# Patient Record
Sex: FEMALE | Race: WHITE | NOT HISPANIC OR LATINO | Employment: FULL TIME
[De-identification: names, ages, dates, MRNs, and addresses within clinical notes are randomized per-mention and may not be internally consistent; named-entity substitution may affect disease eponyms.]

---

## 2017-08-05 ENCOUNTER — HEALTH MAINTENANCE LETTER (OUTPATIENT)
Age: 47
End: 2017-08-05

## 2018-07-01 ENCOUNTER — HEALTH MAINTENANCE LETTER (OUTPATIENT)
Age: 48
End: 2018-07-01

## 2018-08-12 ENCOUNTER — HEALTH MAINTENANCE LETTER (OUTPATIENT)
Age: 48
End: 2018-08-12

## 2018-08-16 ENCOUNTER — HOSPITAL ENCOUNTER (OUTPATIENT)
Dept: MAMMOGRAPHY | Facility: CLINIC | Age: 48
Discharge: HOME | End: 2018-08-16
Attending: FAMILY MEDICINE | Admitting: FAMILY MEDICINE
Payer: COMMERCIAL

## 2018-08-16 DIAGNOSIS — Z12.31 VISIT FOR SCREENING MAMMOGRAM: ICD-10-CM

## 2018-08-16 PROCEDURE — 77063 BREAST TOMOSYNTHESIS BI: CPT

## 2018-10-04 ENCOUNTER — MYC MEDICAL ADVICE (OUTPATIENT)
Dept: FAMILY MEDICINE | Facility: CLINIC | Age: 48
End: 2018-10-04

## 2018-10-04 DIAGNOSIS — Z13.6 CARDIOVASCULAR SCREENING; LDL GOAL LESS THAN 160: Primary | ICD-10-CM

## 2018-10-08 ENCOUNTER — OFFICE VISIT (OUTPATIENT)
Dept: FAMILY MEDICINE | Facility: CLINIC | Age: 48
End: 2018-10-08
Payer: COMMERCIAL

## 2018-10-08 VITALS
BODY MASS INDEX: 32.5 KG/M2 | OXYGEN SATURATION: 99 % | DIASTOLIC BLOOD PRESSURE: 68 MMHG | HEIGHT: 70 IN | HEART RATE: 64 BPM | TEMPERATURE: 98 F | SYSTOLIC BLOOD PRESSURE: 118 MMHG | WEIGHT: 227 LBS

## 2018-10-08 DIAGNOSIS — F32.0 MAJOR DEPRESSIVE DISORDER, SINGLE EPISODE, MILD (H): ICD-10-CM

## 2018-10-08 DIAGNOSIS — N63.13 MASS OF LOWER OUTER QUADRANT OF RIGHT BREAST: ICD-10-CM

## 2018-10-08 DIAGNOSIS — F43.21 ADJUSTMENT DISORDER WITH DEPRESSED MOOD: ICD-10-CM

## 2018-10-08 DIAGNOSIS — Z12.11 SCREEN FOR COLON CANCER: ICD-10-CM

## 2018-10-08 DIAGNOSIS — F32.81 PREMENSTRUAL DYSPHORIA: ICD-10-CM

## 2018-10-08 DIAGNOSIS — D25.9 UTERINE LEIOMYOMA, UNSPECIFIED LOCATION: ICD-10-CM

## 2018-10-08 DIAGNOSIS — Z12.4 SCREENING FOR MALIGNANT NEOPLASM OF CERVIX: ICD-10-CM

## 2018-10-08 DIAGNOSIS — E66.01 MORBID OBESITY DUE TO EXCESS CALORIES (H): ICD-10-CM

## 2018-10-08 DIAGNOSIS — Z83.3 FAMILY HISTORY OF DIABETES MELLITUS: ICD-10-CM

## 2018-10-08 DIAGNOSIS — F41.1 GAD (GENERALIZED ANXIETY DISORDER): ICD-10-CM

## 2018-10-08 DIAGNOSIS — Z00.01 ENCOUNTER FOR ROUTINE ADULT MEDICAL EXAM WITH ABNORMAL FINDINGS: Primary | ICD-10-CM

## 2018-10-08 DIAGNOSIS — Z23 NEED FOR PROPHYLACTIC VACCINATION AND INOCULATION AGAINST INFLUENZA: ICD-10-CM

## 2018-10-08 DIAGNOSIS — Z13.6 CARDIOVASCULAR SCREENING; LDL GOAL LESS THAN 160: ICD-10-CM

## 2018-10-08 DIAGNOSIS — Z13.1 SCREENING FOR DIABETES MELLITUS: ICD-10-CM

## 2018-10-08 DIAGNOSIS — R92.30 DENSE BREAST TISSUE: ICD-10-CM

## 2018-10-08 DIAGNOSIS — Z11.4 SCREENING FOR HIV (HUMAN IMMUNODEFICIENCY VIRUS): ICD-10-CM

## 2018-10-08 LAB
BASOPHILS # BLD AUTO: 0 10E9/L (ref 0–0.2)
BASOPHILS NFR BLD AUTO: 0.3 %
DIFFERENTIAL METHOD BLD: NORMAL
EOSINOPHIL # BLD AUTO: 0.2 10E9/L (ref 0–0.7)
EOSINOPHIL NFR BLD AUTO: 3.5 %
ERYTHROCYTE [DISTWIDTH] IN BLOOD BY AUTOMATED COUNT: 13.3 % (ref 10–15)
HBA1C MFR BLD: 5.5 % (ref 0–5.6)
HCT VFR BLD AUTO: 42.1 % (ref 35–47)
HGB BLD-MCNC: 14 G/DL (ref 11.7–15.7)
LYMPHOCYTES # BLD AUTO: 2 10E9/L (ref 0.8–5.3)
LYMPHOCYTES NFR BLD AUTO: 32.3 %
MCH RBC QN AUTO: 31.5 PG (ref 26.5–33)
MCHC RBC AUTO-ENTMCNC: 33.3 G/DL (ref 31.5–36.5)
MCV RBC AUTO: 95 FL (ref 78–100)
MONOCYTES # BLD AUTO: 0.5 10E9/L (ref 0–1.3)
MONOCYTES NFR BLD AUTO: 8.8 %
NEUTROPHILS # BLD AUTO: 3.3 10E9/L (ref 1.6–8.3)
NEUTROPHILS NFR BLD AUTO: 55.1 %
PLATELET # BLD AUTO: 211 10E9/L (ref 150–450)
RBC # BLD AUTO: 4.44 10E12/L (ref 3.8–5.2)
WBC # BLD AUTO: 6 10E9/L (ref 4–11)

## 2018-10-08 PROCEDURE — 87624 HPV HI-RISK TYP POOLED RSLT: CPT | Performed by: FAMILY MEDICINE

## 2018-10-08 PROCEDURE — 83036 HEMOGLOBIN GLYCOSYLATED A1C: CPT | Performed by: FAMILY MEDICINE

## 2018-10-08 PROCEDURE — G0145 SCR C/V CYTO,THINLAYER,RESCR: HCPCS | Performed by: FAMILY MEDICINE

## 2018-10-08 PROCEDURE — 80061 LIPID PANEL: CPT | Performed by: FAMILY MEDICINE

## 2018-10-08 PROCEDURE — 84443 ASSAY THYROID STIM HORMONE: CPT | Performed by: FAMILY MEDICINE

## 2018-10-08 PROCEDURE — 87389 HIV-1 AG W/HIV-1&-2 AB AG IA: CPT | Performed by: FAMILY MEDICINE

## 2018-10-08 PROCEDURE — 36415 COLL VENOUS BLD VENIPUNCTURE: CPT | Performed by: FAMILY MEDICINE

## 2018-10-08 PROCEDURE — 80053 COMPREHEN METABOLIC PANEL: CPT | Performed by: FAMILY MEDICINE

## 2018-10-08 PROCEDURE — 99396 PREV VISIT EST AGE 40-64: CPT | Performed by: FAMILY MEDICINE

## 2018-10-08 PROCEDURE — 85025 COMPLETE CBC W/AUTO DIFF WBC: CPT | Performed by: FAMILY MEDICINE

## 2018-10-08 ASSESSMENT — ANXIETY QUESTIONNAIRES
7. FEELING AFRAID AS IF SOMETHING AWFUL MIGHT HAPPEN: NOT AT ALL
2. NOT BEING ABLE TO STOP OR CONTROL WORRYING: SEVERAL DAYS
IF YOU CHECKED OFF ANY PROBLEMS ON THIS QUESTIONNAIRE, HOW DIFFICULT HAVE THESE PROBLEMS MADE IT FOR YOU TO DO YOUR WORK, TAKE CARE OF THINGS AT HOME, OR GET ALONG WITH OTHER PEOPLE: NOT DIFFICULT AT ALL
1. FEELING NERVOUS, ANXIOUS, OR ON EDGE: SEVERAL DAYS
GAD7 TOTAL SCORE: 3
5. BEING SO RESTLESS THAT IT IS HARD TO SIT STILL: NOT AT ALL
6. BECOMING EASILY ANNOYED OR IRRITABLE: NOT AT ALL
3. WORRYING TOO MUCH ABOUT DIFFERENT THINGS: SEVERAL DAYS

## 2018-10-08 ASSESSMENT — PATIENT HEALTH QUESTIONNAIRE - PHQ9: 5. POOR APPETITE OR OVEREATING: NOT AT ALL

## 2018-10-08 NOTE — TELEPHONE ENCOUNTER
STEPHANI Shepard scheduled appt  Closing encounter    Aliza Pelletier, RN  [facility]

## 2018-10-08 NOTE — MR AVS SNAPSHOT
"              After Visit Summary   10/8/2018    Lucinda B Goltz    MRN: [id number]           Patient Information     Date Of Birth          1970        Visit Information        Provider Department      10/8/2018 2:00 PM Roxann Valdovinos MD [facility] [place]        Today's Diagnoses     Encounter for routine adult medical exam with abnormal findings    -  1    CARDIOVASCULAR SCREENING; LDL GOAL LESS THAN 160        Adjustment disorder with depressed mood- mild        Screen for colon cancer        Screening for malignant neoplasm of cervix        Screening for HIV (human immunodeficiency virus)        Need for prophylactic vaccination and inoculation against influenza        Premenstrual dysphoria        Major depressive disorder, single episode, mild (H)        DONALD (generalized anxiety disorder)        Dense breast tissue        Morbid obesity due to excess calories (H)        Family history of diabetes mellitus- father, and multiple family members on paternal side- PGM & PGF         Screening for diabetes mellitus        Uterine leiomyoma, unspecified location        Mass of lower outer quadrant of right breast - right breast mass - 2cm ovoid - mobile at 8:00 approx. 3cm from areola           Care Instructions    Establish an exercise regimen. Activity goal: 45 minutes 5 days a week. New exercise routine: cardiovascular workout on exercise equipment, walking and weightlifting. Diet regimen was discussed and plan is self-directed dieting: reduce calories, reduce portions, reduce processed  carbs, increase fruits/vegetables and avoid sweets and supervised diet program. Avoid artificial sweeteners and \"diet\" drinks and sodas.       Dr. Valdovinos's recommended diet to rev-up metabolism for weight loss:   eat every 2-3 hours.   Lean protein = 2 egg whites, or turkey, chicken, fish, low fat, plain greek yogurt (try Fage 0% brand).. Low-glycemic fruits = strawberries, blueberries, raspberries, " blackberries, nectarines, grapefruit, oranges,  apples.     2 oz. Lean protein + 1/2 cup  low-glycemic fruit --- breakfast and midmorning snack     2 oz. Lean protein + 1/4 cup whole grain rice or potato + 1 cup green veggie - lunch, dinner , and afternoon snack.     Evening snack : 1/2 cup of low glycemic fruit or an apple.      If you can't kill it and grill it, or pick it off a plant and eat it - DON'T EAT IT!     For occasional pasta - try Barilla Plus - has protein in it. - keep to 1/2 cup instead of your 1/4 cup of rice or potato.     If 5-6 small meals/day - too labor intensive, then keep to 3 meals plus 1 snack with 3 oz lean protein per meal with 2- 3 oz protein in your snack.     Preventive Health Recommendations  Female Ages 50 - 64    Yearly exam: See your health care provider every year in order to  o Review health changes.   o Discuss preventive care.    o Review your medicines if your doctor has prescribed any.      Get a Pap test every three years (unless you have an abnormal result and your provider advises testing more often).    If you get Pap tests with HPV test, you only need to test every 5 years, unless you have an abnormal result.     You do not need a Pap test if your uterus was removed (hysterectomy) and you have not had cancer.    You should be tested each year for STDs (sexually transmitted diseases) if you're at risk.     Have a mammogram every 1 to 2 years.    Have a colonoscopy at age 50, or have a yearly FIT test (stool test). These exams screen for colon cancer.      Have a cholesterol test every 5 years, or more often if advised.    Have a diabetes test (fasting glucose) every three years. If you are at risk for diabetes, you should have this test more often.     If you are at risk for osteoporosis (brittle bone disease), think about having a bone density scan (DEXA).    Shots: Get a flu shot each year. Get a tetanus shot every 10 years.    Nutrition:     Eat at least 5 servings of  fruits and vegetables each day.    Eat whole-grain bread, whole-wheat pasta and brown rice instead of white grains and rice.    Get adequate Calcium and Vitamin D.     Lifestyle    Exercise at least 150 minutes a week (30 minutes a day, 5 days a week). This will help you control your weight and prevent disease.    Limit alcohol to one drink per day.    No smoking.     Wear sunscreen to prevent skin cancer.     See your dentist every six months for an exam and cleaning.    See your eye doctor every 1 to 2 years.               Thank you for choosing [facility]  for your Health Care. It was a pleasure seeing you at your visit today. Please contact us with any questions or concerns you may have.                   Roxann Valdovinos MD                                  To reach your [facility] care team after hours call:   [phone]    Our clinic hours are:     Monday- 7:30 am - 7:00 pm                             Tuesday through Friday- 7:30 am - 5:00 pm                                        Saturday- 8:00 am - 12:00 pm                  Phone:  [phone]    Our pharmacy hours are:     Monday  8:00 am to 7:00 pm      Tuesday through Friday 8:00am to 6:00pm                        Saturday - 9:00 am to 1:00 pm      Sunday : Closed.              Phone:  [phone]      There is also information available at our web site:  [email or web address]    If your provider ordered any lab tests and you do not receive the results within 10 business days, please call the clinic.    If you need a medication refill please contact your pharmacy.  Please allow 2 business days for your refill to be completed.    Our clinic offers telephone visits and e visits.  Please ask one of your team members to explain more.      Use [employer] (secure email communication and access to your chart) to send your primary care provider a message or make an appointment. Ask someone on your Team how to sign up for  Hortencia.                         Follow-ups after your visit        Additional Services     GASTROENTEROLOGY ADULT REF PROCEDURE ONLY Jaci Cruz [phone]; [facility]       Last Lab Result: Creatinine (mg/dL)       Date                     Value                 05/21/2016               0.66             ----------  There is no height or weight on file to calculate BMI.     Needed:  No  Language:  English    Patient will be contacted to schedule procedure.     Please be aware that coverage of these services is subject to the terms and limitations of your health insurance plan.  Call member services at your health plan with any benefit or coverage questions.  Any procedures must be performed at a [facility] facility OR coordinated by your clinic's referral office.    Please bring the following with you to your appointment:    (1) Any X-Rays, CTs or MRIs which have been performed.  Contact the facility where they were done to arrange for  prior to your scheduled appointment.    (2) List of current medications   (3) This referral request   (4) Any documents/labs given to you for this referral                  Follow-up notes from your care team     Return in about 1 year (around 10/8/2019) for Routine Visit, Lab Work, Physical Exam.      Future tests that were ordered for you today     Open Future Orders        Priority Expected Expires Ordered    MA Diagnostic Right w/Rodriguez Routine  10/8/2019 10/8/2018    US Breast Right Complete 4 Quadrants Routine  10/8/2019 10/8/2018    Fecal colorectal cancer screen FIT - Future (S+30) Routine 10/29/2018 11/7/2018 10/8/2018            Who to contact     If you have questions or need follow up information about today's clinic visit or your schedule please contact [facility] directly at [phone].  Normal or non-critical lab and imaging results will be communicated to you by MyChart, letter or phone within 4 business days  "after the clinic has received the results. If you do not hear from us within 7 days, please contact the clinic through [email or web address] or phone. If you have a critical or abnormal lab result, we will notify you by phone as soon as possible.  Submit refill requests through [email or web address] or call your pharmacy and they will forward the refill request to us. Please allow 3 business days for your refill to be completed.          Additional Information About Your Visit        [employer] Information     [email or web address] gives you secure access to your electronic health record. If you see a primary care provider, you can also send messages to your care team and make appointments. If you have questions, please call your primary care clinic.  If you do not have a primary care provider, please call [phone] and they will assist you.        Care EveryWhere ID     This is your Care EveryWhere ID. This could be used by other organizations to access your [place] medical records  [id number]        Your Vitals Were     Pulse Temperature Height Last Period Pulse Oximetry Breastfeeding?    64 98  F (36.7  C) (Tympanic) 5' 9.5\" (1.765 m) 08/20/2018 99% No    BMI (Body Mass Index)                   33.04 kg/m2            Blood Pressure from Last 3 Encounters:   10/08/18 118/68   05/23/16 120/78   11/06/15 132/80    Weight from Last 3 Encounters:   10/08/18 227 lb (103 kg)   05/23/16 285 lb (129.3 kg)   11/06/15 266 lb (120.7 kg)              We Performed the Following     CBC with platelets differential     Comprehensive metabolic panel     GASTROENTEROLOGY ADULT REF PROCEDURE ONLY Jaci Cruz [phone]; [facility]     Hemoglobin A1c     HIV Screening     HPV High Risk Types DNA Cervical     Lipid panel reflex to direct LDL Fasting     Pap imaged thin layer screen with HPV - recommended age 30 - 65 years (select HPV order below)     TSH with free T4 reflex        Primary Care Provider Office Phone # Fax #    Roxann Valdovinos, " [phone]       [address] [facility] [address]        Equal Access to Services     RON TATE : Hadii sherry Turner, kandi lopes, anne mariecamilla ricearcadiopedro morrowolgapedro, marilia stonein hayaamati owengovind malukhanhscott foley. So [facility] [phone].    ATENCIÓN: Si habla español, tiene a izquierdo disposición servicios gratuitos de asistencia lingüística. Llame al [phone].    We comply with applicable federal civil rights laws and [place] laws. We do not discriminate on the basis of race, color, national origin, age, disability, sex, sexual orientation, or gender identity.            Thank you!     Thank you for choosing [facility]  for your care. Our goal is always to provide you with excellent care. Hearing back from our patients is one way we can continue to improve our services. Please take a few minutes to complete the written survey that you may receive in the mail after your visit with us. Thank you!             Your Updated Medication List - Protect others around you: Learn how to safely use, store and throw away your medicines at www.disposemymeds.org.      Notice  As of 10/8/2018  2:40 PM    You have not been prescribed any medications.

## 2018-10-08 NOTE — PROGRESS NOTES
"   SUBJECTIVE:   CC: Lucinda B Goltz is an 48 year old woman who presents for preventive health visit.         Healthy Habits:    Do you get at least three servings of calcium containing foods daily (dairy, green leafy vegetables, etc.)? yes    Amount of exercise or daily activities, outside of work: 0 day(s) per week    Problems taking medications regularly not applicable    Medication side effects: No    Have you had an eye exam in the past two years? yes    Do you see a dentist twice per year? yes    Do you have sleep apnea, excessive snoring or daytime drowsiness?no      Depression Followup:     Status since last visit: Stable - not on any meds.     Using Greens - 57 vitamins in \"green drink\"  - taking that faithfully - niece is selling it through [employer].     See PHQ-9 for current symptoms.  Other associated symptoms: None    Complicating factors:   Significant life event:  No   Current substance abuse:  None  Anxiety or Panic symptoms:  No    PHQ 5/23/2016 10/8/2018   PHQ-9 Total Score 2 2   Q9: Suicide Ideation Not at all Not at all       PHQ-9  English = 2 today   DONALD-7= 3   PHQ-9   Any Language  Suicide Assessment Five-step Evaluation and Treatment (SAFE-T)      Today's PHQ-2 Score:   PHQ-2 ( 1999 Pfizer) 10/8/2018 4/27/2015   Q1: Little interest or pleasure in doing things 0 0   Q2: Feeling down, depressed or hopeless 0 0   PHQ-2 Score 0 0   Q1: Little interest or pleasure in doing things - -   Q2: Feeling down, depressed or hopeless - -   PHQ-2 Score - -       Abuse: Current or Past(Physical, Sexual or Emotional)- No  Do you feel safe in your environment - Yes      Social History   Substance Use Topics     Smoking status: Former Smoker     Packs/day: 0.50     Years: 4.00     Types: Cigarettes     Quit date: 11/15/1991     Smokeless tobacco: Never Used     Alcohol use Yes      Comment: rare     If you drink alcohol do you typically have >3 drinks per day or >7 drinks per week? No                   "   Reviewed orders with patient.  Reviewed health maintenance and updated orders accordingly - Yes  BP Readings from Last 3 Encounters:   10/08/18 118/68   16 120/78   11/06/15 132/80    Wt Readings from Last 3 Encounters:   10/08/18 227 lb (103 kg)   16 285 lb (129.3 kg)   11/06/15 266 lb (120.7 kg)                  Patient Active Problem List   Diagnosis     Morbid obesity due to excess calories (H)     CARDIOVASCULAR SCREENING; LDL GOAL LESS THAN 160     Uterine leiomyoma, unspecified location     Adjustment disorder with depressed mood- mild     Premenstrual dysphoria     Lump of thigh     Localized swelling, mass, or lump of upper extremity     Major depressive disorder, single episode, mild (H)     DDD (degenerative disc disease), lumbar     DONALD (generalized anxiety disorder)     Dense breast tissue     Family history of diabetes mellitus- father, and multiple family members on paternal side- PGM & PGF      Past Surgical History:   Procedure Laterality Date     C  DELIVERY ONLY      , Low Cervical x 2      EXCISE MASS HEAD  3/30/2012    Procedure:EXCISE MASS HEAD; Excision of Scalp Cyst; Surgeon:HAN GONZALEZ; Location:[facility] OR       Social History   Substance Use Topics     Smoking status: Former Smoker     Packs/day: 0.50     Years: 4.00     Types: Cigarettes     Quit date: 11/15/1991     Smokeless tobacco: Never Used     Alcohol use Yes      Comment: rare     Family History   Problem Relation Age of Onset     HEART DISEASE Father      MI 55     Diabetes Father      Type II     HEART DISEASE Maternal Grandmother      CHF     Cerebrovascular Disease Paternal Grandmother      Diabetes Paternal Grandmother      Type II     Cancer - colorectal Other      in her early 40's      Diabetes Paternal Grandfather      Type II     Asthma Sister          No current outpatient prescriptions on file.     Allergies   Allergen Reactions     Erythromycin Nausea and Vomiting     sensitivity       Recent Labs   Lab Test  16   0929  04/27/15   0825  14   0845  13   1229   LDL  67  42  72  60   HDL  58  57  31*  47*   TRIG  73  56  62  83   ALT  29  18   --   27   CR  0.66  0.72  0.67  0.64   GFRESTIMATED  >90  Non  GFR Calc    87  >90  >90   GFRESTBLACK  >90   GFR Calc    >90   GFR Calc    >90  >90   POTASSIUM  4.2  4.0  4.1  3.9   TSH  1.89   --   2.04   --       Mammogram: Patient over age 50, mutual decision to screen reflected in health maintenance.    Ma Screening Digital Bilateral    Result Date: 2015  Narrative: SCREENING MAMMOGRAM, BILATERAL, DIGITAL w/CAD - 2015 1:31 PM. BREAST SYMPTOMS: No current breast complaints. COMPARISON:  14, 09/29/10. BREAST DENSITY: Scattered fibroglandular densities. COMMENTS: No findings of suspicion for malignancy.            History of abnormal Pap smear: NO - age 30- 65 PAP every 3 years recommended  PAP / HPV Latest Ref Rng & Units 2015   PAP - NIL NIL NIL   HPV 16 DNA NEG Negative - -   HPV 18 DNA NEG Negative - -   OTHER HR HPV NEG Negative - -     Reviewed and updated as needed this visit by clinical staff  Tobacco  Allergies  Meds  Problems  Surg Hx         Reviewed and updated as needed this visit by Provider  Problems  Surg Hx        Past Medical History:   Diagnosis Date     Morbid obesity (H)      Scalp mass       Past Surgical History:   Procedure Laterality Date     C  DELIVERY ONLY      , Low Cervical x 2      EXCISE MASS HEAD  3/30/2012    Procedure:EXCISE MASS HEAD; Excision of Scalp Cyst; Surgeon:HAN GONZALEZ; Location:[facility] OR     Obstetric History       T2      L2     SAB0   TAB0   Ectopic0   Multiple0   Live Births0       # Outcome Date GA Lbr Quan/2nd Weight Sex Delivery Anes PTL Lv   2 Term            1 Term                 No results found for: A1C    Last Comprehensive Metabolic Panel:  Sodium  "  Date Value Ref Range Status   05/21/2016 142 133 - 144 mmol/L Final     Potassium   Date Value Ref Range Status   05/21/2016 4.2 3.4 - 5.3 mmol/L Final     Chloride   Date Value Ref Range Status   05/21/2016 111 (H) 94 - 109 mmol/L Final     Carbon Dioxide   Date Value Ref Range Status   05/21/2016 23 20 - 32 mmol/L Final     Anion Gap   Date Value Ref Range Status   05/21/2016 8 3 - 14 mmol/L Final     Glucose   Date Value Ref Range Status   05/21/2016 95 70 - 99 mg/dL Final     Urea Nitrogen   Date Value Ref Range Status   05/21/2016 14 7 - 30 mg/dL Final     Creatinine   Date Value Ref Range Status   05/21/2016 0.66 0.52 - 1.04 mg/dL Final     GFR Estimate   Date Value Ref Range Status   05/21/2016 >90  Non  GFR Calc   >60 mL/min/1.7m2 Final     Calcium   Date Value Ref Range Status   05/21/2016 8.2 (L) 8.5 - 10.1 mg/dL Final     Comment:     Results confirmed by repeat test     Estimated body mass index is 33.04 kg/(m^2) as calculated from the following:    Height as of this encounter: 5' 9.5\" (1.765 m).    Weight as of this encounter: 227 lb (103 kg).     ROS:  CONSTITUTIONAL: NEGATIVE for fever, chills, change in weight  INTEGUMENTARY/SKIN: NEGATIVE for worrisome rashes, moles or lesions  EYES: NEGATIVE for vision changes or irritation  ENT: NEGATIVE for ear, mouth and throat problems  RESP: NEGATIVE for significant cough or SOB  BREAST: NEGATIVE for masses, tenderness or discharge  CV: NEGATIVE for chest pain, palpitations or peripheral edema  GI: NEGATIVE for nausea, abdominal pain, heartburn, or change in bowel habits  : NEGATIVE for unusual urinary or vaginal symptoms. irregular  vaginal bleeding. Last 3 months = spotting 2/3 months.   MUSCULOSKELETAL: NEGATIVE for significant arthralgias or myalgia  NEURO: NEGATIVE for weakness, dizziness or paresthesias  PSYCHIATRIC: NEGATIVE for changes in mood or affect.     OBJECTIVE:   /68  Pulse 64  Temp 98  F (36.7  C) (Tympanic)  Ht 5' " "9.5\" (1.765 m)  Wt 227 lb (103 kg)  LMP 08/20/2018  SpO2 99%  Breastfeeding? No  BMI 33.04 kg/m2  EXAM:  GENERAL: healthy, alert and no distress  EYES: Eyes grossly normal to inspection, PERRL and conjunctivae and sclerae normal  HENT: ear canals and TM's normal, nose and mouth without ulcers or lesions  NECK: no adenopathy, no asymmetry, masses, or scars and thyroid normal to palpation  RESP: lungs clear to auscultation - no rales, rhonchi or wheezes  BREAST: there is a right breast mass - 2cm smooth ovoid - mobile at 8:00 approx. 3cm from areola normal without masses, tenderness or nipple discharge and no palpable axillary masses or adenopathy  CV: regular rate and rhythm, normal S1 S2, no S3 or S4, no murmur, click or rub, no peripheral edema and peripheral pulses strong  ABDOMEN: soft, nontender, no hepatosplenomegaly, no masses and bowel sounds normal   (female): normal female external genitalia, normal urethral meatus, vaginal mucosa pink, moist, well rugated, and normal cervix/adnexa/uterus without masses or discharge  MS: no gross musculoskeletal defects noted, no edema  SKIN: no suspicious lesions or rashes  NEURO: Normal strength and tone, mentation intact and speech normal  PSYCH: mentation appears normal, affect normal/bright.     See [facility] orders.     ASSESSMENT/PLAN:       ICD-10-CM    1. Encounter for routine adult medical exam with abnormal findings Z00.01    2. CARDIOVASCULAR SCREENING; LDL GOAL LESS THAN 160 Z13.6 CBC with platelets differential     Comprehensive metabolic panel     Lipid panel reflex to direct LDL Fasting     TSH with free T4 reflex     CANCELED: CBC with platelets     CANCELED: Comprehensive metabolic panel     CANCELED: Lipid panel reflex to direct LDL Fasting     CANCELED: TSH with free T4 reflex   3. Adjustment disorder with depressed mood- mild F43.21    4. Screen for colon cancer Z12.11 GASTROENTEROLOGY ADULT REF PROCEDURE ONLY Jaci Cruz [phone]; " "[facility]     Fecal colorectal cancer screen FIT - Future (S+30)   5. Screening for malignant neoplasm of cervix Z12.4 Pap imaged thin layer screen with HPV - recommended age 30 - 65 years (select HPV order below)     HPV High Risk Types DNA Cervical   6. Screening for HIV (human immunodeficiency virus) Z11.4 HIV Screening   7. Need for prophylactic vaccination and inoculation against influenza Z23 CANCELED: HC FLU VAC PRESRV FREE QUAD SPLIT VIR 3+YRS IM  [26989]     CANCELED:      ADMIN VACCINE, FIRST [67097]   8. Premenstrual dysphoria F32.81    9. Major depressive disorder, single episode, mild (H) F32.0    10. DONALD (generalized anxiety disorder) F41.1    11. Dense breast tissue R92.2    12. Morbid obesity due to excess calories (H) E66.01 Hemoglobin A1c   13. Family history of diabetes mellitus- father, and multiple family members on paternal side- PGM & PGF  Z83.3 Hemoglobin A1c   14. Screening for diabetes mellitus Z13.1 Hemoglobin A1c   15. Uterine leiomyoma, unspecified location D25.9    16. Mass of lower outer quadrant of right breast - right breast mass - 2cm ovoid - mobile at 8:00 approx. 3cm from areola  N63.13 MA Diagnostic Right w/Rodriguez     US Breast Right Complete 4 Quadrants     Pt refuses flu shot today.   COUNSELING:   Reviewed preventive health counseling, as reflected in patient instructions    BP Readings from Last 1 Encounters:   10/08/18 118/68     Estimated body mass index is 33.04 kg/(m^2) as calculated from the following:    Height as of this encounter: 5' 9.5\" (1.765 m).    Weight as of this encounter: 227 lb (103 kg).    Weight management plan: see next   Establish an exercise regimen. Activity goal: 45 minutes 5 days a week. New exercise routine: cardiovascular workout on exercise equipment, walking and weightlifting. Diet regimen was discussed and plan is self-directed dieting: reduce calories, reduce portions, reduce processed  carbs, increase fruits/vegetables and avoid " "sweets and supervised diet program. Avoid artificial sweeteners and \"diet\" drinks and sodas.        reports that she quit smoking about 26 years ago. Her smoking use included Cigarettes. She has a 2.00 pack-year smoking history. She has never used smokeless tobacco.      Counseling Resources:  ATP IV Guidelines  Pooled Cohorts Equation Calculator  Breast Cancer Risk Calculator  FRAX Risk Assessment  ICSI Preventive Guidelines  Dietary Guidelines for Americans, 2010  USDA's MyPlate  ASA Prophylaxis  Lung CA Screening    Roxann Valdovinso MD  [facility]  "

## 2018-10-08 NOTE — PATIENT INSTRUCTIONS
"Establish an exercise regimen. Activity goal: 45 minutes 5 days a week. New exercise routine: cardiovascular workout on exercise equipment, walking and weightlifting. Diet regimen was discussed and plan is self-directed dieting: reduce calories, reduce portions, reduce processed  carbs, increase fruits/vegetables and avoid sweets and supervised diet program. Avoid artificial sweeteners and \"diet\" drinks and sodas.       Dr. Valdovinos's recommended diet to rev-up metabolism for weight loss:   eat every 2-3 hours.   Lean protein = 2 egg whites, or turkey, chicken, fish, low fat, plain greek yogurt (try Fage 0% brand).. Low-glycemic fruits = strawberries, blueberries, raspberries, blackberries, nectarines, grapefruit, oranges,  apples.     2 oz. Lean protein + 1/2 cup  low-glycemic fruit --- breakfast and midmorning snack     2 oz. Lean protein + 1/4 cup whole grain rice or potato + 1 cup green veggie - lunch, dinner , and afternoon snack.     Evening snack : 1/2 cup of low glycemic fruit or an apple.      If you can't kill it and grill it, or pick it off a plant and eat it - DON'T EAT IT!     For occasional pasta - try Barilla Plus - has protein in it. - keep to 1/2 cup instead of your 1/4 cup of rice or potato.     If 5-6 small meals/day - too labor intensive, then keep to 3 meals plus 1 snack with 3 oz lean protein per meal with 2- 3 oz protein in your snack.     Preventive Health Recommendations  Female Ages 50 - 64    Yearly exam: See your health care provider every year in order to  o Review health changes.   o Discuss preventive care.    o Review your medicines if your doctor has prescribed any.      Get a Pap test every three years (unless you have an abnormal result and your provider advises testing more often).    If you get Pap tests with HPV test, you only need to test every 5 years, unless you have an abnormal result.     You do not need a Pap test if your uterus was removed (hysterectomy) and you have " not had cancer.    You should be tested each year for STDs (sexually transmitted diseases) if you're at risk.     Have a mammogram every 1 to 2 years.    Have a colonoscopy at age 50, or have a yearly FIT test (stool test). These exams screen for colon cancer.      Have a cholesterol test every 5 years, or more often if advised.    Have a diabetes test (fasting glucose) every three years. If you are at risk for diabetes, you should have this test more often.     If you are at risk for osteoporosis (brittle bone disease), think about having a bone density scan (DEXA).    Shots: Get a flu shot each year. Get a tetanus shot every 10 years.    Nutrition:     Eat at least 5 servings of fruits and vegetables each day.    Eat whole-grain bread, whole-wheat pasta and brown rice instead of white grains and rice.    Get adequate Calcium and Vitamin D.     Lifestyle    Exercise at least 150 minutes a week (30 minutes a day, 5 days a week). This will help you control your weight and prevent disease.    Limit alcohol to one drink per day.    No smoking.     Wear sunscreen to prevent skin cancer.     See your dentist every six months for an exam and cleaning.    See your eye doctor every 1 to 2 years.               Thank you for choosing [facility]  for your Health Care. It was a pleasure seeing you at your visit today. Please contact us with any questions or concerns you may have.                   Roxann Valdovinos MD                                  To reach your [facility] care team after hours call:   [phone]    Our clinic hours are:     Monday- 7:30 am - 7:00 pm                             Tuesday through Friday- 7:30 am - 5:00 pm                                        Saturday- 8:00 am - 12:00 pm                  Phone:  [phone]    Our pharmacy hours are:     Monday  8:00 am to 7:00 pm      Tuesday through Friday 8:00am to 6:00pm                        Saturday - 9:00 am  to 1:00 pm      Sunday : Closed.              Phone:  [phone]      There is also information available at our web site:  [email or web address]    If your provider ordered any lab tests and you do not receive the results within 10 business days, please call the clinic.    If you need a medication refill please contact your pharmacy.  Please allow 2 business days for your refill to be completed.    Our clinic offers telephone visits and e visits.  Please ask one of your team members to explain more.      Use [employer] (secure email communication and access to your chart) to send your primary care provider a message or make an appointment. Ask someone on your Team how to sign up for [employer].

## 2018-10-09 LAB
ALBUMIN SERPL-MCNC: 3.8 G/DL (ref 3.4–5)
ALP SERPL-CCNC: 117 U/L (ref 40–150)
ALT SERPL W P-5'-P-CCNC: 23 U/L (ref 0–50)
ANION GAP SERPL CALCULATED.3IONS-SCNC: 9 MMOL/L (ref 3–14)
AST SERPL W P-5'-P-CCNC: 15 U/L (ref 0–45)
BILIRUB SERPL-MCNC: 0.5 MG/DL (ref 0.2–1.3)
BUN SERPL-MCNC: 11 MG/DL (ref 7–30)
CALCIUM SERPL-MCNC: 8.8 MG/DL (ref 8.5–10.1)
CHLORIDE SERPL-SCNC: 107 MMOL/L (ref 94–109)
CHOLEST SERPL-MCNC: 113 MG/DL
CO2 SERPL-SCNC: 25 MMOL/L (ref 20–32)
CREAT SERPL-MCNC: 0.71 MG/DL (ref 0.52–1.04)
GFR SERPL CREATININE-BSD FRML MDRD: 88 ML/MIN/1.7M2
GLUCOSE SERPL-MCNC: 80 MG/DL (ref 70–99)
HDLC SERPL-MCNC: 42 MG/DL
HIV 1+2 AB+HIV1 P24 AG SERPL QL IA: NONREACTIVE
LDLC SERPL CALC-MCNC: 59 MG/DL
NONHDLC SERPL-MCNC: 71 MG/DL
POTASSIUM SERPL-SCNC: 4.2 MMOL/L (ref 3.4–5.3)
PROT SERPL-MCNC: 6.9 G/DL (ref 6.8–8.8)
SODIUM SERPL-SCNC: 141 MMOL/L (ref 133–144)
TRIGL SERPL-MCNC: 58 MG/DL
TSH SERPL DL<=0.005 MIU/L-ACNC: 2.03 MU/L (ref 0.4–4)

## 2018-10-09 ASSESSMENT — ANXIETY QUESTIONNAIRES: GAD7 TOTAL SCORE: 3

## 2018-10-09 ASSESSMENT — PATIENT HEALTH QUESTIONNAIRE - PHQ9: SUM OF ALL RESPONSES TO PHQ QUESTIONS 1-9: 2

## 2018-10-11 LAB
COPATH REPORT: NORMAL
PAP: NORMAL

## 2018-10-15 LAB
FINAL DIAGNOSIS: NORMAL
HPV HR 12 DNA CVX QL NAA+PROBE: NEGATIVE
HPV16 DNA SPEC QL NAA+PROBE: NEGATIVE
HPV18 DNA SPEC QL NAA+PROBE: NEGATIVE
SPECIMEN DESCRIPTION: NORMAL
SPECIMEN SOURCE CVX/VAG CYTO: NORMAL

## 2018-10-16 ENCOUNTER — HOSPITAL ENCOUNTER (OUTPATIENT)
Dept: MAMMOGRAPHY | Facility: CLINIC | Age: 48
Discharge: HOME | End: 2018-10-16
Attending: FAMILY MEDICINE | Admitting: FAMILY MEDICINE
Payer: COMMERCIAL

## 2018-10-16 ENCOUNTER — HOSPITAL ENCOUNTER (OUTPATIENT)
Dept: ULTRASOUND IMAGING | Facility: CLINIC | Age: 48
End: 2018-10-16
Attending: FAMILY MEDICINE
Payer: COMMERCIAL

## 2018-10-16 DIAGNOSIS — N63.13 MASS OF LOWER OUTER QUADRANT OF RIGHT BREAST: ICD-10-CM

## 2018-10-16 PROCEDURE — 76642 ULTRASOUND BREAST LIMITED: CPT | Mod: RT

## 2018-10-16 PROCEDURE — 77065 DX MAMMO INCL CAD UNI: CPT

## 2018-11-02 ENCOUNTER — HOSPITAL ENCOUNTER (OUTPATIENT)
Facility: CLINIC | Age: 48
Discharge: HOME | End: 2018-11-02
Attending: INTERNAL MEDICINE | Admitting: INTERNAL MEDICINE
Payer: COMMERCIAL

## 2018-11-02 VITALS
OXYGEN SATURATION: 93 % | RESPIRATION RATE: 12 BRPM | DIASTOLIC BLOOD PRESSURE: 65 MMHG | SYSTOLIC BLOOD PRESSURE: 93 MMHG

## 2018-11-02 LAB — COLONOSCOPY: NORMAL

## 2018-11-02 PROCEDURE — 25000128 H RX IP 250 OP 636: Performed by: INTERNAL MEDICINE

## 2018-11-02 PROCEDURE — 45378 DIAGNOSTIC COLONOSCOPY: CPT | Performed by: INTERNAL MEDICINE

## 2018-11-02 PROCEDURE — G0121 COLON CA SCRN NOT HI RSK IND: HCPCS | Performed by: INTERNAL MEDICINE

## 2018-11-02 PROCEDURE — G0500 MOD SEDAT ENDO SERVICE >5YRS: HCPCS | Performed by: INTERNAL MEDICINE

## 2018-11-02 RX ORDER — FENTANYL CITRATE 50 UG/ML
INJECTION, SOLUTION INTRAMUSCULAR; INTRAVENOUS PRN
Status: DISCONTINUED | OUTPATIENT
Start: 2018-11-02 | End: 2018-11-02 | Stop reason: HOSPADM

## 2018-11-02 RX ORDER — FLUMAZENIL 0.1 MG/ML
0.2 INJECTION, SOLUTION INTRAVENOUS
Status: DISCONTINUED | OUTPATIENT
Start: 2018-11-02 | End: 2018-11-02 | Stop reason: HOSPADM

## 2018-11-02 RX ORDER — LIDOCAINE 40 MG/G
CREAM TOPICAL
Status: DISCONTINUED | OUTPATIENT
Start: 2018-11-02 | End: 2018-11-02 | Stop reason: HOSPADM

## 2018-11-02 RX ORDER — ONDANSETRON 2 MG/ML
4 INJECTION INTRAMUSCULAR; INTRAVENOUS
Status: DISCONTINUED | OUTPATIENT
Start: 2018-11-02 | End: 2018-11-02 | Stop reason: HOSPADM

## 2018-11-02 RX ORDER — NALOXONE HYDROCHLORIDE 0.4 MG/ML
.1-.4 INJECTION, SOLUTION INTRAMUSCULAR; INTRAVENOUS; SUBCUTANEOUS
Status: DISCONTINUED | OUTPATIENT
Start: 2018-11-02 | End: 2018-11-02 | Stop reason: HOSPADM

## 2018-11-02 RX ORDER — ONDANSETRON 2 MG/ML
4 INJECTION INTRAMUSCULAR; INTRAVENOUS EVERY 6 HOURS PRN
Status: DISCONTINUED | OUTPATIENT
Start: 2018-11-02 | End: 2018-11-02 | Stop reason: HOSPADM

## 2018-11-02 RX ORDER — ONDANSETRON 4 MG/1
4 TABLET, ORALLY DISINTEGRATING ORAL EVERY 6 HOURS PRN
Status: DISCONTINUED | OUTPATIENT
Start: 2018-11-02 | End: 2018-11-02 | Stop reason: HOSPADM

## 2018-11-02 NOTE — LETTER
October 15, 2018      Lucinda B Goltz  [address] [address] [address]      Thank you for choosing [facility]. You are scheduled for the following service.     Date:  11/02/2018 Friday             Procedure:  COLONOSCOPY  Doctor:        Dr. Arnaldo Mckeon   Arrival Time:  10:30 AM *Check in at Emergency/Endoscopy desk*  Procedure Time:  11:00 AM    Location:   [facility]        Endoscopy Department, First Floor (Enter through ER Doors) *        [address] [place], [place] [address]      [phone] or [phone] () to reschedule      MIRALAX -GATORADE  PREP  Colonoscopy is the most accurate test to detect colon polyps and colon cancer; and the only test where polyps can be removed. During this procedure, a doctor examines the lining of your large intestine and rectum through a flexible tube.       Transportation  Arrange for a ride for the day of your procedure with a responsible adult.  A taxi ride is not an option unless you are accompanied by a responsible adult. If you fail to arrange transportation with a responsible adult, your procedure will be cancelled and rescheduled.    Purchase the  following supplies at your local pharmacy:  - 2 (two) bisacodyl tablets: each tablet contains 5 mg.  (Dulcolax  laxative NOT Dulcolax  stool softener)   - 1 (one) 8.3 oz bottle of Polyethylene Glycol (PEG) 3350 Powder   (MiraLAX , Smooth LAX , ClearLAX  or equivalent)  - 64 oz Gatorade    Regular Gatorade, Gatorade G2 , Powerade , Powerade Zero  or Pedialyte  is acceptable. Red colored flavors are not allowed; all other colors (yellow, green, orange, purple and blue) are okay. It is also okay to buy two 2.12 oz packets of powdered Gatorade that can be mixed with water to a total volume of 64 oz of liquid.  - 1 (one) 10 oz bottle of Magnesium Citrate (Red colored flavors are not allowed)  It is also okay for you to use a 0.5 oz package of  powdered magnesium citrate (17 g) mixed with 10 oz of water.    PREPARATION FOR COLONOSCOPY    7 days before:    Discontinue fiber supplements and medications containing iron. This includes Metamucil  and Fibercon ; and multivitamins with iron.  3 days before:    Begin a low-fiber diet. A low-fiber diet helps making the cleanout more effective.     Examples of a low-fiber diet include (but are not limited to): white bread, white rice, pasta, crackers, fish, chicken, eggs, ground beef, creamy peanut butter, cooked/steamed/boiled vegetables, canned fruit, bananas, melons, milk, plain yogurt cheese, salad dressing and other condiments.     The following are not allowed on a low-fiber diet: seeds, nuts, popcorn, bran, whole wheat, corn, quinoa, raw fruits and vegetables, berries and dried fruit, beans and lentils.    For additional details on low-fiber diet, please refer to the table on the last page.  2 days before:    Continue the low-fiber diet.     Drink at least 8 glasses of water throughout the day.     Stop eating solid foods at 11:45 pm.  1 day before:    In the morning: begin a clear liquid diet (liquids you can see through).     Examples of a clear liquid diet include: water, clear broth or bouillon, Gatorade, Pedialyte or Powerade, carbonated and non-carbonated soft drinks (Sprite , 7-Up , ginger ale), strained fruit juices without pulp (apple, white grape, white cranberry), Jell-O  and popsicles.     The following are not allowed on a clear liquid diet: red liquids, alcoholic beverages, dairy products (milk, creamer, and yogurt), protein shakes, creamy broths, juice with pulp and chewing tobacco.    At noon: take 2 (two) bisacodyl tablets     At 4 (and no later than 6pm): start drinking the Miralax-Gatorade preparation (8.3 oz of Miralax mixed with 64 oz of Gatorade in a large pitcher). Drink 1(one) 8 oz glass every 15 minutes thereafter, until the mixture is gone.    COLON CLEANSING TIPS: drink adequate  amounts of fluids before and after your colon cleansing to prevent dehydration. Stay near a toilet because you will have diarrhea. Even if you are sitting on the toilet, continue to drink the cleansing solution every 15 minutes. If you feel nauseous or vomit, rinse your mouth with water, take a 15 to 30-minute-break and then continue drinking the solution. You will be uncomfortable until the stool has flushed from your colon (in about 2 to 4 hours). You may feel chilled.    Day of your procedure  You may take all of your morning medications including blood pressure medications, blood thinners (if you have not been instructed to stop these by our office), methadone, anti-seizure medications with sips of water 3 hours prior to your procedure or earlier. Do not take insulin or vitamins prior to your procedure. Continue the clear liquid diet.   4 hours prior: drink 10 oz of magnesium citrate. It may be easier to drink it with a straw.    STOP consuming all liquids after that.     Do not take anything by mouth during this time.     Allow extra time to travel to your procedure as you may need to stop and use a restroom along the way.  You are ready for the procedure, if you followed all instructions and your stool is no longer formed, but clear or yellow liquid. If you are unsure whether your colon is clean, please call our office at [phone] before you leave for your appointment.  Bring the following to your procedure:  - Insurance Card/Photo ID.   - List of current medications including over-the-counter medications and supplements.   - Your rescue inhaler if you currently use one to control asthma.      Canceling or rescheduling your appointment:   If you must cancel or reschedule your appointment, please call [phone] as soon as possible.      COLONOSCOPY PRE-PROCEDURE CHECKLIST  If you have diabetes, ask your regular doctor for diet and medication restrictions.  If you take an anticoagulant or anti-platelet  medication (such as Coumadin , Lovenox , Pradaxa , Xarelto , Eliquis , etc.), please call your primary doctor for advice on holding this medication.  If you take aspirin you may continue to do so.  If you are or may be pregnant, please discuss the risks and benefits of this procedure with your doctor.          What happens during a colonoscopy?    Plan to spend up to two hours, starting at registration time, at the endoscopy center the day of your procedure. The colonoscopy takes an average of 15 to 30 minutes. Recovery time is about 30 minutes.    Before the exam:    You will change into a gown.    Your medical history and medication list will be reviewed with you, unless that has been done over the phone prior to the procedure.     A nurse will insert an intravenous (IV) line into your hand or arm.    The doctor will meet with you and will give you a consent form to sign.    During the exam:     Medicine will be given through the IV line to help you relax.     Your heart rate and oxygen levels will be monitored. If your blood pressure is low, you may be given fluids through the IV line.     The doctor will insert a flexible hollow tube, called a colonoscope, into your rectum. The scope will be advanced slowly through the large intestine (colon).    You may have a feeling of fullness or pressure.     If an abnormal tissue or a polyp is found, the doctor may remove it through the endoscope for closer examination, or biopsy. Tissue removal is painless    After the exam:           Any tissue samples removed during the exam will be sent to a lab for evaluation. It may take 5-7 working days for you to be notified of the results.     A nurse will provide you with complete discharge instructions before you leave the endoscopy center. Be sure to ask the nurse for specific instructions if you take blood thinners such as Aspirin, Coumadin or Plavix.     The doctor will prepare a full report for you and for the physician who  referred you for the procedure.     Your doctor will talk with you about the initial results of your exam.      Medication given during the exam will prohibit you from driving for the rest of the day.     Following the exam, you may resume your normal diet. Your first meal should be light, no greasy foods. Avoid alcohol until the next day.     You may resume your regular activities the day after the procedure.     LOW-FIBER DIET    Foods RECOMMENDED Foods to AVOID   Breads, Cereal, Rice and Pasta:   White bread, rolls, biscuits, croissant and adv toast.   Waffles, Italian toast and pancakes.   White rice, noodles, pasta, macaroni and peeled cooked potatoes.   Plain crackers and saltines.   Cooked cereals: farina, cream of rice.   Cold cereals: Puffed Rice , Rice Krispies , Corn Flakes  and Special K    Breads, Cereal, Rice and Pasta:   Breads or rolls with nuts, seeds or fruit.   Whole wheat, pumpernickel, rye breads and cornbread.   Potatoes with skin, brown or wild rice, and kasha (buckwheat).     Vegetables:   Tender cooked and canned vegetables without seeds: carrots, asparagus tips, green or wax beans, pumpkin, spinach, lima beans. Vegetables:   Raw or steamed vegetables.   Vegetables with seeds.   Sauerkraut.   Winter squash, peas, broccoli, Brussel sprouts, cabbage, onions, cauliflower, baked beans, peas and corn.   Fruits:   Strained fruit juice.   Canned fruit, except pineapple.   Ripe bananas and melon. Fruits:   Prunes and prune juice.   Raw fruits.   Dried fruits: figs, dates and raisins.   Milk/Dairy:   Milk: plain or flavored.   Yogurt, custard and ice cream.   Cheese and cottage cheese Milk/Dairy:     Meat and other proteins:   ground, well-cooked tender beef, lamb, ham, veal, pork, fish, poultry and organ meats.   Eggs.   Peanut butter without nuts. Meat and other proteins:   Tough, fibrous meats with gristle.   Dry beans, peas and lentils.   Peanut butter with nuts.   Tofu.   Fats, Snack, Sweets,  Condiments and Beverages:   Margarine, butter, oils, mayonnaise, sour cream and salad dressing, plain gravy.   Sugar, hard candy, clear jelly, honey and syrup.   Spices, cooked herbs, bouillon, broth and soups made with allowed vegetable, ketchup and mustard.   Coffee, tea and carbonated drinks.   Plain cakes, cookies and pretzels.   Gelatin, plain puddings, custard, ice cream, sherbet and popsicles. Fats, Snack, Sweets, Condiments and Beverages:   Nuts, seeds and coconut.   Jam, marmalade and preserves.   Pickles, olives, relish and horseradish.   All desserts containing nuts, seeds, dried fruit and coconut; or made from whole grains or bran.   Candy made with nuts or seeds.   Popcorn.                     DIRECTIONS TO THE ENDOSCOPY DEPARTMENT     From the north ([facility])  Take [address] South, exit on [address] [address]. Get into the left hand karina, turn left (east), go one-half mile to [address] and turn left. Go north to the first stoplight, take a right on [address] and follow it to the Emergency entrance.    From the south ([facility])  Take 35N to the [address]E split and exit on [address] [address]. On [address] [address], turn left (west) to [address]. Turn right (north) on [address]. Go north to the first stoplight, take a right on [address] and follow it to the Emergency entrance.    From the east via 35E ([facility])  Take [address] south to [address] [address] exit. Turn right on [address] [address]. Go west to [address]. Turn right (north) on [address]. Go to the first stoplight, take a right and follow on [address] to the Emergency entrance.    From the east via [address] ([facility])  Take [address] to [address]. Turn left (south) on [address] to [address]. Turn left (east) on [address] and follow it to the Emergency entrance.    From the west via [address] ([place], [place])  Take [address] east to [address].  Turn right (south) on [address] to [place]. Turn left (east) on [address] and follow it to the Emergency entrance.

## 2018-11-02 NOTE — DISCHARGE INSTRUCTIONS
The patient has received a copy of the Provation  report the doctor has written and discharge instructions have been discussed with the patient and responsible adult.  All questions were addressed and answered prior to patient discharge.  
b/l knees, Left hip

## 2018-11-02 NOTE — IP AVS SNAPSHOT
MRN:[id number]                      After Visit Summary   11/2/2018    Lucinda B Goltz    MRN: [id number]           Thank you!     Thank you for choosing [facility] for your care. Our goal is always to provide you with excellent care. Hearing back from our patients is one way we can continue to improve our services. Please take a few minutes to complete the written survey that you may receive in the mail after you visit. If you would like to speak to someone directly about your visit please contact Patient Relations at [phone]. Thank you!          Patient Information     Date Of Birth          1970        About your hospital stay     You were admitted on:  November 2, 2018 You last received care in the:  [facility]    You were discharged on:  November 2, 2018       Who to Call     For medical emergencies, please call 911.  For non-urgent questions about your medical care, please call your primary care provider or clinic, [phone]  For questions related to your surgery, please call your surgery clinic        Attending Provider     Provider Specialty    Arnaldo Mckeon MD Gastroenterology       Primary Care Provider Office Phone # Fax #    Roxann JUAN DANIEL Valdovinos [phone]      Further instructions from your care team       The patient has received a copy of the Provation  report the doctor has written and discharge instructions have been discussed with the patient and responsible adult.  All questions were addressed and answered prior to patient discharge.    Pending Results     No orders found from 10/31/2018 to 11/3/2018.            Admission Information     Date & Time Provider Department Dept. Phone    11/2/2018 Arnaldo Mckeon MD [facility] Endoscopy [phone]      Your Vitals Were     Blood Pressure Respirations Pulse Oximetry             97/62 9 92%         MyChart Information     [employer] gives you secure access to  your electronic health record. If you see a primary care provider, you can also send messages to your care team and make appointments. If you have questions, please call your primary care clinic.  If you do not have a primary care provider, please call [phone] and they will assist you.        Care EveryWhere ID     This is your Care EveryWhere ID. This could be used by other organizations to access your [place] medical records  [id number]        Equal Access to Services     RON TATE : Paulo herron Soarmida, wacjda lumarvinadaha, qaybta kaalmada adebud, marilia foley. So [facility] [phone].    ATENCIÓN: Si habla español, tiene a izquierdo disposición servicios gratuitos de asistencia lingüística. Llame al [phone].    We comply with applicable federal civil rights laws and [place] laws. We do not discriminate on the basis of race, color, national origin, age, disability, sex, sexual orientation, or gender identity.               Review of your medicines      Notice     You have not been prescribed any medications.             Protect others around you: Learn how to safely use, store and throw away your medicines at www.disposemymeds.org.             Medication List: This is a list of all your medications and when to take them. Check marks below indicate your daily home schedule. Keep this list as a reference.      Notice     You have not been prescribed any medications.

## 2018-11-02 NOTE — H&P
Pre-Endoscopy History and Physical     Lucinda B Goltz MRN# [id number]   YOB: 1970 Age: 48 year old     Date of Procedure: 2018  Primary care provider: Roxann Valdovinos  Type of Endoscopy: Colonoscopy with possible biopsy, possible polypectomy  Reason for Procedure: screen  Type of Anesthesia Anticipated: Conscious Sedation    HPI:    Tona is a 48 year old female who will be undergoing the above procedure.      A history and physical has been performed. The patient's medications and allergies have been reviewed. The risks and benefits of the procedure and the sedation options and risks were discussed with the patient.  All questions were answered and informed consent was obtained.      She denies a personal or family history of anesthesia complications or bleeding disorders.     Patient Active Problem List   Diagnosis     Morbid obesity due to excess calories (H)     CARDIOVASCULAR SCREENING; LDL GOAL LESS THAN 160     Uterine leiomyoma, unspecified location     Adjustment disorder with depressed mood- mild     Premenstrual dysphoria     Lump of thigh     Localized swelling, mass, or lump of upper extremity     Major depressive disorder, single episode, mild (H)     DDD (degenerative disc disease), lumbar     DONALD (generalized anxiety disorder)     Dense breast tissue     Family history of diabetes mellitus- father, and multiple family members on paternal side- PGM & PGF         Past Medical History:   Diagnosis Date     Morbid obesity (H)      Scalp mass         Past Surgical History:   Procedure Laterality Date     C  DELIVERY ONLY      , Low Cervical x 2      EXCISE MASS HEAD  3/30/2012    Procedure:EXCISE MASS HEAD; Excision of Scalp Cyst; Surgeon:HAN GONZALEZ; Location:[facility] OR       Social History   Substance Use Topics     Smoking status: Former Smoker     Packs/day: 0.50     Years: 4.00     Types: Cigarettes     Quit date: 11/15/1991     Smokeless tobacco:  "Never Used     Alcohol use Yes      Comment: rare       Family History   Problem Relation Age of Onset     HEART DISEASE Father      MI 55     Diabetes Father      Type II     HEART DISEASE Maternal Grandmother      CHF     Cerebrovascular Disease Paternal Grandmother      Diabetes Paternal Grandmother      Type II     Cancer - colorectal Other      in her early 40's      Diabetes Paternal Grandfather      Type II     Asthma Sister        Prior to Admission medications    Not on File       Allergies   Allergen Reactions     Erythromycin Nausea and Vomiting     sensitivity         REVIEW OF SYSTEMS:   5 point ROS negative except as noted above in HPI, including Gen., Resp., CV, GI &  system review.    PHYSICAL EXAM:   There were no vitals taken for this visit. Estimated body mass index is 33.04 kg/(m^2) as calculated from the following:    Height as of 10/8/18: 1.765 m (5' 9.5\").    Weight as of 10/8/18: 103 kg (227 lb).   GENERAL APPEARANCE: alert, and oriented  MENTAL STATUS: alert  AIRWAY EXAM: Mallampatti Class I (visualization of the soft palate, fauces, uvula, anterior and posterior pillars)  RESP: lungs clear to auscultation - no rales, rhonchi or wheezes  CV: regular rates and rhythm  DIAGNOSTICS:    Not indicated    IMPRESSION   ASA Class 2 - Mild systemic disease    PLAN:   Plan for Colonoscopy with possible biopsy, possible polypectomy. We discussed the risks, benefits and alternatives and the patient wished to proceed.    The above has been forwarded to the consulting provider.      Signed Electronically by: Arnaldo Mckeon  November 2, 2018          "

## 2018-12-14 ENCOUNTER — TELEPHONE (OUTPATIENT)
Dept: FAMILY MEDICINE | Facility: CLINIC | Age: 48
End: 2018-12-14

## 2018-12-14 NOTE — TELEPHONE ENCOUNTER
Attempt #1.    Naima Zurita contacted Tona on 12/14/18 and left a message. If patient calls back please schedule appointment as soon as possible (there are Saturday acute appts in clinic or [facility]) and contact RN team.    Balbina Zurita RN  [facility]

## 2018-12-15 NOTE — TELEPHONE ENCOUNTER
Reason for Call:  Other appointment    Detailed comments: PT called this morning and needs to be seen for a poss. Sinus infection. However, the pt does NOT want to see anyone else but Dr. Valdovinos. [facility]? Please give pt a call if we are able to accommodate. Thank you.    Phone Number Patient can be reached at: Home number on file [phone] (home)    Best Time:     Can we leave a detailed message on this number? YES    Call taken on 12/15/2018 at 10:11 AM by Karlee Mantilla

## 2018-12-17 NOTE — TELEPHONE ENCOUNTER
Routing to RV south.    Zaina Edwards, BS, RN, N  [facility] Triage  ) [phone]

## 2018-12-19 ENCOUNTER — OFFICE VISIT (OUTPATIENT)
Dept: FAMILY MEDICINE | Facility: CLINIC | Age: 48
End: 2018-12-19
Payer: COMMERCIAL

## 2018-12-19 VITALS
HEART RATE: 48 BPM | TEMPERATURE: 97 F | HEIGHT: 70 IN | SYSTOLIC BLOOD PRESSURE: 102 MMHG | DIASTOLIC BLOOD PRESSURE: 72 MMHG | WEIGHT: 214.2 LBS | BODY MASS INDEX: 30.67 KG/M2 | OXYGEN SATURATION: 100 %

## 2018-12-19 DIAGNOSIS — F43.21 ADJUSTMENT DISORDER WITH DEPRESSED MOOD: ICD-10-CM

## 2018-12-19 DIAGNOSIS — J01.00 ACUTE NON-RECURRENT MAXILLARY SINUSITIS: Primary | ICD-10-CM

## 2018-12-19 DIAGNOSIS — Z12.11 SCREEN FOR COLON CANCER: ICD-10-CM

## 2018-12-19 PROCEDURE — 99213 OFFICE O/P EST LOW 20 MIN: CPT | Performed by: FAMILY MEDICINE

## 2018-12-19 RX ORDER — FLUTICASONE PROPIONATE 50 MCG
1 SPRAY, SUSPENSION (ML) NASAL DAILY
Qty: 16 G | Refills: 1 | Status: SHIPPED | OUTPATIENT
Start: 2018-12-19 | End: 2019-01-04

## 2018-12-19 ASSESSMENT — ANXIETY QUESTIONNAIRES
3. WORRYING TOO MUCH ABOUT DIFFERENT THINGS: NOT AT ALL
1. FEELING NERVOUS, ANXIOUS, OR ON EDGE: NOT AT ALL
GAD7 TOTAL SCORE: 2
6. BECOMING EASILY ANNOYED OR IRRITABLE: NOT AT ALL
IF YOU CHECKED OFF ANY PROBLEMS ON THIS QUESTIONNAIRE, HOW DIFFICULT HAVE THESE PROBLEMS MADE IT FOR YOU TO DO YOUR WORK, TAKE CARE OF THINGS AT HOME, OR GET ALONG WITH OTHER PEOPLE: NOT DIFFICULT AT ALL
7. FEELING AFRAID AS IF SOMETHING AWFUL MIGHT HAPPEN: NOT AT ALL
5. BEING SO RESTLESS THAT IT IS HARD TO SIT STILL: NOT AT ALL
2. NOT BEING ABLE TO STOP OR CONTROL WORRYING: SEVERAL DAYS

## 2018-12-19 ASSESSMENT — PATIENT HEALTH QUESTIONNAIRE - PHQ9
SUM OF ALL RESPONSES TO PHQ QUESTIONS 1-9: 1
5. POOR APPETITE OR OVEREATING: SEVERAL DAYS

## 2018-12-19 ASSESSMENT — MIFFLIN-ST. JEOR: SCORE: 1671.85

## 2018-12-19 NOTE — PATIENT INSTRUCTIONS
try eat one yogurt ( with active cultures) daily or oral probiotic otc such as Culturelle or Align to help restore your natural gut bacteria to hopefully prevent yeast infections and/or diarrhea sometimes assoc. with this antibiotic.      Sinusitis           What is sinusitis?   Sinusitis is swollen, infected linings of the sinuses. The sinuses are hollow spaces in the bones of your face and skull. They connect with the nose through small openings. Like the nose, their linings make mucus.   How does it occur?   Sinusitis occurs when the sinus linings become infected. The passageways from the sinuses to the nose are very narrow. Swelling and mucus may block the passageways. This leads to pressure changes in the sinuses that can be painful.   A number of things can cause swelling and sinusitis. Most often it's allergens (things that cause allergies, like pollen and mold) and viruses, such as viruses that cause the common cold. Whether the cause is allergies or a virus, the sinus linings can swell. When swelling causes the sinus passageway to swell shut, bacteria, viruses, and even fungus can be trapped in the sinuses and cause a sinus infection.   If your nasal bones have been injured or are deformed, causing partial blockage of the sinus openings, you are more likely to get sinusitis.   What are the symptoms?   Symptoms include:   feeling of fullness or pressure in your head   a headache that is most painful when you first wake up in the morning or when you bend your head down or forward   pain above or below your eyes   aching in the upper jaw and teeth   runny or stuffy nose   cough, especially at night   fluid draining down the back of your throat (postnasal drainage)   sore throat in the morning or evening.   How is it diagnosed?   Your healthcare provider will ask about your symptoms and will examine you. You may have an X-ray to look for swelling, fluid, or small benign growths (polyps) in the  sinuses.   How is it treated?   Decongestants may help. They may be nonprescription or prescription. They are available as liquids, pills, and nose sprays.   Your healthcare provider may prescribe an antibiotic. In some cases you may need to take decongestants and antibiotics for several weeks.   You may need nonprescription medicine for pain, such as acetaminophen or ibuprofen. Check with your healthcare provider before you give any medicine that contains aspirin or salicylates to a child or teen. This includes medicines like baby aspirin, some cold medicines, and Pepto Bismol. Children and teens who take aspirin are at risk for a serious illness called Reye's syndrome. Ibuprofen is an NSAID. Nonsteroidal anti-inflammatory medicines (NSAIDs) may cause stomach bleeding and other problems. These risks increase with age. Read the label and take as directed. Unless recommended by your healthcare provider, do not take NSAIDs for more than 10 days for any reason.   If you have chronic or repeated sinus infections, allergies may be the cause. Your healthcare provider may prescribe antihistamine tablets or prescription nasal sprays (steroids or cromolyn) to treat the allergies.   If you have chronic, severe sinusitis that does not respond to treatment with medicines, surgery may be done. The surgeon can create an extra or enlarged passageway in the wall of the sinus cavity. This allows the sinuses to drain more easily through the nasal passages. This should help them stay free of infection.   How long will the effects last?   Symptoms may get better gradually over 3 to 10 days. Depending on what caused the sinusitis and how severe it is, it may last for days or weeks. The symptoms may come back if you do not finish all of your antibiotic.   How can I take care of myself?   Follow your healthcare provider's instructions.   If you are taking an antibiotic, take all of it as directed by your provider. If you stop taking the  medicine when your symptoms are gone but before you have taken all of the medicine, symptoms may come back.   Avoid tobacco smoke.   If you have allergies, take care to avoid the things you are allergic to, such as animal dander.   Add moisture to the air with a humidifier or a vaporizer, unless you have mold allergy (mold may grow in your vaporizer).   Inhale steam from a basin of hot water or shower to help open your sinuses and relieve pain.   Use saline nasal sprays to help wash out nasal passages and clear some mucus from the airways.   Use decongestants as directed on the label or by your provider.   If you are using a nonprescription nasal-spray decongestant, generally you should not use it for more than 3 days. After 3 days it may cause your symptoms to get worse. Ask your healthcare provider if it is OK for you to use a nasal spray decongestant longer than this.   Get plenty of rest.   Drink more fluids to keep the mucus as thin as possible so your sinuses can drain more easily.   Put warm compresses on painful areas.   Take antibiotics as prescribed. Use all of the medicine, even after you feel better. Some sinus infections require 2 to 4 weeks of antibiotic treatment.   See your healthcare provider if the pain lasts for several days or gets worse.   If the sinus areas above or below your eyes are swollen or bulging, see your healthcare provider right away. This symptom may mean that the infection is spreading. A spreading infection can affect other parts of your body--even the brain--and needs to be treated promptly.   How can I help prevent sinusitis?   Treat your colds and allergies promptly. Use decongestants as soon as you start having symptoms.   Do not smoke and stay away from secondhand smoke.   Drink lots of fluids to keep the mucus thin.   Humidify your home if the air is particularly dry.   If you have sinus infections often, consider having allergy tests.   If sinusitis continues to be a  problem despite treatment, you might need an exam by an ear, nose, and throat doctor (called an ENT or otolaryngologist). The specialist will check for polyps or a deformed bone that may be blocking your sinuses.     Published by [employer].  This content is reviewed periodically and is subject to change as new health information becomes available. The information is intended to inform and educate and is not a replacement for medical evaluation, advice, diagnosis or treatment by a healthcare professional.   Developed by [employer].   ? 2010 [facility] and/or its affiliates. All Rights Reserved.   Copyright   [employer] 2011  Adult Health Advisor                 Thank you for choosing [facility]  for your Health Care. It was a pleasure seeing you at your visit today. Please contact us with any questions or concerns you may have.                   Roxann Valdovinos MD                                  To reach your [facility] care team after hours call:   [phone]    Our clinic hours are:     Monday- 7:30 am - 7:00 pm                             Tuesday through Friday- 7:30 am - 5:00 pm                                        Saturday- 8:00 am - 12:00 pm                  Phone:  [phone]    Our pharmacy hours are:     Monday  8:00 am to 7:00 pm      Tuesday through Friday 8:00am to 6:00pm                        Saturday - 9:00 am to 1:00 pm      Sunday : Closed.              Phone:  [phone]      There is also information available at our web site:  [email or web address]    If your provider ordered any lab tests and you do not receive the results within 10 business days, please call the clinic.    If you need a medication refill please contact your pharmacy.  Please allow 2 business days for your refill to be completed.    Our clinic offers telephone visits and e visits.  Please ask one of your team members to explain more.      Use [employer] (secure  email communication and access to your chart) to send your primary care provider a message or make an appointment. Ask someone on your Team how to sign up for [employer].

## 2018-12-19 NOTE — LETTER
My Depression Action Plan  Name: Lucinda B Goltz   Date of Birth 1970  Date: 12/19/2018    My doctor: Roxann Valdoivnos   My clinic: [address] [id number] [phone]          GREEN    ZONE   Good Control    What it looks like:     Things are going generally well. You have normal up s and down s. You may even feel depressed from time to time, but bad moods usually last less than a day.   What you need to do:  1. Continue to care for yourself (see self care plan)  2. Check your depression survival kit and update it as needed  3. Follow your physician s recommendations including any medication.  4. Do not stop taking medication unless you consult with your physician first.           YELLOW         ZONE Getting Worse    What it looks like:     Depression is starting to interfere with your life.     It may be hard to get out of bed; you may be starting to isolate yourself from others.    Symptoms of depression are starting to last most all day and this has happened for several days.     You may have suicidal thoughts but they are not constant.   What you need to do:     1. Call your care team, your response to treatment will improve if you keep your care team informed of your progress. Yellow periods are signs an adjustment may need to be made.     2. Continue your self-care, even if you have to fake it!    3. Talk to someone in your support network    4. Open up your depression survival kit           RED    ZONE Medical Alert - Get Help    What it looks like:     Depression is seriously interfering with your life.     You may experience these or other symptoms: You can t get out of bed most days, can t work or engage in other necessary activities, you have trouble taking care of basic hygiene, or basic responsibilities, thoughts of suicide or death that will not go away, self-injurious behavior.     What you need to do:  1. Call your  care team and request a same-day appointment. If they are not available (weekends or after hours) call your local crisis line, emergency room or 911.            Depression Self Care Plan / Survival Kit    Self-Care for Depression  Here s the deal. Your body and mind are really not as separate as most people think.  What you do and think affects how you feel and how you feel influences what you do and think. This means if you do things that people who feel good do, it will help you feel better.  Sometimes this is all it takes.  There is also a place for medication and therapy depending on how severe your depression is, so be sure to consult with your medical provider and/ or Behavioral Health Consultant if your symptoms are worsening or not improving.     In order to better manage my stress, I will:    Exercise  Get some form of exercise, every day. This will help reduce pain and release endorphins, the  feel good  chemicals in your brain. This is almost as good as taking antidepressants!  This is not the same as joining a gym and then never going! (they count on that by the way ) It can be as simple as just going for a walk or doing some gardening, anything that will get you moving.      Hygiene   Maintain good hygiene (Get out of bed in the morning, Make your bed, Brush your teeth, Take a shower, and Get dressed like you were going to work, even if you are unemployed).  If your clothes don't fit try to get ones that do.    Diet  I will strive to eat foods that are good for me, drink plenty of water, and avoid excessive sugar, caffeine, alcohol, and other mood-altering substances.  Some foods that are helpful in depression are: complex carbohydrates, B vitamins, flaxseed, fish or fish oil, fresh fruits and vegetables.    Psychotherapy  I agree to participate in Individual Therapy (if recommended).    Medication  If prescribed medications, I agree to take them.  Missing doses can result in serious side effects.  I  understand that drinking alcohol, or other illicit drug use, may cause potential side effects.  I will not stop my medication abruptly without first discussing it with my provider.    Staying Connected With Others  I will stay in touch with my friends, family members, and my primary care provider/team.    Use your imagination  Be creative.  We all have a creative side; it doesn t matter if it s oil painting, sand castles, or mud pies! This will also kick up the endorphins.    Witness Beauty  (AKA stop and smell the roses) Take a look outside, even in mid-winter. Notice colors, textures. Watch the squirrels and birds.     Service to others  Be of service to others.  There is always someone else in need.  By helping others we can  get out of ourselves  and remember the really important things.  This also provides opportunities for practicing all the other parts of the program.    Humor  Laugh and be silly!  Adjust your TV habits for less news and crime-drama and more comedy.    Control your stress  Try breathing deep, massage therapy, biofeedback, and meditation. Find time to relax each day.     My support system    Clinic Contact:  Phone number:    Contact 1:  Phone number:    Contact 2:  Phone number:    Protestant/:  Phone number:    Therapist:  Phone number:    Local crisis center:    Phone number:    Other community support:  Phone number:

## 2018-12-19 NOTE — PROGRESS NOTES
SUBJECTIVE:                                                    Lucinda B Goltz is a 48 year old female who presents to clinic today for the following health issues:    Acute Illness   Acute illness concerns: sinus  Onset: x1 month    Fever: no    Chills/Sweats: YES- sweats    Headache (location?): no    Sinus Pressure:YES- facial pain- bilateral maxillary and frontal sinuses    Conjunctivitis:  Does get ocular migraines, hasn't had one in a week, in the 4 weeks she has had 6.     Ear Pain: YES- feeling pressure when blowing nose    Rhinorrhea: YES- mostly clear    Congestion: YES- head    Sore Throat: no, but feeling dry     Cough: no, but SOB sometimes    Wheeze: no    Decreased Appetite: no    Nausea: no    Vomiting: no    Diarrhea:  no    Dysuria/Freq.: no    Fatigue/Achiness: YES- fatigue, not sleeping well    Sick/Strep Exposure: YES-  and son both had a cough, but feeling better now.     Wt Readings from Last 5 Encounters:   12/19/18 97.2 kg (214 lb 3.2 oz)   10/08/18 103 kg (227 lb)   05/23/16 129.3 kg (285 lb)   11/06/15 120.7 kg (266 lb)   06/29/15 112.1 kg (247 lb 1 oz)     Doing weight watchers to help lose weight.   Going to [place] on Friday - seeing sister-in-law who has been battling aggressive breast cancer - has BRCA 1/2.      Therapies Tried and outcome: nasal spray - made her feel more congested      Problem list and histories reviewed & adjusted, as indicated.  Additional history: as documented    Reviewed and updated as needed this visit by clinical staff  Tobacco  Allergies  Meds  Med Hx  Surg Hx  Fam Hx  Soc Hx      Reviewed and updated as needed this visit by Provider        Patient Active Problem List   Diagnosis     Morbid obesity due to excess calories (H)     CARDIOVASCULAR SCREENING; LDL GOAL LESS THAN 160     Uterine leiomyoma, unspecified location     Adjustment disorder with depressed mood- mild     Premenstrual dysphoria     Lump of thigh     Localized swelling, mass,  "or lump of upper extremity     Major depressive disorder, single episode, mild (H)     DDD (degenerative disc disease), lumbar     DONALD (generalized anxiety disorder)     Dense breast tissue     Family history of diabetes mellitus- father, and multiple family members on paternal side- PGM & PGF        Current Outpatient Medications   Medication Sig Dispense Refill     Oxymetazoline HCl (SINUS NASAL SPRAY NA) Spray in nostril as needed            Allergies   Allergen Reactions     Erythromycin Nausea and Vomiting     sensitivity          ROS:   ROS: 12 point ROS neg other than the symptoms noted above.     OBJECTIVE:                                                    /72 (BP Location: Left arm, Patient Position: Chair, Cuff Size: Adult Large)   Pulse (!) 48   Temp 97  F (36.1  C) (Tympanic)   Ht 1.778 m (5' 10\")   Wt 97.2 kg (214 lb 3.2 oz)   SpO2 100%   BMI 30.73 kg/m    Body mass index is 30.73 kg/m .   GENERAL: healthy, alert, well nourished, well hydrated, no distress  HENT: ear canals- normal; TMs- normal; Nose- congested; with bilateral maxillary sinus tenderness to palpation; Mouth- no ulcers, no lesions  NECK: no tenderness, no adenopathy, no asymmetry, no masses, no stiffness; thyroid- normal to palpation  RESP: lungs clear to auscultation - no rales, no rhonchi, no wheezes, even with forced exhalation and coughing.   CV: regular rates and rhythm, normal S1 S2, no S3 or S4 and no murmur, no click or rub -  ABDOMEN: soft, no tenderness, no  hepatosplenomegaly, no masses, normal bowel sounds  MS: extremities- no gross deformities noted, no edema    Diagnostic test results:  Diagnostic Test Results:  none      ASSESSMENT/PLAN:                                                        ICD-10-CM    1. Acute non-recurrent maxillary sinusitis J01.00 amoxicillin-clavulanate (AUGMENTIN) 875-125 MG tablet     fluticasone (FLONASE) 50 MCG/ACT nasal spray     DISCONTINUED: fluticasone (VERAMYST) 27.5 MCG/SPRAY " nasal spray   2. Screen for colon cancer Z12.11 Fecal colorectal cancer screen FIT - Future (S+30)   3. Adjustment disorder with depressed mood- mild F43.21 DEPRESSION ACTION PLAN (DAP)       See Patient Instructions. Please, call or return to clinic or go to the ER immediately if signs or symptoms worsen or fail to improve as anticipated.   Demonstrated proper nasal spray technique to patient.     PHQ-9 SCORE 5/23/2016 10/8/2018 12/19/2018   PHQ-9 Total Score - - -   PHQ-9 Total Score 2 2 1            Roxann Valdovinos MD    [facility]

## 2018-12-20 ASSESSMENT — ANXIETY QUESTIONNAIRES: GAD7 TOTAL SCORE: 2

## 2019-01-04 ENCOUNTER — OFFICE VISIT (OUTPATIENT)
Dept: URGENT CARE | Facility: URGENT CARE | Age: 49
End: 2019-01-04
Payer: COMMERCIAL

## 2019-01-04 ENCOUNTER — TELEPHONE (OUTPATIENT)
Dept: URGENT CARE | Facility: URGENT CARE | Age: 49
End: 2019-01-04

## 2019-01-04 ENCOUNTER — NURSE TRIAGE (OUTPATIENT)
Dept: NURSING | Facility: CLINIC | Age: 49
End: 2019-01-04

## 2019-01-04 VITALS
TEMPERATURE: 97.3 F | HEART RATE: 63 BPM | WEIGHT: 220.13 LBS | DIASTOLIC BLOOD PRESSURE: 73 MMHG | BODY MASS INDEX: 31.58 KG/M2 | SYSTOLIC BLOOD PRESSURE: 105 MMHG

## 2019-01-04 DIAGNOSIS — N12 PYELONEPHRITIS: ICD-10-CM

## 2019-01-04 DIAGNOSIS — R82.90 NONSPECIFIC FINDING ON EXAMINATION OF URINE: ICD-10-CM

## 2019-01-04 DIAGNOSIS — R30.0 DYSURIA: Primary | ICD-10-CM

## 2019-01-04 DIAGNOSIS — M54.6 ACUTE RIGHT-SIDED THORACIC BACK PAIN: ICD-10-CM

## 2019-01-04 LAB
ALBUMIN UR-MCNC: >=300 MG/DL
APPEARANCE UR: ABNORMAL
BACTERIA #/AREA URNS HPF: ABNORMAL /HPF
BILIRUB UR QL STRIP: ABNORMAL
COLOR UR AUTO: ABNORMAL
GLUCOSE UR STRIP-MCNC: 100 MG/DL
HGB UR QL STRIP: ABNORMAL
KETONES UR STRIP-MCNC: NEGATIVE MG/DL
LEUKOCYTE ESTERASE UR QL STRIP: ABNORMAL
NITRATE UR QL: POSITIVE
PH UR STRIP: 7 PH (ref 5–7)
RBC #/AREA URNS AUTO: >100 /HPF
SOURCE: ABNORMAL
SP GR UR STRIP: 1.02 (ref 1–1.03)
UROBILINOGEN UR STRIP-ACNC: 1 EU/DL (ref 0.2–1)
WBC #/AREA URNS AUTO: ABNORMAL /HPF

## 2019-01-04 PROCEDURE — 87186 SC STD MICRODIL/AGAR DIL: CPT | Performed by: PHYSICIAN ASSISTANT

## 2019-01-04 PROCEDURE — 81001 URINALYSIS AUTO W/SCOPE: CPT | Performed by: PHYSICIAN ASSISTANT

## 2019-01-04 PROCEDURE — 87088 URINE BACTERIA CULTURE: CPT | Performed by: PHYSICIAN ASSISTANT

## 2019-01-04 PROCEDURE — 87086 URINE CULTURE/COLONY COUNT: CPT | Performed by: PHYSICIAN ASSISTANT

## 2019-01-04 PROCEDURE — 99214 OFFICE O/P EST MOD 30 MIN: CPT | Performed by: PHYSICIAN ASSISTANT

## 2019-01-04 RX ORDER — CIPROFLOXACIN 500 MG/1
500 TABLET, FILM COATED ORAL 2 TIMES DAILY
Qty: 14 TABLET | Refills: 0 | Status: SHIPPED | OUTPATIENT
Start: 2019-01-04 | End: 2019-04-17

## 2019-01-04 RX ORDER — PHENAZOPYRIDINE HYDROCHLORIDE 200 MG/1
200 TABLET, FILM COATED ORAL 3 TIMES DAILY PRN
Qty: 9 TABLET | Refills: 0 | Status: SHIPPED | OUTPATIENT
Start: 2019-01-04 | End: 2019-04-17

## 2019-01-04 NOTE — PROGRESS NOTES
SUBJECTIVE:   Lucinda B Goltz is a 48 year old female who  presents today for a possible UTI. Symptoms of dysuria, urgency and frequency have been going on for 3day(s).  Hematuria yes .  sudden onsetand moderate.  There is no history of fever, chills, nausea or vomiting.   This patient does  have a history of urinary tract infections. Patient denies long duration, rigors, flank pain, temperature > 101 degrees F. and Vomiting, significant nausea or diarrhea or vaginal discharge     Past Medical History:   Diagnosis Date     Morbid obesity (H)      Scalp mass      Allergies   Allergen Reactions     Erythromycin Nausea and Vomiting     sensitivity      Social History     Tobacco Use     Smoking status: Former Smoker     Packs/day: 0.50     Years: 4.00     Pack years: 2.00     Types: Cigarettes     Last attempt to quit: 11/15/1991     Years since quittin.1     Smokeless tobacco: Never Used   Substance Use Topics     Alcohol use: Yes     Comment: rare       ROS:   CONSTITUTIONAL:NEGATIVE for fever, chills, change in weight  INTEGUMENTARY/SKIN: NEGATIVE for worrisome rashes, moles or lesions  GI: NEGATIVE for nausea, abdominal pain, heartburn, or change in bowel habits  : dysuria and frequency of urination  MUSCULOSKELETAL: Positive for right side pain, aches  NEURO: NEGATIVE for weakness, dizziness or paresthesias    OBJECTIVE:  /73   Pulse 63   Temp 97.3  F (36.3  C) (Oral)   Wt 99.8 kg (220 lb 2 oz)   BMI 31.58 kg/m    GENERAL APPEARANCE: healthy, alert and no distress  RESP: lungs clear to auscultation - no rales, rhonchi or wheezes  CV: regular rates and rhythm, normal S1 S2, no murmur noted  ABDOMEN: soft, normal bowel sounds, tenderness right side   BACK: positive for right cva tendenress  EXT: full ROM of lower extremities  SKIN: no suspicious lesions or rashes    Results for orders placed or performed in visit on 19   UA with Microscopic reflex to Culture   Result Value Ref Range    Color  Urine Pink     Appearance Urine Slightly Cloudy     Glucose Urine 100 (A) NEG^Negative mg/dL    Bilirubin Urine Small (A) NEG^Negative    Ketones Urine Negative NEG^Negative mg/dL    Specific Gravity Urine 1.020 1.003 - 1.035    pH Urine 7.0 5.0 - 7.0 pH    Protein Albumin Urine >=300 (A) NEG^Negative mg/dL    Urobilinogen Urine 1.0 0.2 - 1.0 EU/dL    Nitrite Urine Positive (A) NEG^Negative    Blood Urine Large (A) NEG^Negative    Leukocyte Esterase Urine Small (A) NEG^Negative    Source Midstream Urine     WBC Urine 10-25 (A) OTO5^0 - 5 /HPF    RBC Urine >100 (A) OTO2^O - 2 /HPF    Bacteria Urine Few (A) NEG^Negative /HPF       ASSESSMENT/PLAN      ICD-10-CM    1. Dysuria R30.0 UA with Microscopic reflex to Culture     ciprofloxacin (CIPRO) 500 MG tablet     phenazopyridine (PYRIDIUM) 200 MG tablet   2. Nonspecific finding on examination of urine R82.90 Urine Culture Aerobic Bacterial     phenazopyridine (PYRIDIUM) 200 MG tablet   3. Acute right-sided thoracic back pain M54.6 ciprofloxacin (CIPRO) 500 MG tablet   4. Pyelonephritis N12 ciprofloxacin (CIPRO) 500 MG tablet       Orders Placed This Encounter     UA with Microscopic reflex to Culture     ciprofloxacin (CIPRO) 500 MG tablet     phenazopyridine (PYRIDIUM) 200 MG tablet       Urine culture pending  Drink plenty of fluids.  Prevention and treatment of UTI's discussed.Signs and symptoms of pyelonephritis mentioned.  Follow up with primary care physician if not improving

## 2019-01-05 NOTE — TELEPHONE ENCOUNTER
Patient is calling concerned about the Ciprofloxacin she was prescribed for her UTI and the right calf pain she is having that started less than 1 hour ago with the concern for tendonitis warning on her medication that is also listed on Micromedex. She denied redness, streaking, fever, any recent injury to the leg, or recent surgeries or bedrest. She is able to ambulate fine but states that there is definite pain to the calf. Denies hx of blood clots or clotting disorders. She did have a recent trip to [place] that ended 12/30/18 where they had a car ride that she said was long from one side of the state to the other but unsure of how long she was in the car for at the time.    Protocol and care advice reviewed.  Advised per protocol that she should be seen within 4 hrs, but due to Urgent Care visit today she requested transfer to speak with  staff regarding her concerns to see if she really needed to be seen, warm transferred patient to the Urgent Care who will get a different provider to speak with patient, as her provider she saw is no longer in the clinic.  Caller states understanding of the recommended disposition.   Advised to call back if further questions or concerns.    Reason for Disposition    Recent long-distance travel with prolonged time in car, bus, plane, or train (i.e., within past 2 weeks; 6 or  more hours duration)    Additional Information    Negative: Looks like a broken bone or dislocated joint (e.g., crooked or deformed)    Negative: Sounds like a life-threatening emergency to the triager    Negative: Chest pain    Negative: Difficulty breathing    Negative: Entire foot is cool or blue in comparison to other side    Negative: Unable to walk    Negative: [1] Red area or streak AND [2] fever    Negative: [1] Swollen joint AND [2] fever    Negative: [1] Cast on leg or ankle AND [2] now increased pain    Negative: Patient sounds very sick or weak to the triager    Negative: [1] SEVERE pain  "(e.g., excruciating, unable to do any normal activities) AND [2] not improved after 2 hours of pain medicine    Negative: [1] Thigh or calf pain AND [2] only 1 side AND [3] present > 1 hour    Negative: [1] Thigh, calf, or ankle swelling AND [2] only 1 side    Negative: [1] Thigh, calf, or ankle swelling AND [2] bilateral AND [3] 1 side is more swollen    Negative: [1] Red area or streak AND [2] large (> 2 in. or 5 cm)    Negative: History of prior \"blood clot\" in leg or lungs (i.e., deep vein thrombosis, pulmonary embolism)    Negative: History of inherited increased risk of blood clots (e.g., Factor 5 Leiden, Anti-thrombin 3, Protein C or Protein S deficiency, Prothrombin mutation)    Negative: Recent illness requiring prolonged bedrest (i.e., immobilization)    Negative: Hip or leg fracture in past 2 months (e.g, or had cast on leg or ankle)    Negative: Major surgery in the past two months    Negative: Cancer treatment in the past two months (or has cancer now)    Protocols used: LEG PAIN-ADULT-      "

## 2019-01-05 NOTE — TELEPHONE ENCOUNTER
Pt was put on Cipro for UTI today. Pt states was told of side effects by pharmacist which one was tendon issues. Pt states took 1 dose aprox 6 hrs ago and has noted now some mild rt calf discomfort with no swelling or redness. Pt was advised per DR Richards to continue with Cipro and monitor leg for increasing pain,swelling and redness and to call UC back if sx occur. Also advised that culture results often take 48 hrs. Pt verbalizes understanding.  Leah SHORT

## 2019-01-07 LAB
BACTERIA SPEC CULT: ABNORMAL
SPECIMEN SOURCE: ABNORMAL

## 2019-01-08 ENCOUNTER — MYC MEDICAL ADVICE (OUTPATIENT)
Dept: FAMILY MEDICINE | Facility: CLINIC | Age: 49
End: 2019-01-08

## 2019-01-08 NOTE — TELEPHONE ENCOUNTER
Forwarded to J.  Please review patient's Mychart message and advise.  Zaina Edwards, RN, BS, PHN

## 2019-04-11 ENCOUNTER — MYC MEDICAL ADVICE (OUTPATIENT)
Dept: FAMILY MEDICINE | Facility: CLINIC | Age: 49
End: 2019-04-11

## 2019-04-11 NOTE — TELEPHONE ENCOUNTER
Called patient regarding my chart message.  Non-detailed message left for patient to return call  MADELEINE Reed, RN  [facility]

## 2019-04-12 NOTE — TELEPHONE ENCOUNTER
"Patient returning call    Patient stated for the past 2 months right around her Right rib cage and when she is lying it is uncomfortable, not sharp. Just feels like there is something there but unable to palpate anything.   Patient stated she has also lost a lot of weight - Stated in the past 1 year she has lost 80lb. (is doing weight watchers).   Patient stated that her feels like \"static cling\" sometimes - like her skin is clinging and pulling inwards. Patient thought could be dehydration.   Patient stated she had a bladder/kidney infection in 01/2019 - thought it could be recurrence of that.     URINARY TRACT SYMPTOMS  Onset: 2 Months    Description:   Painful urination (Dysuria): no   Blood in urine (Hematuria): no   Delay in urine (Hesitency): no   Frequency: YES - stated this is chronic    Intensity: 5-6/10 - vaginal discomfort    Progression of Symptoms:  same and intermittent    Accompanying Signs & Symptoms:  Fever/chills: no   Flank pain YES- intermittently  Nausea and vomiting: no   Any vaginal symptoms: vaginal discharge and Vaginal Discomfort  Abdominal/Pelvic Pain: no     History:   History of frequent UTI's: no   History of kidney stones: no   Sexually Active: YES  Possibility of pregnancy: \"highly doubt it\" - haven't really had period in a while (Since October 2018)    Precipitating factors:   Patient was on abx in 01/2019    Therapies Tried and outcome: Increase fluid intake       DENIES: CP, SOB, Difficulty Breathing, Dizziness/Lightheadedness, Numbness/Tingling, HA, Vision/Hearing Changes, N/V, vaginal odor, abnormal vaginal bleeding, vaginal itching, dyspareunia (pain in labia/pelvis with intercourse)    Patient would only like to see MD RADHA  OV scheduled for 4/17/19    Advised patient that if new or worsening symptoms appear (reviewed new & worsening symptoms) to call the clinic or be seen in the the ER  Patient stated an understanding and agreed with plan.    Aliza Pelletier RN  [place] " Triage

## 2019-04-12 NOTE — TELEPHONE ENCOUNTER
Noted. Will await call back from pt today.  Balbina Zurita RN  [facility]

## 2019-04-17 ENCOUNTER — OFFICE VISIT (OUTPATIENT)
Dept: FAMILY MEDICINE | Facility: CLINIC | Age: 49
End: 2019-04-17
Payer: COMMERCIAL

## 2019-04-17 VITALS
SYSTOLIC BLOOD PRESSURE: 104 MMHG | DIASTOLIC BLOOD PRESSURE: 70 MMHG | HEIGHT: 70 IN | WEIGHT: 205.8 LBS | OXYGEN SATURATION: 99 % | HEART RATE: 66 BPM | BODY MASS INDEX: 29.46 KG/M2 | TEMPERATURE: 97.7 F

## 2019-04-17 DIAGNOSIS — N76.0 BACTERIAL VAGINOSIS: ICD-10-CM

## 2019-04-17 DIAGNOSIS — R10.9 RIGHT FLANK PAIN: ICD-10-CM

## 2019-04-17 DIAGNOSIS — R35.0 URINARY FREQUENCY: Primary | ICD-10-CM

## 2019-04-17 DIAGNOSIS — N89.8 VAGINAL DISCHARGE: ICD-10-CM

## 2019-04-17 DIAGNOSIS — R22.31 LOCALIZED SWELLING, MASS AND LUMP, UPPER LIMB, RIGHT: ICD-10-CM

## 2019-04-17 DIAGNOSIS — R22.31 LOCALIZED SWELLING, MASS, OR LUMP OF UPPER EXTREMITY, RIGHT: ICD-10-CM

## 2019-04-17 DIAGNOSIS — B96.89 BACTERIAL VAGINOSIS: ICD-10-CM

## 2019-04-17 DIAGNOSIS — R10.11 RIGHT UPPER QUADRANT PAIN: ICD-10-CM

## 2019-04-17 LAB
ALBUMIN UR-MCNC: NEGATIVE MG/DL
APPEARANCE UR: CLEAR
BACTERIA #/AREA URNS HPF: ABNORMAL /HPF
BASOPHILS # BLD AUTO: 0 10E9/L (ref 0–0.2)
BASOPHILS NFR BLD AUTO: 0.4 %
BILIRUB UR QL STRIP: NEGATIVE
COLOR UR AUTO: YELLOW
DIFFERENTIAL METHOD BLD: NORMAL
EOSINOPHIL # BLD AUTO: 0.2 10E9/L (ref 0–0.7)
EOSINOPHIL NFR BLD AUTO: 3.6 %
ERYTHROCYTE [DISTWIDTH] IN BLOOD BY AUTOMATED COUNT: 13.2 % (ref 10–15)
GLUCOSE UR STRIP-MCNC: NEGATIVE MG/DL
HCT VFR BLD AUTO: 38.8 % (ref 35–47)
HGB BLD-MCNC: 13.1 G/DL (ref 11.7–15.7)
HGB UR QL STRIP: NEGATIVE
KETONES UR STRIP-MCNC: NEGATIVE MG/DL
LEUKOCYTE ESTERASE UR QL STRIP: NEGATIVE
LYMPHOCYTES # BLD AUTO: 2.5 10E9/L (ref 0.8–5.3)
LYMPHOCYTES NFR BLD AUTO: 45.7 %
MCH RBC QN AUTO: 31.9 PG (ref 26.5–33)
MCHC RBC AUTO-ENTMCNC: 33.8 G/DL (ref 31.5–36.5)
MCV RBC AUTO: 94 FL (ref 78–100)
MONOCYTES # BLD AUTO: 0.5 10E9/L (ref 0–1.3)
MONOCYTES NFR BLD AUTO: 9 %
NEUTROPHILS # BLD AUTO: 2.3 10E9/L (ref 1.6–8.3)
NEUTROPHILS NFR BLD AUTO: 41.3 %
NITRATE UR QL: NEGATIVE
NON-SQ EPI CELLS #/AREA URNS LPF: ABNORMAL /LPF
PH UR STRIP: 7.5 PH (ref 5–7)
PLATELET # BLD AUTO: 179 10E9/L (ref 150–450)
RBC # BLD AUTO: 4.11 10E12/L (ref 3.8–5.2)
RBC #/AREA URNS AUTO: ABNORMAL /HPF
SOURCE: ABNORMAL
SP GR UR STRIP: 1.01 (ref 1–1.03)
SPECIMEN SOURCE: NORMAL
UROBILINOGEN UR STRIP-ACNC: 0.2 EU/DL (ref 0.2–1)
WBC # BLD AUTO: 5.6 10E9/L (ref 4–11)
WBC #/AREA URNS AUTO: ABNORMAL /HPF
WET PREP SPEC: NORMAL

## 2019-04-17 PROCEDURE — 36415 COLL VENOUS BLD VENIPUNCTURE: CPT | Performed by: FAMILY MEDICINE

## 2019-04-17 PROCEDURE — 80053 COMPREHEN METABOLIC PANEL: CPT | Performed by: FAMILY MEDICINE

## 2019-04-17 PROCEDURE — 87210 SMEAR WET MOUNT SALINE/INK: CPT | Performed by: FAMILY MEDICINE

## 2019-04-17 PROCEDURE — 87086 URINE CULTURE/COLONY COUNT: CPT | Performed by: FAMILY MEDICINE

## 2019-04-17 PROCEDURE — 85025 COMPLETE CBC W/AUTO DIFF WBC: CPT | Performed by: FAMILY MEDICINE

## 2019-04-17 PROCEDURE — 81001 URINALYSIS AUTO W/SCOPE: CPT | Performed by: FAMILY MEDICINE

## 2019-04-17 PROCEDURE — 99214 OFFICE O/P EST MOD 30 MIN: CPT | Performed by: FAMILY MEDICINE

## 2019-04-17 ASSESSMENT — MIFFLIN-ST. JEOR: SCORE: 1633.75

## 2019-04-17 NOTE — PATIENT INSTRUCTIONS
Please, call or return to clinic or go to the ER immediately if signs or symptoms worsen or fail to improve as anticipated.                Thank you for choosing [facility]  for your Health Care. It was a pleasure seeing you at your visit today. Please contact us with any questions or concerns you may have.                   Roxann Valdovinos MD                                  To reach your [facility] care team after hours call:   [phone]    Our clinic hours are:     Monday- 7:30 am - 7:00 pm                             Tuesday through Friday- 7:30 am - 5:00 pm                                        Saturday- 8:00 am - 12:00 pm                  Phone:  [phone]    Our pharmacy hours are:     Monday  8:00 am to 7:00 pm      Tuesday through Friday 8:00am to 6:00pm                        Saturday - 9:00 am to 1:00 pm      Sunday : Closed.              Phone:  [phone]      There is also information available at our web site:  [email or web address]    If your provider ordered any lab tests and you do not receive the results within 10 business days, please call the clinic.    If you need a medication refill please contact your pharmacy.  Please allow 2 business days for your refill to be completed.    Our clinic offers telephone visits and e visits.  Please ask one of your team members to explain more.      Use [employer] (secure email communication and access to your chart) to send your primary care provider a message or make an appointment. Ask someone on your Team how to sign up for [employer].

## 2019-04-17 NOTE — PROGRESS NOTES
"  SUBJECTIVE:                                                    Lucinda B Goltz is a 48 year old female who presents to clinic today for the following health issues:    Triaged on 04/12/2019, no changes.    Patient stated for the past 2 months right around her Right rib cage and when she is lying it is uncomfortable, not sharp. Just feels like there is something there but unable to palpate anything.   Patient stated she has also lost a lot of weight - Stated in the past 1 year she has lost 80lb. (is doing weight watchers).   Patient stated that her feels like \"static cling\" sometimes - like her skin is clinging and pulling inwards. Patient thought could be dehydration.   Patient stated she had a bladder/kidney infection in 01/2019 - thought it could be recurrence of that. no hx of any trauma at all.   No hx of cough in the last 3-4 months at all.  Was treated for possible kidney infection in 1/2019 with cipro.      URINARY TRACT SYMPTOMS  Onset: 2 Months    Description:   Painful urination (Dysuria): no   Blood in urine (Hematuria): no   Delay in urine (Hesitency): no   Frequency: YES - stated this is chronic    Intensity: 5-6/10 - vaginal discomfort    Progression of Symptoms:  same and intermittent    Accompanying Signs & Symptoms:  Fever/chills: no   Flank pain YES- intermittently  Nausea and vomiting: no   Any vaginal symptoms: vaginal discharge and Vaginal Discomfort  Abdominal/Pelvic Pain: no     History:   History of frequent UTI's: no   History of kidney stones: no   Sexually Active: YES  Possibility of pregnancy: \"highly doubt it\" - haven't really had period in a while (Since October 2018)    Precipitating factors:   Patient was on abx in 01/2019 for possible sinus infection prior to a vacation with her sister-in-law who was immunosuppressed with chemotherapy.       Therapies Tried and outcome: Increase fluid intake         DENIES: CP, SOB, Difficulty Breathing, Dizziness/Lightheadedness, " "Numbness/Tingling, HA, Vision/Hearing Changes, N/V, vaginal odor, abnormal vaginal bleeding, vaginal itching, dyspareunia (pain in labia/pelvis with intercourse)    Patient Active Problem List   Diagnosis     Morbid obesity due to excess calories (H)     CARDIOVASCULAR SCREENING; LDL GOAL LESS THAN 160     Uterine leiomyoma, unspecified location     Adjustment disorder with depressed mood- mild     Premenstrual dysphoria     Lump of thigh     Localized swelling, mass, or lump of upper extremity     Major depressive disorder, single episode, mild (H)     DDD (degenerative disc disease), lumbar     DONALD (generalized anxiety disorder)     Dense breast tissue     Family history of diabetes mellitus- father, and multiple family members on paternal side- PGM & PGF        No current outpatient medications on file.          Allergies   Allergen Reactions     Erythromycin Nausea and Vomiting     sensitivity          Problem list and histories reviewed & adjusted, as indicated.  Additional history: as documented    Reviewed and updated as needed this visit by clinical staff  Tobacco  Allergies  Meds  Med Hx  Surg Hx  Fam Hx  Soc Hx      Reviewed and updated as needed this visit by Provider         ROS:   ROS: 12 point ROS neg other than the symptoms noted above.     OBJECTIVE:                                                    /70 (BP Location: Left arm, Patient Position: Chair, Cuff Size: Adult Large)   Pulse 66   Temp 97.7  F (36.5  C) (Oral)   Ht 1.778 m (5' 10\")   Wt 93.4 kg (205 lb 12.8 oz)   SpO2 99%   BMI 29.53 kg/m    Body mass index is 29.53 kg/m .   GENERAL: healthy, alert, well nourished, well hydrated, no distress  HENT: ear canals- normal; TMs- normal; Nose- normal; Mouth- no ulcers, no lesions  NECK: no tenderness, no adenopathy, no asymmetry, no masses, no stiffness; thyroid- normal to palpation  RESP: lungs clear to auscultation - no rales, no rhonchi, no wheezes  Chest: there is some " tenderness to the very lowest 2 right lower ribs at the right axillary line.   CV: regular rates and rhythm, normal S1 S2, no S3 or S4 and no murmur, no click or rub -  ABDOMEN: soft, some right upper quadrant  tenderness, no  hepatosplenomegaly, no masses, normal bowel sounds , no rebound, no guarding.   MS: extremities- no gross deformities noted, no edema.   Skin: there are 3  subcutaneous lumps noted - 2.5cm x 2 cm right forearm, 1 on right anterior thigh = similar - somewhat firm ? Lymph nodes vs. Lipomas.       Diagnostic test results:  Results for orders placed or performed in visit on 04/17/19 (from the past 24 hour(s))   UA with Microscopic   Result Value Ref Range    Color Urine Yellow     Appearance Urine Clear     Glucose Urine Negative NEG^Negative mg/dL    Bilirubin Urine Negative NEG^Negative    Ketones Urine Negative NEG^Negative mg/dL    Specific Gravity Urine 1.010 1.003 - 1.035    pH Urine 7.5 (H) 5.0 - 7.0 pH    Protein Albumin Urine Negative NEG^Negative mg/dL    Urobilinogen Urine 0.2 0.2 - 1.0 EU/dL    Nitrite Urine Negative NEG^Negative    Blood Urine Negative NEG^Negative    Leukocyte Esterase Urine Negative NEG^Negative    Source Midstream Urine     WBC Urine 0 - 5 OTO5^0 - 5 /HPF    RBC Urine O - 2 OTO2^O - 2 /HPF    Squamous Epithelial /LPF Urine Few FEW^Few /LPF    Bacteria Urine Few (A) NEG^Negative /HPF   Urine Culture Aerobic Bacterial   Result Value Ref Range    Specimen Description Midstream Urine     Culture Micro No growth    Wet prep   Result Value Ref Range    Specimen Description Vagina     Wet Prep No Trichomonas seen     Wet Prep No clue cells seen     Wet Prep No yeast seen     Wet Prep WBC'S seen    CBC with platelets differential   Result Value Ref Range    WBC 5.6 4.0 - 11.0 10e9/L    RBC Count 4.11 3.8 - 5.2 10e12/L    Hemoglobin 13.1 11.7 - 15.7 g/dL    Hematocrit 38.8 35.0 - 47.0 %    MCV 94 78 - 100 fl    MCH 31.9 26.5 - 33.0 pg    MCHC 33.8 31.5 - 36.5 g/dL    RDW  13.2 10.0 - 15.0 %    Platelet Count 179 150 - 450 10e9/L    % Neutrophils 41.3 %    % Lymphocytes 45.7 %    % Monocytes 9.0 %    % Eosinophils 3.6 %    % Basophils 0.4 %    Absolute Neutrophil 2.3 1.6 - 8.3 10e9/L    Absolute Lymphocytes 2.5 0.8 - 5.3 10e9/L    Absolute Monocytes 0.5 0.0 - 1.3 10e9/L    Absolute Eosinophils 0.2 0.0 - 0.7 10e9/L    Absolute Basophils 0.0 0.0 - 0.2 10e9/L    Diff Method Automated Method         ASSESSMENT/PLAN:                                                        ICD-10-CM    1. Urinary frequency R35.0 UA with Microscopic     Urine Culture Aerobic Bacterial     CBC with platelets differential     Comprehensive metabolic panel   2. Localized swelling, mass, or lump of upper extremity, right R22.31 GENERAL SURG ADULT REFERRAL   3. Localized swelling, mass and lump, upper limb, right R22.31 GENERAL SURG ADULT REFERRAL   4. Right upper quadrant pain R10.11 CT Abdomen w Contrast     CBC with platelets differential     Comprehensive metabolic panel   5. Right flank pain R10.9 CT Abdomen w Contrast     CBC with platelets differential     Comprehensive metabolic panel   6. Vaginal discharge N89.8 Wet prep   7. Bacterial vaginosis N76.0 metroNIDAZOLE (METROGEL) 0.75 % vaginal gel    B96.89      Please, call or return to clinic or go to the ER immediately if signs or symptoms worsen or fail to improve as anticipated.  Will treat for possible low grade bacterial vaginosis with metrogel vaginal. Pt informed by result note.   See Patient Instructions.          Roxann Valdovinos MD  [facility]

## 2019-04-18 LAB
ALBUMIN SERPL-MCNC: 4 G/DL (ref 3.4–5)
ALP SERPL-CCNC: 119 U/L (ref 40–150)
ALT SERPL W P-5'-P-CCNC: 37 U/L (ref 0–50)
ANION GAP SERPL CALCULATED.3IONS-SCNC: 4 MMOL/L (ref 3–14)
AST SERPL W P-5'-P-CCNC: 23 U/L (ref 0–45)
BACTERIA SPEC CULT: NO GROWTH
BILIRUB SERPL-MCNC: 0.6 MG/DL (ref 0.2–1.3)
BUN SERPL-MCNC: 11 MG/DL (ref 7–30)
CALCIUM SERPL-MCNC: 9.1 MG/DL (ref 8.5–10.1)
CHLORIDE SERPL-SCNC: 108 MMOL/L (ref 94–109)
CO2 SERPL-SCNC: 29 MMOL/L (ref 20–32)
CREAT SERPL-MCNC: 0.65 MG/DL (ref 0.52–1.04)
GFR SERPL CREATININE-BSD FRML MDRD: >90 ML/MIN/{1.73_M2}
GLUCOSE SERPL-MCNC: 88 MG/DL (ref 70–99)
POTASSIUM SERPL-SCNC: 4.3 MMOL/L (ref 3.4–5.3)
PROT SERPL-MCNC: 7.1 G/DL (ref 6.8–8.8)
SODIUM SERPL-SCNC: 141 MMOL/L (ref 133–144)
SPECIMEN SOURCE: NORMAL

## 2019-04-18 RX ORDER — METRONIDAZOLE 7.5 MG/G
1 GEL VAGINAL AT BEDTIME
Qty: 70 G | Refills: 0 | Status: SHIPPED | OUTPATIENT
Start: 2019-04-18 | End: 2019-04-23

## 2019-04-24 ENCOUNTER — HOSPITAL ENCOUNTER (OUTPATIENT)
Dept: CT IMAGING | Facility: CLINIC | Age: 49
Discharge: HOME | End: 2019-04-24
Attending: FAMILY MEDICINE | Admitting: FAMILY MEDICINE
Payer: COMMERCIAL

## 2019-04-24 ENCOUNTER — HOSPITAL ENCOUNTER (EMERGENCY)
Facility: CLINIC | Age: 49
Discharge: LEFT BEFORE DISCHARGE | End: 2019-04-24
Admitting: EMERGENCY MEDICINE
Payer: COMMERCIAL

## 2019-04-24 VITALS
OXYGEN SATURATION: 100 % | TEMPERATURE: 98.3 F | SYSTOLIC BLOOD PRESSURE: 120 MMHG | DIASTOLIC BLOOD PRESSURE: 79 MMHG | RESPIRATION RATE: 18 BRPM

## 2019-04-24 DIAGNOSIS — R10.11 RIGHT UPPER QUADRANT PAIN: ICD-10-CM

## 2019-04-24 DIAGNOSIS — R10.9 RIGHT FLANK PAIN: ICD-10-CM

## 2019-04-24 PROCEDURE — 25000128 H RX IP 250 OP 636: Performed by: FAMILY MEDICINE

## 2019-04-24 PROCEDURE — 25000132 ZZH RX MED GY IP 250 OP 250 PS 637: Performed by: EMERGENCY MEDICINE

## 2019-04-24 PROCEDURE — 40000268 ZZH STATISTIC NO CHARGES

## 2019-04-24 PROCEDURE — 74160 CT ABDOMEN W/CONTRAST: CPT

## 2019-04-24 RX ORDER — IOPAMIDOL 755 MG/ML
500 INJECTION, SOLUTION INTRAVASCULAR ONCE
Status: COMPLETED | OUTPATIENT
Start: 2019-04-24 | End: 2019-04-24

## 2019-04-24 RX ORDER — DIPHENHYDRAMINE HCL 25 MG
50 CAPSULE ORAL ONCE
Status: COMPLETED | OUTPATIENT
Start: 2019-04-24 | End: 2019-04-24

## 2019-04-24 RX ADMIN — DIPHENHYDRAMINE HYDROCHLORIDE 50 MG: 25 CAPSULE ORAL at 18:21

## 2019-04-24 RX ADMIN — IOPAMIDOL 100 ML: 755 INJECTION, SOLUTION INTRAVENOUS at 17:14

## 2019-04-24 RX ADMIN — SODIUM CHLORIDE 65 ML: 9 INJECTION, SOLUTION INTRAVENOUS at 17:14

## 2019-04-24 NOTE — ED TRIAGE NOTES
Pt had outpatient CT abd scheduled. Noticed itching to neck a two hives. No oral swelling, wheezing, or increase WOB. Lungs clear. No other hives noted. No know allergy to contrast dye. ABC intact. A&O x4.

## 2019-04-24 NOTE — ED PROVIDER NOTES
"  History     Chief Complaint:  ***  Hives      HPI   Lucinda B Goltz is a 48 year old female who presents with ***    Allergies:  Erythromycin    Medications:   The patient is currently on no regular medications.     Past Medical History:    Scalp mass  Depression with anxiety  Uterine leiomyoma    Past Surgical History:    C section  Excise mass, head    Family History:    Asthma  DM  Cancer  CHF    Social History:  Former smoker: 0.5 ppd, 4 ppd, quit 1991  Positive for alcohol use.      Review of Systems  ***    Physical Exam     Patient Vitals for the past 24 hrs:   BP Temp Temp src Heart Rate Resp SpO2   04/24/19 1755 120/79 98.3  F (36.8  C) Oral 64 18 100 %         Physical Exam  ***    Emergency Department Course   ECG:  ***    Imaging:  {Radiographic findings?:321666776::\" \"}  ***    Laboratory:  ***    Procedures:  ***        Interventions:  ***  {Response to meds:685287::\" \"}    Emergency Department Course:  ***       Impression & Plan       {trauma activation?:473339::\" \"}  CMS Diagnoses: {Sepsis/Septic Shock/Stemi/Stroke:412211::\" \"}       Medical Decision Making:  ***  Critical Care time:  {none or minutes:777618::\"none\"}    Diagnosis:  No diagnosis found.    Disposition:  {discharged to home/discharged to home with.../Admitted to...:389962}    Discharge Medications:     Medication List      ASK your doctor about these medications    metroNIDAZOLE 0.75 % vaginal gel  Commonly known as:  METROGEL  1 applicator, Vaginal, AT BEDTIME  Ask about: Should I take this medication?              Cuauhtemoc Crane  4/24/2019   [facility] EMERGENCY DEPARTMENT    "

## 2019-04-29 ENCOUNTER — TELEPHONE (OUTPATIENT)
Dept: FAMILY MEDICINE | Facility: CLINIC | Age: 49
End: 2019-04-29

## 2019-04-29 DIAGNOSIS — R10.11 RUQ ABDOMINAL PAIN: Primary | ICD-10-CM

## 2019-04-29 DIAGNOSIS — K63.9 BOWEL WALL THICKENING: ICD-10-CM

## 2019-04-29 NOTE — RESULT ENCOUNTER NOTE
Please call pt with results below:     CT abd/pelvis: IMPRESSION:   1. Moderate bowel wall thickening involving the ascending colon is  partially included on this exam, and suggests a nonspecific colitis,  most likely infectious or inflammatory in etiology. Please clinically  correlate.  2. No other cause for right upper quadrant pain is identified.    Please have pt follow up with [facility] on this asap, especially if she is still having pain. Please place referral to ASAP timing dx: RUQ abdominal pain. With possible colitis.   Bowel wall thickening.     For additional lab test information, [email or web address].org is an excellent reference.

## 2019-04-29 NOTE — TELEPHONE ENCOUNTER
Reason for Call:  Other call back    Detailed comments: Returning nurse call     Phone Number Patient can be reached at: Work number on file:  [phone] (work)    Best Time: any    Can we leave a detailed message on this number? YES    Call taken on 4/29/2019 at 9:41 AM by Sabine Linda

## 2019-05-10 ENCOUNTER — TRANSFERRED RECORDS (OUTPATIENT)
Dept: HEALTH INFORMATION MANAGEMENT | Facility: CLINIC | Age: 49
End: 2019-05-10

## 2019-05-16 ENCOUNTER — TRANSFERRED RECORDS (OUTPATIENT)
Dept: HEALTH INFORMATION MANAGEMENT | Facility: CLINIC | Age: 49
End: 2019-05-16

## 2019-06-12 ENCOUNTER — TRANSFERRED RECORDS (OUTPATIENT)
Dept: HEALTH INFORMATION MANAGEMENT | Facility: CLINIC | Age: 49
End: 2019-06-12

## 2019-06-24 ENCOUNTER — HOSPITAL ENCOUNTER (OUTPATIENT)
Dept: ULTRASOUND IMAGING | Facility: CLINIC | Age: 49
Discharge: HOME | End: 2019-06-24
Attending: PHYSICIAN ASSISTANT | Admitting: PHYSICIAN ASSISTANT
Payer: COMMERCIAL

## 2019-06-24 DIAGNOSIS — R10.11 RUQ PAIN: ICD-10-CM

## 2019-06-24 DIAGNOSIS — K59.01 CONSTIPATION DUE TO SLOW TRANSIT: ICD-10-CM

## 2019-06-24 DIAGNOSIS — Z71.3 DIETARY COUNSELING AND SURVEILLANCE: ICD-10-CM

## 2019-06-24 PROCEDURE — 76705 ECHO EXAM OF ABDOMEN: CPT

## 2019-06-27 ENCOUNTER — MYC MEDICAL ADVICE (OUTPATIENT)
Dept: FAMILY MEDICINE | Facility: CLINIC | Age: 49
End: 2019-06-27

## 2019-06-28 NOTE — TELEPHONE ENCOUNTER
This was ordered by GENIA Cordoba PA-C.   Routing to that provider for review/advisement.       Aliza Pelletier RN  [facility] T[facility]

## 2019-07-01 ENCOUNTER — TRANSFERRED RECORDS (OUTPATIENT)
Dept: HEALTH INFORMATION MANAGEMENT | Facility: CLINIC | Age: 49
End: 2019-07-01

## 2019-07-01 ENCOUNTER — MYC MEDICAL ADVICE (OUTPATIENT)
Dept: FAMILY MEDICINE | Facility: CLINIC | Age: 49
End: 2019-07-01

## 2019-07-09 ENCOUNTER — TRANSFERRED RECORDS (OUTPATIENT)
Dept: HEALTH INFORMATION MANAGEMENT | Facility: CLINIC | Age: 49
End: 2019-07-09

## 2019-09-16 ENCOUNTER — MYC MEDICAL ADVICE (OUTPATIENT)
Dept: FAMILY MEDICINE | Facility: CLINIC | Age: 49
End: 2019-09-16

## 2019-09-16 DIAGNOSIS — R10.9 RIGHT-SIDED ABDOMINAL PAIN OF UNKNOWN CAUSE: Primary | ICD-10-CM

## 2019-09-16 NOTE — TELEPHONE ENCOUNTER
US Abdomen was ordered on 06/24/2019 by IRMA Foote.   Results:   IMPRESSION: Unremarkable right upper quadrant ultrasound. No cause for  abdominal pain is identified.     [employer] Message sent  Awaiting response    Aliza Pelletier RN  [facility]

## 2019-09-17 NOTE — TELEPHONE ENCOUNTER
Please see my chart messages below     Please review and advise     Thank you     Kenna Roque RN, BSN  [facility]

## 2019-09-18 NOTE — TELEPHONE ENCOUNTER
Reviewed CT scan abd/pelvis report from 4/2019 and RUQ US report and [facility] notes from 5/2019 and 7/2019.   CT scan showed some nonspecific colitis in the ascending colon, but subsequent colonoscopy in 5/2019 = normal except for 3 polyps - 1 adenomatous - needs repeat colonoscopy in 5 years.     RUQ US 6/24/2019= unremarkable.     [facility] diagnosed pt with issues with constipation - after miralax = diarrhea, they recommended Benefiber and if that didn't work, trying Magnesium otc 250-500mg 1-2x/day.  Did pt try that?   If that didn't work, they would rx Linzess and have pt f/u with them 6 weeks after that.  ? Did pt try that?     Pt hasn't had a pelvic US.  I ordered this to look for any ovarian etiology.  [facility] will call pt to schedule.  Please inform pt.

## 2019-09-18 NOTE — TELEPHONE ENCOUNTER
Attempt # 1  Called #   Telephone Information:   Mobile [phone]       Left a non detailed VM to call back at [phone] and ask for any available Triage Nurse.    Davonte Bob RN   [facility]

## 2019-09-19 NOTE — TELEPHONE ENCOUNTER
Sent patient a my chart message with providers response.    BIGG KnightN, RN  [facility]

## 2019-09-27 ENCOUNTER — HOSPITAL ENCOUNTER (OUTPATIENT)
Dept: ULTRASOUND IMAGING | Facility: CLINIC | Age: 49
Discharge: HOME | End: 2019-09-27
Attending: FAMILY MEDICINE | Admitting: FAMILY MEDICINE
Payer: COMMERCIAL

## 2019-09-27 DIAGNOSIS — R10.9 RIGHT-SIDED ABDOMINAL PAIN OF UNKNOWN CAUSE: ICD-10-CM

## 2019-09-27 PROCEDURE — 76830 TRANSVAGINAL US NON-OB: CPT

## 2019-11-04 ENCOUNTER — HEALTH MAINTENANCE LETTER (OUTPATIENT)
Age: 49
End: 2019-11-04

## 2019-12-12 ENCOUNTER — MYC MEDICAL ADVICE (OUTPATIENT)
Dept: FAMILY MEDICINE | Facility: CLINIC | Age: 49
End: 2019-12-12

## 2019-12-12 NOTE — TELEPHONE ENCOUNTER
[employer] message sent.      BIGG Goldsmith, RN, PHN  [facility]  Office: [phone]  Fax: [phone]

## 2019-12-17 NOTE — PATIENT INSTRUCTIONS
try eat one yogurt ( with active cultures) daily or oral probiotic otc such as Culturelle, Align. IbSium, or UltraFlora  Intensive Care  to help restore your natural gut bacteria.        Try Beano - 1 tablet with each high fiber meal to help with gassiness and bloating related to high fiber diet.       Preventive Health Recommendations  Female Ages 50 - 64    Yearly exam: See your health care provider every year in order to  o Review health changes.   o Discuss preventive care.    o Review your medicines if your doctor has prescribed any.      Get a Pap test every three years (unless you have an abnormal result and your provider advises testing more often).    If you get Pap tests with HPV test, you only need to test every 5 years, unless you have an abnormal result.     You do not need a Pap test if your uterus was removed (hysterectomy) and you have not had cancer.    You should be tested each year for STDs (sexually transmitted diseases) if you're at risk.     Have a mammogram every 1 to 2 years.    Have a colonoscopy at age 50, or have a yearly FIT test (stool test). These exams screen for colon cancer.      Have a cholesterol test every 5 years, or more often if advised.    Have a diabetes test (fasting glucose) every three years. If you are at risk for diabetes, you should have this test more often.     If you are at risk for osteoporosis (brittle bone disease), think about having a bone density scan (DEXA).    Shots: Get a flu shot each year. Get a tetanus shot every 10 years.    Nutrition:     Eat at least 5 servings of fruits and vegetables each day.    Eat whole-grain bread, whole-wheat pasta and brown rice instead of white grains and rice.    Get adequate Calcium and Vitamin D.     Lifestyle    Exercise at least 150 minutes a week (30 minutes a day, 5 days a week). This will help you control your weight and prevent disease.    Limit alcohol to one drink per day.    No smoking.     Wear sunscreen to  prevent skin cancer.     See your dentist every six months for an exam and cleaning.    See your eye doctor every 1 to 2 years.    Preventive Health Recommendations  Female Ages 50 - 64    Yearly exam: See your health care provider every year in order to  o Review health changes.   o Discuss preventive care.    o Review your medicines if your doctor has prescribed any.      Get a Pap test every three years (unless you have an abnormal result and your provider advises testing more often).    If you get Pap tests with HPV test, you only need to test every 5 years, unless you have an abnormal result.     You do not need a Pap test if your uterus was removed (hysterectomy) and you have not had cancer.    You should be tested each year for STDs (sexually transmitted diseases) if you're at risk.     Have a mammogram every 1 to 2 years.    Have a colonoscopy at age 50, or have a yearly FIT test (stool test). These exams screen for colon cancer.      Have a cholesterol test every 5 years, or more often if advised.    Have a diabetes test (fasting glucose) every three years. If you are at risk for diabetes, you should have this test more often.     If you are at risk for osteoporosis (brittle bone disease), think about having a bone density scan (DEXA).    Shots: Get a flu shot each year. Get a tetanus shot every 10 years.    Nutrition:     Eat at least 5 servings of fruits and vegetables each day.    Eat whole-grain bread, whole-wheat pasta and brown rice instead of white grains and rice.    Get adequate Calcium and Vitamin D.     Lifestyle    Exercise at least 150 minutes a week (30 minutes a day, 5 days a week). This will help you control your weight and prevent disease.    Limit alcohol to one drink per day.    No smoking.     Wear sunscreen to prevent skin cancer.     See your dentist every six months for an exam and cleaning.    See your eye doctor every 1 to 2 years.    Preventive Health Recommendations  Female Ages  50 - 64    Yearly exam: See your health care provider every year in order to  o Review health changes.   o Discuss preventive care.    o Review your medicines if your doctor has prescribed any.      Get a Pap test every three years (unless you have an abnormal result and your provider advises testing more often).    If you get Pap tests with HPV test, you only need to test every 5 years, unless you have an abnormal result.     You do not need a Pap test if your uterus was removed (hysterectomy) and you have not had cancer.    You should be tested each year for STDs (sexually transmitted diseases) if you're at risk.     Have a mammogram every 1 to 2 years.    Have a colonoscopy at age 50, or have a yearly FIT test (stool test). These exams screen for colon cancer.      Have a cholesterol test every 5 years, or more often if advised.    Have a diabetes test (fasting glucose) every three years. If you are at risk for diabetes, you should have this test more often.     If you are at risk for osteoporosis (brittle bone disease), think about having a bone density scan (DEXA).    Shots: Get a flu shot each year. Get a tetanus shot every 10 years.    Nutrition:     Eat at least 5 servings of fruits and vegetables each day.    Eat whole-grain bread, whole-wheat pasta and brown rice instead of white grains and rice.    Get adequate Calcium and Vitamin D.     Lifestyle    Exercise at least 150 minutes a week (30 minutes a day, 5 days a week). This will help you control your weight and prevent disease.    Limit alcohol to one drink per day.    No smoking.     Wear sunscreen to prevent skin cancer.     See your dentist every six months for an exam and cleaning.    See your eye doctor every 1 to 2 years.        Back Exercises: Abdominal Lift  The Abdominal Lift strengthens your lower abdominal muscles, helping you keep your pelvis and back stable.    Lie on the floor with both knees bent. Put your feet flat on the floor and your  arms by your sides. Tighten your abdominal muscles.    Lift one bent knee and move it toward your upper body. Keep your abdominal muscles tight and your back flat on the floor. Hold for 10 seconds.    Repeat 3 times. Then, switch legs.       4914-4831 [employer]. [address] [place] [address]. All rights reserved. This information is not intended as a substitute for professional medical care. Always follow your healthcare professional's instructions.        Back Exercises: Seated Rotation      To start, sit in a chair with your feet flat on the floor. Shift your weight slightly forward to avoid rounding your back. Relax, and keep your ears, shoulders, and hips aligned.    Fold your arms and elbows just below shoulder height.    Turn from the waist with hips forward. Turn your head last. Do not push through the pain.    Hold for a count of 5. Return to starting position.    Repeat 5 times on one side. Then switch sides.    3482-1235 [employer]. [address] [place] [address]. All rights reserved. This information is not intended as a substitute for professional medical care. Always follow your healthcare professional's instructions.        Back Exercises: Pelvic Tilt  To start, lie on your back with your knees bent and feet flat on the floor. Don t press your neck or lower back to the floor. Breathe deeply. You should feel comfortable and relaxed in this position.    Tighten your abdomen and buttocks, and press your lower back toward the floor. This should be a small, subtle movement. This should not increase your pain.    Hold for 5 seconds. Release.    Repeat 5 times.           4761-7321 [employer]. [address] [place] [address]. All rights reserved. This information is not intended as a substitute for professional medical care. Always follow your healthcare professional's instructions.        Back Exercises: Partial Curl-Ups        To  start, lie on your back with your knees bent and feet flat on the floor. Don t press your neck or lower back to the floor. Breathe deeply. You should feel comfortable and relaxed in this position.    Cross your arms loosely.    Tighten your abdomen and curl MCC up, keeping your head in line with your shoulders.    Hold for 5 seconds. Uncurl to lie down.    Repeat 5 times.     5639-6273 [employer]. [address] [place] [address]. All rights reserved. This information is not intended as a substitute for professional medical care. Always follow your healthcare professional's instructions.        Back Exercises: Lower Back Stretch                        To start, sit in a chair with your feet flat on the floor. Shift your weight slightly forward to avoid rounding your back. Relax, and keep your ears, shoulders, and hips aligned.    Sit with your feet well apart.    Bend forward and touch the floor with the backs of your hands. Relax and let your body drop.    Hold for 20 seconds. Return to starting position.    Repeat 2 times.     4715-7916 [employer]. [address] [place] [address]. All rights reserved. This information is not intended as a substitute for professional medical care. Always follow your healthcare professional's instructions.        Back Exercises: Lower Back Rotation  To start, lie on your back with your knees bent and feet flat on the floor. Don t press your neck or lower back to the floor. Breathe deeply. You should feel comfortable and relaxed in this position.    Drop both knees to one side. Turn your head to the other side. Keep your shoulders flat on the floor.    Do not push through pain.    Hold for 20 seconds.    Slowly switch sides.    Repeat 2 times.                             7355-9395 [employer]. [address] [place] [address]. All rights reserved. This information is not intended as a substitute for  professional medical care. Always follow your healthcare professional's instructions.        Back Exercises: Leg Reach        Do this exercise on your hands and knees. Keep your knees under your hips and your hands under your shoulders. Keep your spine in a neutral position (not arched or sagging). Be sure to maintain your neck s natural curve.    Extend one leg straight back. Don t arch your back or let your head or body sag.    Hold for 5 seconds. Return to starting position.    Repeat 5 times.    Switch legs.     5364-6801 [employer]. [address] [place] [address]. All rights reserved. This information is not intended as a substitute for professional medical care. Always follow your healthcare professional's instructions.        Back Exercises: Leg Pull        To start, lie on your back with your knees bent and feet flat on the floor. Don t press your neck or lower back to the floor. Breathe deeply. You should feel comfortable and relaxed in this position.    Pull one knee to your chest.    Hold for 30-60 seconds. Return to starting position.    Repeat 2 times.    Switch legs.    For a double leg pull, pull both legs to your chest at the same time. Repeat 2 times.  For your safety, check with your healthcare provider before starting an exercise program.     0555-4121 [employer]. [address] [place] [address]. All rights reserved. This information is not intended as a substitute for professional medical care. Always follow your healthcare professional's instructions.        Back Exercises: Knee Lift        To start, lie on your back with your knees bent and feet flat on the floor. Don t press your neck or lower back to the floor. Breathe deeply. You should feel comfortable and relaxed in this position.    Lift one bent knee and move it toward your upper body. Keep your abdominal muscles tight and your back flat on the floor.    Hold for 10 seconds. Return to  start position.    Repeat 3 times.    Switch legs.    1894-3625 [employer]. [address] [place] [address]. All rights reserved. This information is not intended as a substitute for professional medical care. Always follow your healthcare professional's instructions.        Back Exercises: Hip Rotator Stretch        To start, lie on your back with your knees bent and feet flat on the floor. Don t press your neck or lower back to the floor. Breathe deeply. You should feel comfortable and relaxed in this position.    Rest your right ankle on your left knee.    Place a towel behind your left thigh and use it to pull the knee toward your chest. Feel the stretch in your buttocks.    Hold for 30-60 seconds. Release.    Repeat 2 times.    Switch legs.   For your safety, check with your healthcare provider before starting an exercise program.     6438-9801 [employer]. [address] [place] [address]. All rights reserved. This information is not intended as a substitute for professional medical care. Always follow your healthcare professional's instructions.        Back Exercises: Hip Lift        To start, lie on your back with your knees bent and feet flat on the floor. Don t press your neck or lower back to the floor. Breathe deeply. You should feel comfortable and relaxed in this position.    Slowly raise your hips upward.    Tighten your abdomen and buttocks. Be careful not to arch your back.    Hold for 5 seconds. Lower your hips to the floor.    Repeat 10 times.  For your safety, check with your healthcare provider before starting an exercise program.     8972-0935 [employer]. [address] [place] [address]. All rights reserved. This information is not intended as a substitute for professional medical care. Always follow your healthcare professional's instructions.        Back Exercises: Back Extension with Elbow Press    To start, lie face  down on your stomach, feet slightly apart, forehead on the floor. Breathe deeply. You should feel comfortable and relaxed in this position.    Press up on your forearms. Keep your abdomen and hips on the floor. Stay within your painfree range.    Hold for 20 seconds. Lower slowly.    Repeat 2 times.    Return to starting position.    1863-9639 [employer]. [address] [place] [address]. All rights reserved. This information is not intended as a substitute for professional medical care. Always follow your healthcare professional's instructions.        Back Exercises: Bridge  The Bridge exercise strengthens your abdominal, buttocks, and hamstring muscles. This helps keep your back stable and aligned when you walk.    Lie on the floor with your back and palms flat. Bend your knees. Keep your feet flat on the floor.    Contract your abdominal and buttocks muscles. Slowly lift your buttocks off the floor until there is a straight line from your knees to your shoulders.    Hold for 5  seconds. Repeat 10 times.      2251-4363 [employer]. [address] [place] [address]. All rights reserved. This information is not intended as a substitute for professional medical care. Always follow your healthcare professional's instructions.        Back Exercises: Back Release  Do this exercise on your hands and knees. Keep your knees under your hips and your hands under your shoulders. Keep your spine in a neutral position (not arched or sagging). Be sure to maintain your neck s natural curve.    Relax your abdominal and buttocks muscles, lift your head, and let your back sag. Be sure to keep your weight evenly distributed. Don t sit back on your hips.     Hold for 5 seconds.    Return to starting position.    Repeat 5 times.    0249-9538 [employer]. [address] [place] [address]. All rights reserved. This information is not intended as a substitute for  professional medical care. Always follow your healthcare professional's instructions.    Thank you so much or choosing [facility]  for your Health Care. It was a pleasure seeing you at your visit today! Please contact us with any questions or concerns you may have.                   Roxann Valdovinos MD                              To reach your [facility] care team after hours call:   [phone]    Our clinic hours are:     Monday- 7:30 am - 7:00 pm                             Tuesday through Friday- 7:30 am - 5:00 pm                                        Saturday- 8:00 am - 12:00 pm                  Phone:  [phone]    Our pharmacy hours are:     Monday  8:00 am to 7:00 pm      Tuesday through Friday 8:00am to 6:00pm                        Saturday - 9:00 am to 1:00 pm      Sunday : Closed.              Phone:  [phone]      There is also information available at our web site:  [email or web address]    If your provider ordered any lab tests and you do not receive the results within 10 business days, please call the clinic.    If you need a medication refill please contact your pharmacy.  Please allow 2 business days for your refill to be completed.    Our clinic offers telephone visits and e visits.  Please ask one of your team members to explain more.      Use [employer] (secure email communication and access to your chart) to send your primary care provider a message or make an appointment. Ask someone on your Team how to sign up for [facility] ([employer] [facility])t.

## 2019-12-17 NOTE — PROGRESS NOTES
"   SUBJECTIVE:   CC: Lucinda B Goltz is an 49 year old woman who presents for preventive health visit.     Healthy Habits:    Do you get at least three servings of calcium containing foods daily (dairy, green leafy vegetables, etc.)? { :749770::\"yes\"}    Amount of exercise or daily activities, outside of work: { :490111}    Problems taking medications regularly { :108214::\"No\"}    Medication side effects: { :780754::\"No\"}    Have you had an eye exam in the past two years? { :334125}    Do you see a dentist twice per year? { :077819}    Do you have sleep apnea, excessive snoring or daytime drowsiness?{ :222086}  {Outside tests to abstract? :173341}    {additional problems to add (Optional):527274}    Today's PHQ-2 Score:   PHQ-2 (  [employer]) 10/8/2018 2015   Q1: Little interest or pleasure in doing things 0 0   Q2: Feeling down, depressed or hopeless 0 0   PHQ-2 Score 0 0   Q1: Little interest or pleasure in doing things - -   Q2: Feeling down, depressed or hopeless - -   PHQ-2 Score - -     {PHQ-2 LOOK IN ASSESSMENTS (Optional) :173843}  Abuse: Current or Past(Physical, Sexual or Emotional)- {YES/NO/NA:020489}  Do you feel safe in your environment? {YES/NO/NA:086755}        Social History     Tobacco Use     Smoking status: Former Smoker     Packs/day: 0.50     Years: 4.00     Pack years: 2.00     Types: Cigarettes     Last attempt to quit: 11/15/1991     Years since quittin.1     Smokeless tobacco: Never Used   Substance Use Topics     Alcohol use: Yes     Comment: rare     If you drink alcohol do you typically have >3 drinks per day or >7 drinks per week? {ETOH :342199}                     Reviewed orders with patient.  Reviewed health maintenance and updated orders accordingly - {Yes/No:227545::\"Yes\"}  {Chronicprobdata (Optional):520549}    {Mammo Decision Support (Optional):416104}    Pertinent mammograms are reviewed under the imaging tab.  History of abnormal Pap smear: {PAP HX:214508}  PAP / HPV " "Latest Ref Rng & Units 10/8/2018 4/27/2015 2/28/2012   PAP - NIL NIL NIL   HPV 16 DNA NEG:Negative Negative Negative -   HPV 18 DNA NEG:Negative Negative Negative -   OTHER HR HPV NEG:Negative Negative Negative -     Reviewed and updated as needed this visit by clinical staff         Reviewed and updated as needed this visit by Provider        {HISTORY OPTIONS (Optional):999556}    ROS:  { :488229}    OBJECTIVE:   There were no vitals taken for this visit.  EXAM:  {Exam Choices:800673}    {Diagnostic Test Results (Optional):218918::\"Diagnostic Test Results:\",\"Labs reviewed in Epic\"}    ASSESSMENT/PLAN:   {Diag Picklist:581493}    COUNSELING:   {FEMALE COUNSELING MESSAGES:504216::\"Reviewed preventive health counseling, as reflected in patient instructions\"}    Estimated body mass index is 29.53 kg/m  as calculated from the following:    Height as of 4/17/19: 1.778 m (5' 10\").    Weight as of 4/17/19: 93.4 kg (205 lb 12.8 oz).    {Weight Management Plan (ACO) Complete if BMI is abnormal-  Ages 18-64  BMI >24.9.  Age 65+ with BMI <23 or >30 (Optional):466813}     reports that she quit smoking about 28 years ago. Her smoking use included cigarettes. She has a 2.00 pack-year smoking history. She has never used smokeless tobacco.  {Tobacco Cessation -- Complete if patient is a smoker (Optional):723679}    Counseling Resources:  ATP IV Guidelines  Pooled Cohorts Equation Calculator  Breast Cancer Risk Calculator  FRAX Risk Assessment  ICSI Preventive Guidelines  Dietary Guidelines for Americans, 2010  [employer]'s MyPlate  ASA Prophylaxis  Lung CA Screening    Roxann Valdovinos MD  [facility] [place]  "

## 2019-12-18 ENCOUNTER — OFFICE VISIT (OUTPATIENT)
Dept: FAMILY MEDICINE | Facility: CLINIC | Age: 49
End: 2019-12-18
Payer: COMMERCIAL

## 2019-12-18 ENCOUNTER — HOSPITAL ENCOUNTER (OUTPATIENT)
Dept: MAMMOGRAPHY | Facility: CLINIC | Age: 49
Discharge: HOME | End: 2019-12-18
Attending: FAMILY MEDICINE | Admitting: FAMILY MEDICINE
Payer: COMMERCIAL

## 2019-12-18 VITALS
HEIGHT: 70 IN | HEART RATE: 88 BPM | BODY MASS INDEX: 29.78 KG/M2 | WEIGHT: 208 LBS | TEMPERATURE: 97.8 F | DIASTOLIC BLOOD PRESSURE: 60 MMHG | OXYGEN SATURATION: 99 % | SYSTOLIC BLOOD PRESSURE: 108 MMHG

## 2019-12-18 DIAGNOSIS — Z23 NEED FOR PROPHYLACTIC VACCINATION AND INOCULATION AGAINST INFLUENZA: ICD-10-CM

## 2019-12-18 DIAGNOSIS — Z13.6 CARDIOVASCULAR SCREENING; LDL GOAL LESS THAN 160: ICD-10-CM

## 2019-12-18 DIAGNOSIS — F32.5 MAJOR DEPRESSIVE DISORDER WITH SINGLE EPISODE, IN FULL REMISSION (H): ICD-10-CM

## 2019-12-18 DIAGNOSIS — Z78.0 POSTMENOPAUSAL: ICD-10-CM

## 2019-12-18 DIAGNOSIS — R10.9 RIGHT SIDED ABDOMINAL PAIN: ICD-10-CM

## 2019-12-18 DIAGNOSIS — Z83.3 FAMILY HISTORY OF DIABETES MELLITUS: ICD-10-CM

## 2019-12-18 DIAGNOSIS — Z00.01 ENCOUNTER FOR ROUTINE ADULT MEDICAL EXAM WITH ABNORMAL FINDINGS: Primary | ICD-10-CM

## 2019-12-18 DIAGNOSIS — R73.09 ELEVATED GLUCOSE: ICD-10-CM

## 2019-12-18 DIAGNOSIS — Z12.31 VISIT FOR SCREENING MAMMOGRAM: ICD-10-CM

## 2019-12-18 DIAGNOSIS — Z23 NEED FOR SHINGLES VACCINE: ICD-10-CM

## 2019-12-18 DIAGNOSIS — D12.6 TUBULAR ADENOMA OF COLON: ICD-10-CM

## 2019-12-18 DIAGNOSIS — F41.1 GAD (GENERALIZED ANXIETY DISORDER): ICD-10-CM

## 2019-12-18 DIAGNOSIS — E66.01 MORBID OBESITY DUE TO EXCESS CALORIES (H): ICD-10-CM

## 2019-12-18 LAB
ALBUMIN SERPL-MCNC: 3.8 G/DL (ref 3.4–5)
ALP SERPL-CCNC: 116 U/L (ref 40–150)
ALT SERPL W P-5'-P-CCNC: 23 U/L (ref 0–50)
ANION GAP SERPL CALCULATED.3IONS-SCNC: 4 MMOL/L (ref 3–14)
AST SERPL W P-5'-P-CCNC: 15 U/L (ref 0–45)
BILIRUB SERPL-MCNC: 0.5 MG/DL (ref 0.2–1.3)
BUN SERPL-MCNC: 12 MG/DL (ref 7–30)
CALCIUM SERPL-MCNC: 8.6 MG/DL (ref 8.5–10.1)
CHLORIDE SERPL-SCNC: 108 MMOL/L (ref 94–109)
CHOLEST SERPL-MCNC: 153 MG/DL
CO2 SERPL-SCNC: 28 MMOL/L (ref 20–32)
CREAT SERPL-MCNC: 0.66 MG/DL (ref 0.52–1.04)
ERYTHROCYTE [DISTWIDTH] IN BLOOD BY AUTOMATED COUNT: 12.7 % (ref 10–15)
GFR SERPL CREATININE-BSD FRML MDRD: >90 ML/MIN/{1.73_M2}
GLUCOSE SERPL-MCNC: 92 MG/DL (ref 70–99)
HBA1C MFR BLD: 5.2 % (ref 0–5.6)
HCT VFR BLD AUTO: 41.1 % (ref 35–47)
HDLC SERPL-MCNC: 71 MG/DL
HGB BLD-MCNC: 13.6 G/DL (ref 11.7–15.7)
LDLC SERPL CALC-MCNC: 71 MG/DL
MCH RBC QN AUTO: 31.3 PG (ref 26.5–33)
MCHC RBC AUTO-ENTMCNC: 33.1 G/DL (ref 31.5–36.5)
MCV RBC AUTO: 95 FL (ref 78–100)
NONHDLC SERPL-MCNC: 82 MG/DL
PLATELET # BLD AUTO: 228 10E9/L (ref 150–450)
POTASSIUM SERPL-SCNC: 4 MMOL/L (ref 3.4–5.3)
PROT SERPL-MCNC: 7.1 G/DL (ref 6.8–8.8)
RBC # BLD AUTO: 4.34 10E12/L (ref 3.8–5.2)
SODIUM SERPL-SCNC: 140 MMOL/L (ref 133–144)
TRIGL SERPL-MCNC: 53 MG/DL
TSH SERPL DL<=0.005 MIU/L-ACNC: 2.63 MU/L (ref 0.4–4)
WBC # BLD AUTO: 5.3 10E9/L (ref 4–11)

## 2019-12-18 PROCEDURE — 83036 HEMOGLOBIN GLYCOSYLATED A1C: CPT | Performed by: FAMILY MEDICINE

## 2019-12-18 PROCEDURE — 80061 LIPID PANEL: CPT | Performed by: FAMILY MEDICINE

## 2019-12-18 PROCEDURE — 85027 COMPLETE CBC AUTOMATED: CPT | Performed by: FAMILY MEDICINE

## 2019-12-18 PROCEDURE — 80053 COMPREHEN METABOLIC PANEL: CPT | Performed by: FAMILY MEDICINE

## 2019-12-18 PROCEDURE — 84443 ASSAY THYROID STIM HORMONE: CPT | Performed by: FAMILY MEDICINE

## 2019-12-18 PROCEDURE — 99396 PREV VISIT EST AGE 40-64: CPT | Performed by: FAMILY MEDICINE

## 2019-12-18 PROCEDURE — 36415 COLL VENOUS BLD VENIPUNCTURE: CPT | Performed by: FAMILY MEDICINE

## 2019-12-18 PROCEDURE — 99214 OFFICE O/P EST MOD 30 MIN: CPT | Mod: 25 | Performed by: FAMILY MEDICINE

## 2019-12-18 PROCEDURE — 77063 BREAST TOMOSYNTHESIS BI: CPT

## 2019-12-18 ASSESSMENT — ANXIETY QUESTIONNAIRES
5. BEING SO RESTLESS THAT IT IS HARD TO SIT STILL: NOT AT ALL
IF YOU CHECKED OFF ANY PROBLEMS ON THIS QUESTIONNAIRE, HOW DIFFICULT HAVE THESE PROBLEMS MADE IT FOR YOU TO DO YOUR WORK, TAKE CARE OF THINGS AT HOME, OR GET ALONG WITH OTHER PEOPLE: NOT DIFFICULT AT ALL
GAD7 TOTAL SCORE: 0
2. NOT BEING ABLE TO STOP OR CONTROL WORRYING: NOT AT ALL
6. BECOMING EASILY ANNOYED OR IRRITABLE: NOT AT ALL
1. FEELING NERVOUS, ANXIOUS, OR ON EDGE: NOT AT ALL
3. WORRYING TOO MUCH ABOUT DIFFERENT THINGS: NOT AT ALL
7. FEELING AFRAID AS IF SOMETHING AWFUL MIGHT HAPPEN: NOT AT ALL

## 2019-12-18 ASSESSMENT — PATIENT HEALTH QUESTIONNAIRE - PHQ9
5. POOR APPETITE OR OVEREATING: NOT AT ALL
SUM OF ALL RESPONSES TO PHQ QUESTIONS 1-9: 0

## 2019-12-18 ASSESSMENT — MIFFLIN-ST. JEOR: SCORE: 1634.76

## 2019-12-18 NOTE — PROGRESS NOTES
SUBJECTIVE:   CC: Lucinda B Goltz is an 49 year old woman who presents for preventive health visit.     Healthy Habits:     Getting at least 3 servings of Calcium per day:  Yes    Bi-annual eye exam:  Yes    Dental care twice a year:  Yes    Sleep apnea or symptoms of sleep apnea:  None    Diet:  Regular (no restrictions)    Frequency of exercise:  2-3 days/week    Duration of exercise:  30-45 minutes    Taking medications regularly:  Yes    Medication side effects:  Not applicable    PHQ-2 Total Score: 0    Additional concerns today:  No      Saw [facility] recently this summer for right sided abdominal pain. Reviewed that report in detail. Had a colonoscopy that showed 4 polyps - 2 were tubular adenomas - repeat colonoscopy in 5 years. Didn't try magnesium supplement as recommended.  Didn't try the Linzess either.  Thinking about 1 or both.   Hasn't been doing a probiotic or food enzyme supplement.     Low back pain is much better secondary to PT and doing some daily exercises.      Depression Followup    How are you doing with your depression since your last visit? Improved     Are you having other symptoms that might be associated with depression? No    Have you had a significant life event?  No     Are you feeling anxious or having panic attacks?   No    Do you have any concerns with your use of alcohol or other drugs? No     Not taking any meds right now and doesn't desire to.     PHQ-9 score:    PHQ-9 SCORE 2019   PHQ-9 Total Score -   PHQ-9 Total Score 0     DONALD-7 SCORE 10/8/2018 2018 2019   Total Score - - -   Total Score 3 2 0          Social History     Tobacco Use     Smoking status: Former Smoker     Packs/day: 0.50     Years: 4.00     Pack years: 2.00     Types: Cigarettes     Last attempt to quit: 11/15/1991     Years since quittin.1     Smokeless tobacco: Never Used   Substance Use Topics     Alcohol use: Yes     Comment: rare     Drug use: No     Comment: no herbal meds  either      PHQ 10/8/2018 2018 2019   PHQ-9 Total Score 2 1 0   Q9: Thoughts of better off dead/self-harm past 2 weeks Not at all Not at all Not at all     DONALD-7 SCORE 10/8/2018 2018 2019   Total Score - - -   Total Score 3 2 0     Last PHQ-9 2019   1.  Little interest or pleasure in doing things 0   2.  Feeling down, depressed, or hopeless 0   3.  Trouble falling or staying asleep, or sleeping too much 0   4.  Feeling tired or having little energy 0   5.  Poor appetite or overeating 0   6.  Feeling bad about yourself 0   7.  Trouble concentrating 0   8.  Moving slowly or restless 0   Q9: Thoughts of better off dead/self-harm past 2 weeks 0   PHQ-9 Total Score 0   Difficulty at work, home, or with people Not difficult at all       Suicide Assessment Five-step Evaluation and Treatment (SAFE-T)      Today's PHQ-2 Score:   PHQ-2 (  [employer]) 2019   Q1: Little interest or pleasure in doing things 0   Q2: Feeling down, depressed or hopeless 0   PHQ-2 Score 0   Q1: Little interest or pleasure in doing things -   Q2: Feeling down, depressed or hopeless -   PHQ-2 Score -       Abuse: Current or Past(Physical, Sexual or Emotional)- No  Do you feel safe in your environment? Yes        Social History     Tobacco Use     Smoking status: Former Smoker     Packs/day: 0.50     Years: 4.00     Pack years: 2.00     Types: Cigarettes     Last attempt to quit: 11/15/1991     Years since quittin.1     Smokeless tobacco: Never Used   Substance Use Topics     Alcohol use: Yes     Comment: rare     If you drink alcohol do you typically have >3 drinks per day or >7 drinks per week? No    Alcohol Use 2019   Prescreen: >3 drinks/day or >7 drinks/week? -   Prescreen: >3 drinks/day or >7 drinks/week? No       Reviewed orders with patient.  Reviewed health maintenance and updated orders accordingly - Yes  Labs reviewed in EPIC  BP Readings from Last 3 Encounters:   19 108/60   19  104/70   19 105/73    Wt Readings from Last 3 Encounters:   19 94.3 kg (208 lb)   19 93.4 kg (205 lb 12.8 oz)   19 99.8 kg (220 lb 2 oz)                  Patient Active Problem List   Diagnosis     Morbid obesity due to excess calories (H)     CARDIOVASCULAR SCREENING; LDL GOAL LESS THAN 160     Uterine leiomyoma, unspecified location     Adjustment disorder with depressed mood- mild     Premenstrual dysphoria     Lump of thigh     Localized swelling, mass, or lump of upper extremity     Major depressive disorder, single episode, mild (H)     DDD (degenerative disc disease), lumbar     DONALD (generalized anxiety disorder)     Dense breast tissue     Family history of diabetes mellitus- father, and multiple family members on paternal side- PGM & PGF      Tubular adenomas of colon - 2 in 2019 - repeat colonoscopy in  per [facility] recommendation      Past Surgical History:   Procedure Laterality Date     C  DELIVERY ONLY      , Low Cervical x 2      COLONOSCOPY N/A 2018    Procedure: COLONOSCOPY;  Surgeon: Arnaldo Mckeon MD;  Location: [facility]     EXCISE MASS HEAD  3/30/2012    Procedure:EXCISE MASS HEAD; Excision of Scalp Cyst; Surgeon:HAN GONZALEZ; Location:[facility]       Social History     Tobacco Use     Smoking status: Former Smoker     Packs/day: 0.50     Years: 4.00     Pack years: 2.00     Types: Cigarettes     Last attempt to quit: 11/15/1991     Years since quittin.1     Smokeless tobacco: Never Used   Substance Use Topics     Alcohol use: Yes     Comment: rare     Family History   Problem Relation Age of Onset     Heart Disease Father         MI 55     Diabetes Father         Type II     Heart Disease Maternal Grandmother         CHF     Cerebrovascular Disease Paternal Grandmother      Diabetes Paternal Grandmother         Type II     Cancer - colorectal Other         in her early 40's      Diabetes Paternal Grandfather         Type II     Asthma  Sister          Current Outpatient Medications   Medication Sig Dispense Refill     zoster vaccine recombinant adjuvanted (SHINGRIX) injection Inject 0.5 mLs into the muscle once for 1 dose 0.5 mL 1     Allergies   Allergen Reactions     Contrast Dye      Erythromycin Nausea and Vomiting     sensitivity      Recent Labs   Lab Test 19  1617 10/08/18  0949 10/08/18  0948 16  0929 04/27/15  0825   A1C  --  5.5  --   --   --    LDL  --   --  59 67 42   HDL  --   --  42* 58 57   TRIG  --   --  58 73 56   ALT 37  --  23 29 18   CR 0.65  --  0.71 0.66 0.72   GFRESTIMATED >90  --  88 >90  Non  GFR Calc   87   GFRESTBLACK >90  --  >90 >90   GFR Calc   >90   GFR Calc     POTASSIUM 4.3  --  4.2 4.2 4.0   TSH  --   --  2.03 1.89  --         Mammogram Screening: Patient over age 50, mutual decision to screen reflected in health maintenance.    Pertinent mammograms are reviewed under the imaging tab.  History of abnormal Pap smear: NO - age 30- 65 PAP every 3 years recommended  PAP / HPV Latest Ref Rng & Units 10/8/2018 2015 2012   PAP - NIL NIL NIL   HPV 16 DNA NEG:Negative Negative Negative -   HPV 18 DNA NEG:Negative Negative Negative -   OTHER HR HPV NEG:Negative Negative Negative -     Reviewed and updated as needed this visit by clinical staff  Tobacco  Allergies  Meds  Med Hx  Surg Hx  Fam Hx  Soc Hx        Reviewed and updated as needed this visit by Provider        OB History    Para Term  AB Living   2 2 2 0 0 2   SAB TAB Ectopic Multiple Live Births   0 0 0 0 0      # Outcome Date GA Lbr Qaun/2nd Weight Sex Delivery Anes PTL Lv   2 Term            1 Term                Review of Systems  CONSTITUTIONAL: NEGATIVE for fever, chills, change in weight  INTEGUMENTARY/SKIN: NEGATIVE for worrisome rashes, moles or lesions  EYES: NEGATIVE for vision changes or irritation  ENT: NEGATIVE for ear, mouth and throat problems  RESP: NEGATIVE for  "significant cough or SOB  BREAST: NEGATIVE for masses, tenderness or discharge  CV: NEGATIVE for chest pain, palpitations or peripheral edema  GI: NEGATIVE for nausea, abdominal pain, heartburn, or change in bowel habits  : NEGATIVE for unusual urinary or vaginal symptoms. No vaginal bleeding. last menses 10/2018  MUSCULOSKELETAL: NEGATIVE for significant arthralgias or myalgia  NEURO: NEGATIVE for weakness, dizziness or paresthesias  ENDOCRINE: NEGATIVE for temperature intolerance, skin/hair changes  HEME/ALLERGY/IMMUNE: NEGATIVE for bleeding problems  PSYCHIATRIC: NEGATIVE for changes in mood or affect      OBJECTIVE:   /60   Pulse 88   Temp 97.8  F (36.6  C) (Tympanic)   Ht 1.772 m (5' 9.75\")   Wt 94.3 kg (208 lb)   LMP 08/20/2018   SpO2 99%   Breastfeeding No   BMI 30.06 kg/m    Physical Exam   Wt Readings from Last 5 Encounters:   12/18/19 94.3 kg (208 lb)   04/17/19 93.4 kg (205 lb 12.8 oz)   01/04/19 99.8 kg (220 lb 2 oz)   12/19/18 97.2 kg (214 lb 3.2 oz)   10/08/18 103 kg (227 lb)       GENERAL APPEARANCE: healthy, alert and no distress.   EYES: Eyes grossly normal to inspection, PERRL and conjunctivae and sclerae normal  HENT: ear canals and TM's normal, nose and mouth without ulcers or lesions, oropharynx clear and oral mucous membranes moist  NECK: no adenopathy, no asymmetry, masses, or scars and thyroid normal to palpation  RESP: lungs clear to auscultation - no rales, rhonchi or wheezes  BREAST: normal without masses, tenderness or nipple discharge and no palpable axillary masses or adenopathy  CV: regular rate and rhythm, normal S1 S2, no S3 or S4, no murmur, click or rub, no peripheral edema and peripheral pulses strong  ABDOMEN: soft, nontender, no hepatosplenomegaly, no masses and bowel sounds normal  MS: no musculoskeletal defects are noted and gait is age appropriate without ataxia  SKIN: no suspicious lesions or rashes  NEURO: Normal strength and tone, sensory exam grossly " "normal, mentation intact and speech normal  PSYCH: mentation appears normal and affect normal/bright    Diagnostic Test Results:  Labs reviewed in Epic    ASSESSMENT/PLAN:       ICD-10-CM    1. Encounter for routine adult medical exam with abnormal findings Z00.01    2. Major depressive disorder with single episode, in full remission (H) F32.5    3. Morbid obesity due to excess calories (H) E66.01    4. Tubular adenomas of colon - 2 in 6/2019 - repeat colonoscopy in 2024 per [facility] recommendation  D12.6    5. Postmenopausal - last menses 10/2018 Z78.0    6. Right sided abdominal pain- ? related to constipation vs. high fiber diet  R10.9    7. DONALD (generalized anxiety disorder) F41.1    8. CARDIOVASCULAR SCREENING; LDL GOAL LESS THAN 160 Z13.6 CBC with platelets     Comprehensive metabolic panel     Lipid panel reflex to direct LDL Fasting     TSH with free T4 reflex   9. Family history of diabetes mellitus- father, and multiple family members on paternal side- PGM & PGF  Z83.3    10. Need for prophylactic vaccination and inoculation against influenza- pt declines today  Z23    11. Need for shingles vaccine Z23 zoster vaccine recombinant adjuvanted (SHINGRIX) injection   12. Visit for screening mammogram Z12.31    13. Elevated glucose R73.09 Hemoglobin A1c     Right sided abdominal pain- ? related to constipation vs. high fiber diet - consider magnesium &/or Linzess per [facility] - try Try Beano - 1 tablet with each high fiber meal to help with gassiness and bloating related to high fiber diet.  Also   try eat one yogurt ( with active cultures) daily or oral probiotic otc such as Culturelle, Align. IbSium, or UltraFlora  Intensive Care  to help restore your natural gut bacteria.     COUNSELING:  Reviewed preventive health counseling, as reflected in patient instructions    Estimated body mass index is 30.06 kg/m  as calculated from the following:    Height as of this encounter: 1.772 m (5' 9.75\").    Weight as of this " encounter: 94.3 kg (208 lb).    Weight management plan: Discussed healthy diet and exercise guidelines     reports that she quit smoking about 28 years ago. Her smoking use included cigarettes. She has a 2.00 pack-year smoking history. She has never used smokeless tobacco.      Counseling Resources:  ATP IV Guidelines  Pooled Cohorts Equation Calculator  Breast Cancer Risk Calculator  FRAX Risk Assessment  ICSI Preventive Guidelines  Dietary Guidelines for Americans, 2010  USDA's MyPlate  ASA Prophylaxis  Lung CA Screening    Roxann Valdovinos MD  [facility]

## 2019-12-19 ASSESSMENT — ANXIETY QUESTIONNAIRES: GAD7 TOTAL SCORE: 0

## 2020-06-27 ENCOUNTER — NURSE TRIAGE (OUTPATIENT)
Dept: NURSING | Facility: CLINIC | Age: 50
End: 2020-06-27

## 2020-06-27 NOTE — TELEPHONE ENCOUNTER
Caller put a  Q tip in her itchy ear an Q tip came out bloody; second Q tip also had someblood on it; no ear pain and no blood running out of ear   Triage protocol reviewed   Caller does not think she scratched her ear canal and fears broken eardrum; protocol reviewed and reassurance that symptoms not consistent  with  ruptured eardrum but advised to be seen in urgent care today if concern persists   Caller understands and will comply   Lennie Hewitt RN  FNA    Additional Information    Negative: Knocked out (unconscious) > 1 minute    Negative: Difficult to awaken or acting confused (e.g., disoriented, slurred speech)    Negative: Severe neck pain    Negative: Sounds like a life-threatening emergency to the triager    Negative: Wound looks infected    Negative: Pierced ear, problems and symptoms    Negative: Caused by lightning    Negative: [1] Bleeding AND [2] won't stop after 10 minutes of direct pressure (using correct technique)    Negative: Skin is split open or gaping (or length > 1/4 inch or 6 mm)    Negative: [1] Animal or human bite AND [2] skin is broken (abraded, lacerated)    Negative: Walking is unsteady (i.e., falling or tilting to one side)    Negative: Sounds like a serious injury to the triager    Negative: [1] SEVERE pain AND [2] not improved 2 hours after pain medicine    Negative: [1] Pointed object was inserted into the ear canal AND [2] now has bleeding or earache     (Exception: doctor's ear exam)    Negative: [1] Cotton swab (e.g., Q-tip) inserted with force AND [2] pain persists > 30 minutes    Negative: [1] Bleeding recurs AND [2] from cotton swab or other minor trauma    [1] Few drops of blood AND [2] from cotton swab (Q-tip)    Negative: [1] Direct blow to area (e.g., ball, slapped hard) AND [2] bleeding from ear canal    Negative: Hearing is decreased on injured side    Negative: Ear looks swollen    Negative: [1] No prior tetanus shots (or is not fully vaccinated) AND [2] any wound  (e.g., cut, scrape)    Negative: [1] Last tetanus shot > 5 years ago AND [2] DIRTY cut or scrape    Negative: [1] Last tetanus shot > 10 years ago AND [2] CLEAN cut or scrape (e.g., object AND skin were clean)    Protocols used: EAR INJURY-A-AH

## 2020-07-19 ENCOUNTER — VIRTUAL VISIT (OUTPATIENT)
Dept: FAMILY MEDICINE | Facility: OTHER | Age: 50
End: 2020-07-19

## 2020-07-19 NOTE — PROGRESS NOTES
"Date: 2020 13:30:26  Clinician: Cece Fuentes  Clinician NPI: [id number]  Patient: Lucinda Goltz  Patient : 1970  Patient Address: [address] [place] [id number]  Patient Phone: [phone]  Visit Protocol: URI  Patient Summary:  Tona is a 49 year old ( : 1970 ) female who initiated a Visit for COVID-19 (Coronavirus) evaluation and screening. When asked the question \"Please sign me up to receive news, health information and promotions. \", Tona responded \"No\".    Tona states her symptoms started 1-2 days ago.   Her symptoms consist of malaise, ear pain, a headache, chills, a sore throat, myalgia, and facial pain or pressure.   Symptom details     Sore throat: Tona reports having mild throat pain (1-3 on a 10 point pain scale), does not have exudate on her tonsils, and can swallow liquids. She is not sure if the lymph nodes in her neck are enlarged. A rash has not appeared on the skin since the sore throat started.     Facial pain or pressure: The facial pain or pressure does not feel worse when bending or leaning forward.     Headache: She states the headache is mild (1-3 on a 10 point pain scale).      Tona denies having wheezing, nausea, teeth pain, ageusia, diarrhea, vomiting, rhinitis, anosmia, fever, cough, and nasal congestion. She also denies having recent facial or sinus surgery in the past 60 days and taking antibiotic medication in the past month. She is not experiencing dyspnea.   Precipitating events  Within the past week, Tona has not been exposed to someone with strep throat. She has not recently been exposed to someone with influenza. Tona has been in close contact with the following high risk individuals: people with asthma, heart disease or diabetes.   Pertinent COVID-19 (Coronavirus) information  In the past 14 days, Tona has not worked in a congregate living setting.   She does not work or volunteer as healthcare worker or a first " responder and does not work or volunteer in a healthcare facility.   Tona also has not lived in a congregate living setting in the past 14 days. She does not live with a healthcare worker.   Tona has not had a close contact with a laboratory-confirmed COVID-19 patient within 14 days of symptom onset.   Pertinent medical history  Tona typically gets a yeast infection when she takes antibiotics. She is not sure if she has used fluconazole (Diflucan) to treat previous yeast infections.   Tona does not need a return to work/school note.   Weight: 210 lbs   Tona does not smoke or use smokeless tobacco.   Weight: 210 lbs    MEDICATIONS: No current medications, ALLERGIES: erythromycin base  Clinician Response:  Dear Tona,   Your symptoms show that you may have coronavirus (COVID-19). This illness can cause fever, cough and trouble breathing. Many people get a mild case and get better on their own. Some people can get very sick.  What should I do?  We would like to test you for this virus.   1. Please call [phone] to schedule your visit. Explain that you were referred by [facility] to have a COVID-19 test. Be [facility].  The following will serve as your written order for this COVID Test, ordered by me, for the indication of suspected COVID [Z20.828]: The test will be ordered in [employer], our electronic health record, after you are scheduled. It will show as ordered and authorized by Yovani Elise MD.  Order: COVID-19 (Coronavirus) PCR for SYMPTOMATIC testing from [facility].      2. When it's time for your COVID test:  Stay at least 6 feet away from others. (If someone will drive you to your test, stay in the backseat, as far away from the  as you can.)   Cover your mouth and nose with a mask, tissue or washcloth.  Go straight to the testing site. Don't make any stops on the way there or back.      3.Starting now: Stay home and away from others (self-isolate) until:   You've  "had no fever---and no medicine that reduces fever---for 3 full days (72 hours). And...   Your other symptoms have gotten better. For example, your cough or breathing has improved. And...   At least 10 days have passed since your symptoms started.       During this time, don't leave the house except for testing or medical care.   Stay in your own room, even for meals. Use your own bathroom if you can.   Stay away from others in your home. No hugging, kissing or shaking hands. No visitors.  Don't go to work, school or anywhere else.    Clean \"high touch\" surfaces often (doorknobs, counters, handles, etc.). Use a household cleaning spray or wipes. You'll find a full list of  on the EPA website: www.epa.gov/pesticide-registration/list-n-disinfectants-use-against-sars-cov-2.   Cover your mouth and nose with a mask, tissue or washcloth to avoid spreading germs.  Wash your hands and face often. Use soap and water.  Caregivers in these groups are at risk for severe illness due to COVID-19:  o People 65 years and older  o People who live in a nursing home or long-term care facility  o People with chronic disease (lung, heart, cancer, diabetes, kidney, liver, immunologic)  o People who have a weakened immune system, including those who:   Are in cancer treatment  Take medicine that weakens the immune system, such as corticosteroids  Had a bone marrow or organ transplant  Have an immune deficiency  Have poorly controlled HIV or AIDS  Are obese (body mass index of 40 or higher)  Smoke regularly   o Caregivers should wear gloves while washing dishes, handling laundry and cleaning bedrooms and bathrooms.  o Use caution when washing and drying laundry: Don't shake dirty laundry, and use the warmest water setting that you can.  o For more tips, go to www.cdc.gov/coronavirus/2019-ncov/downloads/10Things.pdf.    4.Sign up for [facility]. We know it's scary to hear that you might have COVID-19. We want to track your symptoms " to make sure you're okay over the next 2 weeks. Please look for an email from [employer]---this is a free, online program that we'll use to keep in touch. To sign up, follow the link in the email. Learn more at [email or web address]  How can I take care of myself?   Get lots of rest. Drink extra fluids (unless a doctor has told you not to).   Take Tylenol (acetaminophen) for fever or pain. If you have liver or kidney problems, ask your family doctor if it's okay to take Tylenol.   Adults can take either:    650 mg (two 325 mg pills) every 4 to 6 hours, or...   1,000 mg (two 500 mg pills) every 8 hours as needed.    Note: Don't take more than 3,000 mg in one day. Acetaminophen is found in many medicines (both prescribed and over-the-counter medicines). Read all labels to be sure you don't take too much.   For children, check the Tylenol bottle for the right dose. The dose is based on the child's age or weight.    If you have other health problems (like cancer, heart failure, an organ transplant or severe kidney disease): Call your specialty clinic if you don't feel better in the next 2 days.       Know when to call 911. Emergency warning signs include:    Trouble breathing or shortness of breath Pain or pressure in the chest that doesn't go away Feeling confused like you haven't felt before, or not being able to wake up Bluish-colored lips or face.  Where can I get more information?   [facility] -- About COVID-19: [email or web address]   CDC -- What to Do If You're Sick: www.cdc.gov/coronavirus/2019-ncov/about/steps-when-sick.html   CDC -- Ending Home Isolation: www.cdc.gov/coronavirus/2019-ncov/hcp/disposition-in-home-patients.html   CDC -- Caring for Someone: www.cdc.gov/coronavirus/2019-ncov/if-you-are-sick/care-for-someone.html   [facility] -- Interim Guidance for Hospital Discharge to Home: www.health.[facility].mn./diseases/coronavirus/hcp/hospdischarge.pdf   [facility]  clinical trials (COVID-19 research studies): clinicalaffairs.[facility]-clinical-trials    Below are the COVID-19 hotlines at the [place] Department of Health ([facility]). Interpreters are available.    For health questions: Call 059-843-3778 or 1-751.867.1459 (7 a.m. to 7 p.m.) For questions about schools and childcare: Call 238-357-1107 or 1-559.794.3484 (7 a.m. to 7 p.m.)    COVID-19 (Coronavirus) General Information  Because there is currently no vaccine to prevent infection, the best way to protect yourself is to avoid being exposed to this virus. Common symptoms of COVID-19 include but are not limited to fever, cough, and shortness of breath. These symptoms appear 2-14 days after you are exposed to the virus that causes COVID-19. Click here for more information from the CDC on how to protect yourself.  If you are sick with COVID-19 or suspect you are infected with the virus that causes COVID-19, follow the steps here from the CDC to help prevent the disease from spreading to people in your home and community.  Click here for general information from the CDC on testing.  If you develop any of these emergency warning signs for COVID-19, get medical attention immediately:     Trouble breathing    Persistent pain or pressure in the chest    New confusion or inability to arouse    Bluish lips or face      Call your doctor or clinic before going in. Call 911 if you have a medical emergency and notify the  you have or think you may have COVID-19.  For more detailed and up to date information on COVID-19 (Coronavirus), please visit the CDC website.   Diagnosis: Other malaise  Diagnosis ICD: R53.81  Additional Clinician Notes: consider strep test if covid negative

## 2020-07-21 DIAGNOSIS — Z20.822 COVID-19 RULED OUT: Primary | ICD-10-CM

## 2020-07-21 PROCEDURE — U0003 INFECTIOUS AGENT DETECTION BY NUCLEIC ACID (DNA OR RNA); SEVERE ACUTE RESPIRATORY SYNDROME CORONAVIRUS 2 (SARS-COV-2) (CORONAVIRUS DISEASE [COVID-19]), AMPLIFIED PROBE TECHNIQUE, MAKING USE OF HIGH THROUGHPUT TECHNOLOGIES AS DESCRIBED BY CMS-2020-01-R: HCPCS | Performed by: FAMILY MEDICINE

## 2020-07-21 NOTE — LETTER
July 23, 2020        Lucinda B Goltz  [address] [address] [address]    This letter provides a written record that you were tested for COVID-19 on 07/21/2020.       Your result was negative. This means that we didn t find the virus that causes COVID-19 in your sample. A test may show negative when you do actually have the virus. This can happen when the virus is in the early stages of infection, before you feel illness symptoms.    If you have symptoms   Stay home and away from others (self-isolate) until you meet ALL of the guidelines below:    You ve had no fever--and no medicine that reduces fever--for 3 full days (72 hours). And      Your other symptoms have gotten better. For example, your cough or breathing has improved. And     At least 10 days have passed since your symptoms started.    During this time:    Stay home. Don t go to work, school or anywhere else.     Stay in your own room, including for meals. Use your own bathroom if you can.    Stay away from others in your home. No hugging, kissing or shaking hands. No visitors.    Clean  high touch  surfaces often (doorknobs, counters, handles, etc.). Use a household cleaning spray or wipes. You can find a full list on the EPA website at www.epa.gov/pesticide-registration/list-n-disinfectants-use-against-sars-cov-2.    Cover your mouth and nose with a mask, tissue or washcloth to avoid spreading germs.    Wash your hands and face often with soap and water.    Going back to work  Check with your employer for any guidelines to follow for going back to work.    Employers: This document serves as formal notice that your employee tested negative for COVID-19, as of the testing date shown above.

## 2020-07-22 ENCOUNTER — MYC MEDICAL ADVICE (OUTPATIENT)
Dept: FAMILY MEDICINE | Facility: CLINIC | Age: 50
End: 2020-07-22

## 2020-07-22 DIAGNOSIS — J02.9 SORE THROAT: Primary | ICD-10-CM

## 2020-07-22 LAB
SARS-COV-2 RNA SPEC QL NAA+PROBE: NOT DETECTED
SPECIMEN SOURCE: NORMAL

## 2020-07-23 ENCOUNTER — ALLIED HEALTH/NURSE VISIT (OUTPATIENT)
Dept: NURSING | Facility: CLINIC | Age: 50
End: 2020-07-23
Payer: COMMERCIAL

## 2020-07-23 DIAGNOSIS — J02.9 SORE THROAT: Primary | ICD-10-CM

## 2020-07-23 LAB
DEPRECATED S PYO AG THROAT QL EIA: NEGATIVE
SPECIMEN SOURCE: NORMAL

## 2020-07-23 PROCEDURE — 87651 STREP A DNA AMP PROBE: CPT | Performed by: FAMILY MEDICINE

## 2020-07-23 PROCEDURE — 40001204 ZZHCL STATISTIC STREP A RAPID: Performed by: FAMILY MEDICINE

## 2020-07-23 NOTE — TELEPHONE ENCOUNTER
[employer] message sent.    Davonte ALBARRAN RN   [facility]

## 2020-07-23 NOTE — TELEPHONE ENCOUNTER
"Patient had VV on 7/19/20.   Notes: \"Additional Clinician Notes: consider strep test if covid negative\"    Component  2d ago Resulting Agency    COVID-19 Virus PCR to [facility] - Source  [facility]    COVID-19 Virus PCR to [facility] - Result  Not Detected   [facility]      Routing to PCP for further review/recommendations/orders.  OK for strep order? (pended)      Aliza Pelletier RN  [facility]  "

## 2020-07-24 LAB
SPECIMEN SOURCE: NORMAL
STREP GROUP A PCR: NOT DETECTED

## 2020-07-24 NOTE — RESULT ENCOUNTER NOTE
Dear Karyna,    Here is a summary of your recent test results:  -Strep throat test showed no signs of strep. And your culture is pending.       For additional lab test information, labtestsonline.org is an excellent reference.    In addition, here is a list of due or overdue Health Maintenance reminders:    Colorectal Cancer Screening due on 05/17/2019      Please call us at [phone] (or use [employer]) to address the above recommendations if needed.           Thank you very much for trusting me and [facility].     Healthy regards,  Maury Suarez MD

## 2020-07-24 NOTE — RESULT ENCOUNTER NOTE
Dear Karyna,    Here is a summary of your recent test results:  -Strep throat culture showed no signs of strep.           Thank you very much for trusting me and [facility].     Healthy regards,  Maury Suarez MD

## 2020-11-16 ENCOUNTER — HEALTH MAINTENANCE LETTER (OUTPATIENT)
Age: 50
End: 2020-11-16

## 2021-02-07 ENCOUNTER — HEALTH MAINTENANCE LETTER (OUTPATIENT)
Age: 51
End: 2021-02-07

## 2021-02-19 NOTE — PATIENT INSTRUCTIONS
[facility]  [address] [place] [id number]  Office: [phone]   Fax:    [phone]     Return in about 1 year (around 2/22/2022) for cholesterol recheck, with Dr. Valdovinos, in person.     Preventive Health Recommendations  Female Ages 50 - 64    Yearly exam: See your health care provider every year in order to  o Review health changes.   o Discuss preventive care.    o Review your medicines if your doctor has prescribed any.      Get a Pap test every three years (unless you have an abnormal result and your provider advises testing more often).    If you get Pap tests with HPV test, you only need to test every 5 years, unless you have an abnormal result.     You do not need a Pap test if your uterus was removed (hysterectomy) and you have not had cancer.    You should be tested each year for STDs (sexually transmitted diseases) if you're at risk.     Have a mammogram every 1 to 2 years.    Have a colonoscopy at age 50, or have a yearly FIT test (stool test). These exams screen for colon cancer.      Have a cholesterol test every 5 years, or more often if advised.    Have a diabetes test (fasting glucose) every three years. If you are at risk for diabetes, you should have this test more often.     If you are at risk for osteoporosis (brittle bone disease), think about having a bone density scan (DEXA).    Shots: Get a flu shot each year. Get a tetanus shot every 10 years.    Nutrition:     Eat at least 5 servings of fruits and vegetables each day.    Eat whole-grain bread, whole-wheat pasta and brown rice instead of white grains and rice.    Get adequate Calcium and Vitamin D.     Lifestyle    Exercise at least 150 minutes a week (30 minutes a day, 5 days a week). This will help you control your weight and prevent disease.    Limit alcohol to one drink per day.    No smoking.     Wear sunscreen to prevent skin cancer.     See your dentist every six months for an exam and  cleaning.    See your eye doctor every 1 to 2 years.      Thank you so much or choosing [facility]  for your Health Care. It was a pleasure seeing you at your visit today! Please contact us with any questions or concerns you may have.                   Roxann Valdovinos MD                              To reach your [facility] care team after hours call:   [phone]    PLEASE NOTE OUR HOURS HAVE CHANGED secondary to COVID-19 coronavirus pandemic, as we are trying to minimize patient exposure to the virus,  which is now widespread in the [facility].  These hours may change with very little notice.  We apologize for any inconvenience.       Our current clinic hours are:          Monday- Thursday   7:00am - 6:00pm  in person.      Friday  7:00am- 5:00pm                       Saturday and Sunday : Closed to in person and virtual visits        We have telephone and virtual visit times available between    7:00am - 6pm on Monday-Friday as well.                                                Phone:  [phone]      Our pharmacy hours: Monday through Friday 9:00am to 5:00pm                        Saturday - 9:00 am to 12 noon       Sunday : Closed.              Phone:  [phone]              ###  Please note: at this time we are not accepting any walk-in visits. ###      There is also information available at our web site:  [email or web address]    If your provider ordered any lab tests and you do not receive the results within 10 business days, please call the clinic.    If you need a medication refill please contact your pharmacy.  Please allow 2 business days for your refill to be completed.    Our clinic offers telephone visits and e visits.  Please ask one of your team members to explain more.      Use [employer] (secure email communication and access to your chart) to send your primary care provider a message or make an appointment. Ask someone on your Team how to  sign up for MyChart.

## 2021-02-19 NOTE — PROGRESS NOTES
SUBJECTIVE:   CC: Lucinda B Goltz is an 50 year old woman who presents for preventive health visit and the following other medical problems:      1. Encounter for routine adult medical exam with abnormal findings    2. Need for hepatitis C screening test    3. Morbid obesity due to excess calories (H)    4. Tubular adenomas of colon - 2 in 6/2019 - repeat colonoscopy in 2024 per [place] [facility] recommendation     5. Family history of diabetes mellitus- father, and multiple family members on paternal side- PGM & PGF     6. Right sided abdominal pain- ? related to constipation vs. high fiber diet - consider magnesium &/or Linzess per [place] [facility]     7. Dense breast tissue    8. CARDIOVASCULAR SCREENING; LDL GOAL LESS THAN 160    9. Visit for screening mammogram    10. Screening for malignant neoplasm of cervix    11. Need for shingles vaccine    12. Postmenopausal symptoms- mild night sweats , mild hot flashes during day - no periods since 2018      notices Right sided abd pain when she doesn't drink enough water.         Patient has been advised of split billing requirements and indicates understanding: Yes  Healthy Habits:     Getting at least 3 servings of Calcium per day:  Yes    Bi-annual eye exam:  Yes    Dental care twice a year:  Yes    Sleep apnea or symptoms of sleep apnea:  None    Diet:  Regular (no restrictions)    Frequency of exercise:  2-3 days/week    Duration of exercise:  15-30 minutes    Taking medications regularly:  Not Applicable    Medication side effects:  Not applicable    PHQ-2 Total Score: 0    Additional concerns today:  No      Chronic low back pain has been a lot better.  Better when she gets up and moves.      Today's PHQ-2 Score:   PHQ-2 ( 1999 Pfizer) 2/22/2021   Q1: Little interest or pleasure in doing things 0   Q2: Feeling down, depressed or hopeless 0   PHQ-2 Score 0   Q1: Little interest or pleasure in doing things -   Q2: Feeling down, depressed or hopeless -   PHQ-2 Score -       Abuse:  Current or Past (Physical, Sexual or Emotional) - No  Do you feel safe in your environment? Yes    Have you ever done Advance Care Planning? (For example, a Health Directive, POLST, or a discussion with a medical provider or your loved ones about your wishes): No, advance care planning information given to patient to review.  Patient declined advance care planning discussion at this time.    Social History     Tobacco Use     Smoking status: Former Smoker     Packs/day: 0.50     Years: 4.00     Pack years: 2.00     Types: Cigarettes     Quit date: 11/15/1991     Years since quittin.2     Smokeless tobacco: Never Used   Substance Use Topics     Alcohol use: Yes     Comment: rare     If you drink alcohol do you typically have >3 drinks per day or >7 drinks per week? No    Alcohol Use 2021   Prescreen: >3 drinks/day or >7 drinks/week? -   Prescreen: >3 drinks/day or >7 drinks/week? No       Any new diagnosis of family breast, ovarian, or bowel cancer? Yes     Reviewed orders with patient.  Reviewed health maintenance and updated orders accordingly - Yes  Labs reviewed in EPIC  BP Readings from Last 3 Encounters:   21 100/70   19 108/60   19 104/70    Wt Readings from Last 3 Encounters:   21 108.4 kg (239 lb)   19 94.3 kg (208 lb)   19 93.4 kg (205 lb 12.8 oz)                  Patient Active Problem List   Diagnosis     Morbid obesity due to excess calories (H)     CARDIOVASCULAR SCREENING; LDL GOAL LESS THAN 160     Uterine leiomyoma, unspecified location     Premenstrual dysphoria     Lump of thigh     Localized swelling, mass, or lump of upper extremity     DDD (degenerative disc disease), lumbar     DONALD (generalized anxiety disorder)     Dense breast tissue     Family history of diabetes mellitus- father, and multiple family members on paternal side- PGM & PGF      Tubular adenomas of colon - 2 in 2019 - repeat colonoscopy in  per [place] [facility] recommendation       Postmenopausal - last menses 10/2018     Right sided abdominal pain- ? related to constipation vs. high fiber diet - consider magnesium &/or Linzess per [place] [facility]      Postmenopausal symptoms- mild night sweats , mild hot flashes during day - no periods since       Past Surgical History:   Procedure Laterality Date     C  DELIVERY ONLY      , Low Cervical x 2      COLONOSCOPY N/A 2018    Procedure: COLONOSCOPY;  Surgeon: Arnaldo Mckeon MD;  Location:  GI     COLONOSCOPY  2019    5 polyps - 2 tubular adenomas - repeat in       EXCISE MASS HEAD  3/30/2012    Procedure:EXCISE MASS HEAD; Excision of Scalp Cyst; Surgeon:HAN GONZALEZ; Location: OR       Social History     Tobacco Use     Smoking status: Former Smoker     Packs/day: 0.50     Years: 4.00     Pack years: 2.00     Types: Cigarettes     Quit date: 11/15/1991     Years since quittin.2     Smokeless tobacco: Never Used   Substance Use Topics     Alcohol use: Yes     Comment: rare     Family History   Problem Relation Age of Onset     Heart Disease Father         MI 55     Diabetes Father         Type II     Psychotic Disorder Mother         unspecified psychotic disorder - at [facility]      Heart Disease Maternal Grandmother         CHF     Cerebrovascular Disease Paternal Grandmother      Diabetes Paternal Grandmother         Type II     Cancer - colorectal Maternal Cousin 42        in her early 40's      Diabetes Paternal Grandfather         Type II     Asthma Sister          Current Outpatient Medications   Medication Sig Dispense Refill     zoster vaccine recombinant adjuvanted (SHINGRIX) injection Inject 0.5 mLs into the muscle once for 1 dose 0.5 mL 1     Allergies   Allergen Reactions     Contrast Dye      Erythromycin Nausea and Vomiting     sensitivity      Recent Labs   Lab Test 19  0905 19  1617 10/08/18  0949 10/08/18  0948 16  0929   A1C 5.2   --  5.5  --   --    LDL 71  --   --  59 67   HDL 71  --   --  42* 58   TRIG 53  --   --  58 73   ALT 23 37  --  23 29   CR 0.66 0.65  --  0.71 0.66   GFRESTIMATED >90 >90  --  88 >90  Non  GFR Calc     GFRESTBLACK >90 >90  --  >90 >90  African American GFR Calc     POTASSIUM 4.0 4.3  --  4.2 4.2   TSH 2.63  --   --  2.03 1.89        Breast CA Risk Screening:  Breast CA Risk Assessment (FHS-7) 2/22/2021   Did any of your first-degree relatives have breast or ovarian cancer? No   Did any of your relatives have bilateral breast cancer? No   Did any man in your family have breast cancer? No   Did any woman in your family have breast and ovarian cancer? No   Did any woman in your family have breast cancer before age 50 y? No   Do you have 2 or more relatives with breast and/or ovarian cancer? No   Do you have 2 or more relatives with breast and/or bowel cancer? Yes     Breast CA Risk Assessment (FHS-7) 2/22/2021   Did any of your first-degree relatives have breast or ovarian cancer? No   Did any of your relatives have bilateral breast cancer? No   Did any man in your family have breast cancer? No   Did any woman in your family have breast and ovarian cancer? No   Did any woman in your family have breast cancer before age 50 y? No   Do you have 2 or more relatives with breast and/or ovarian cancer? No   Do you have 2 or more relatives with breast and/or bowel cancer? No      Pertinent mammograms are reviewed under the imaging tab.    History of abnormal Pap smear: NO - age 30-65 PAP every 5 years with negative HPV co-testing recommended  PAP / HPV Latest Ref Rng & Units 10/8/2018 4/27/2015 2/28/2012   PAP - NIL NIL NIL   HPV 16 DNA NEG:Negative Negative Negative -   HPV 18 DNA NEG:Negative Negative Negative -   OTHER HR HPV NEG:Negative Negative Negative -     Reviewed and updated as needed this visit by clinical staff  Tobacco  Allergies  Meds  Problems  Med Hx  Surg Hx  Fam Hx          Reviewed  "and updated as needed this visit by Provider   Allergies  Meds  Problems  Med Hx   Fam Hx         Past Surgical History:   Procedure Laterality Date     C  DELIVERY ONLY      , Low Cervical x 2      COLONOSCOPY N/A 2018    Procedure: COLONOSCOPY;  Surgeon: Arnaldo Mckeon MD;  Location:  GI     COLONOSCOPY  2019    5 polyps - 2 tubular adenomas - repeat in       EXCISE MASS HEAD  3/30/2012    Procedure:EXCISE MASS HEAD; Excision of Scalp Cyst; Surgeon:HAN GONZALEZ; Location:[facility] OR     OB History    Para Term  AB Living   2 2 2 0 0 2   SAB TAB Ectopic Multiple Live Births   0 0 0 0 0      # Outcome Date GA Lbr Quan/2nd Weight Sex Delivery Anes PTL Lv   2 Term            1 Term                Review of Systems  CONSTITUTIONAL: NEGATIVE for fever, chills, change in weight  INTEGUMENTARY/SKIN: NEGATIVE for worrisome rashes, moles or lesions  EYES: NEGATIVE for vision changes or irritation  ENT: NEGATIVE for ear, mouth and throat problems  RESP: NEGATIVE for significant cough or SOB  BREAST: NEGATIVE for masses, tenderness or discharge  CV: NEGATIVE for chest pain, palpitations or peripheral edema  GI: NEGATIVE for nausea, abdominal pain, heartburn, or change in bowel habits  : NEGATIVE for unusual urinary or vaginal symptoms. No vaginal bleeding.  MUSCULOSKELETAL: NEGATIVE for significant arthralgias or myalgia  NEURO: NEGATIVE for weakness, dizziness or paresthesias  PSYCHIATRIC: NEGATIVE for changes in mood or affect      OBJECTIVE:   /70   Pulse 74   Temp 96.4  F (35.8  C)   Ht 1.778 m (5' 10\")   Wt 108.4 kg (239 lb)   LMP 2018   SpO2 99%   BMI 34.29 kg/m     Wt Readings from Last 5 Encounters:   21 108.4 kg (239 lb)   19 94.3 kg (208 lb)   19 93.4 kg (205 lb 12.8 oz)   19 99.8 kg (220 lb 2 oz)   18 97.2 kg (214 lb 3.2 oz)       Physical Exam  GENERAL APPEARANCE: healthy, alert and no distress  EYES: " Eyes grossly normal to inspection, PERRL and conjunctivae and sclerae normal  HENT: ear canals and TM's normal, nose and mouth without ulcers or lesions, oropharynx clear and oral mucous membranes moist  NECK: no adenopathy, no asymmetry, masses, or scars and thyroid normal to palpation  RESP: lungs clear to auscultation - no rales, rhonchi or wheezes  BREAST: normal without masses, tenderness or nipple discharge and no palpable axillary masses or adenopathy  CV: regular rate and rhythm, normal S1 S2, no S3 or S4, no murmur, click or rub, no peripheral edema and peripheral pulses strong  ABDOMEN: soft, nontender, no hepatosplenomegaly, no masses and bowel sounds normal   (female): normal female external genitalia, normal urethral meatus, vaginal mucosal atrophy noted, normal cervix, adnexae, and uterus without masses or abnormal discharge  MS: no musculoskeletal defects are noted and gait is age appropriate without ataxia  SKIN: no suspicious lesions or rashes  NEURO: Normal strength and tone, sensory exam grossly normal, mentation intact and speech normal  PSYCH: mentation appears normal and affect normal/bright    Diagnostic Test Results:  Labs reviewed in Epic    ASSESSMENT/PLAN:       ICD-10-CM    1. Encounter for routine adult medical exam with abnormal findings  Z00.01 REVIEW OF HEALTH MAINTENANCE PROTOCOL ORDERS     TSH with free T4 reflex     **A1C FUTURE 1yr     Lipid panel reflex to direct LDL Fasting     Comprehensive metabolic panel (BMP + Alb, Alk Phos, ALT, AST, Total. Bili, TP)     CBC with platelets and differential   2. Need for hepatitis C screening test  Z11.59 Hepatitis C Screen Reflex to HCV RNA Quant and Genotype   3. Morbid obesity due to excess calories (H)  E66.01    4. Tubular adenomas of colon - 2 in 6/2019 - repeat colonoscopy in 2024 per [place] [facility] recommendation   D12.6    5. Family history of diabetes mellitus- father, and multiple family members on paternal side- PGM & PGF   Z83.3    6.  "Right sided abdominal pain- ? related to constipation vs. high fiber diet - consider magnesium &/or Linzess per [place] [facility]   R10.9    7. Dense breast tissue  R92.2    8. CARDIOVASCULAR SCREENING; LDL GOAL LESS THAN 160  Z13.6 CANCELED: CBC with platelets differential     CANCELED: Comprehensive metabolic panel     CANCELED: Lipid panel reflex to direct LDL Fasting     CANCELED: TSH with free T4 reflex   9. Visit for screening mammogram  Z12.31 MA Screen Bilateral w/Rodriguez   10. Screening for malignant neoplasm of cervix  Z12.4 Pap imaged thin layer screen with HPV - recommended age 30 - 65 years (select HPV order below)     HPV High Risk Types DNA Cervical   11. Need for shingles vaccine  Z23 zoster vaccine recombinant adjuvanted (SHINGRIX) injection   12. Postmenopausal symptoms- mild night sweats , mild hot flashes during day - no periods since 2018   N95.9        Patient has been advised of split billing requirements and indicates understanding: Yes  COUNSELING:  Reviewed preventive health counseling, as reflected in patient instructions    Estimated body mass index is 34.29 kg/m  as calculated from the following:    Height as of this encounter: 1.778 m (5' 10\").    Weight as of this encounter: 108.4 kg (239 lb).    Weight management plan: Discussed healthy diet and exercise guidelines    She reports that she quit smoking about 29 years ago. Her smoking use included cigarettes. She has a 2.00 pack-year smoking history. She has never used smokeless tobacco.      Counseling Resources:  ATP IV Guidelines  Pooled Cohorts Equation Calculator  Breast Cancer Risk Calculator  BRCA-Related Cancer Risk Assessment: FHS-7 Tool  FRAX Risk Assessment  ICSI Preventive Guidelines  Dietary Guidelines for Americans, 2010  USDA's MyPlate  ASA Prophylaxis  Lung CA Screening           Roxann Valdovinos MD  [facility]"

## 2021-02-22 ENCOUNTER — OFFICE VISIT (OUTPATIENT)
Dept: FAMILY MEDICINE | Facility: CLINIC | Age: 51
End: 2021-02-22
Payer: COMMERCIAL

## 2021-02-22 ENCOUNTER — HOSPITAL ENCOUNTER (OUTPATIENT)
Dept: MAMMOGRAPHY | Facility: CLINIC | Age: 51
Discharge: HOME | End: 2021-02-22
Attending: FAMILY MEDICINE | Admitting: FAMILY MEDICINE
Payer: COMMERCIAL

## 2021-02-22 VITALS
DIASTOLIC BLOOD PRESSURE: 70 MMHG | SYSTOLIC BLOOD PRESSURE: 100 MMHG | HEIGHT: 70 IN | TEMPERATURE: 96.4 F | HEART RATE: 74 BPM | WEIGHT: 239 LBS | BODY MASS INDEX: 34.22 KG/M2 | OXYGEN SATURATION: 99 %

## 2021-02-22 DIAGNOSIS — Z12.4 SCREENING FOR MALIGNANT NEOPLASM OF CERVIX: ICD-10-CM

## 2021-02-22 DIAGNOSIS — Z13.6 CARDIOVASCULAR SCREENING; LDL GOAL LESS THAN 160: ICD-10-CM

## 2021-02-22 DIAGNOSIS — E87.8 HYPERCHLOREMIA: ICD-10-CM

## 2021-02-22 DIAGNOSIS — E87.0 HYPERNATREMIA: ICD-10-CM

## 2021-02-22 DIAGNOSIS — R92.30 DENSE BREAST TISSUE: ICD-10-CM

## 2021-02-22 DIAGNOSIS — Z23 NEED FOR SHINGLES VACCINE: ICD-10-CM

## 2021-02-22 DIAGNOSIS — Z12.31 VISIT FOR SCREENING MAMMOGRAM: ICD-10-CM

## 2021-02-22 DIAGNOSIS — R10.9 RIGHT SIDED ABDOMINAL PAIN: ICD-10-CM

## 2021-02-22 DIAGNOSIS — N95.9 POSTMENOPAUSAL SYMPTOMS: ICD-10-CM

## 2021-02-22 DIAGNOSIS — Z11.59 NEED FOR HEPATITIS C SCREENING TEST: ICD-10-CM

## 2021-02-22 DIAGNOSIS — E66.01 MORBID OBESITY DUE TO EXCESS CALORIES (H): ICD-10-CM

## 2021-02-22 DIAGNOSIS — Z83.3 FAMILY HISTORY OF DIABETES MELLITUS: ICD-10-CM

## 2021-02-22 DIAGNOSIS — D12.6 TUBULAR ADENOMA OF COLON: ICD-10-CM

## 2021-02-22 DIAGNOSIS — Z00.01 ENCOUNTER FOR ROUTINE ADULT MEDICAL EXAM WITH ABNORMAL FINDINGS: Primary | ICD-10-CM

## 2021-02-22 LAB
ALBUMIN SERPL-MCNC: 3.4 G/DL (ref 3.4–5)
ALP SERPL-CCNC: 112 U/L (ref 40–150)
ALT SERPL W P-5'-P-CCNC: 23 U/L (ref 0–50)
ANION GAP SERPL CALCULATED.3IONS-SCNC: 9 MMOL/L (ref 3–14)
AST SERPL W P-5'-P-CCNC: 12 U/L (ref 0–45)
BASOPHILS NFR BLD AUTO: 0.8 %
BILIRUB SERPL-MCNC: 0.4 MG/DL (ref 0.2–1.3)
BUN SERPL-MCNC: 13 MG/DL (ref 7–30)
CALCIUM SERPL-MCNC: 8.5 MG/DL (ref 8.5–10.1)
CHLORIDE SERPL-SCNC: 113 MMOL/L (ref 94–109)
CHOLEST SERPL-MCNC: 122 MG/DL
CO2 SERPL-SCNC: 24 MMOL/L (ref 20–32)
CREAT SERPL-MCNC: 0.67 MG/DL (ref 0.52–1.04)
DIFFERENTIAL METHOD BLD: NORMAL
EOSINOPHIL NFR BLD AUTO: 5 %
ERYTHROCYTE [DISTWIDTH] IN BLOOD BY AUTOMATED COUNT: 13 % (ref 10–15)
GFR SERPL CREATININE-BSD FRML MDRD: >90 ML/MIN/{1.73_M2}
GLUCOSE SERPL-MCNC: 85 MG/DL (ref 70–99)
HCT VFR BLD AUTO: 38.5 % (ref 35–47)
HCV AB SERPL QL IA: NONREACTIVE
HDLC SERPL-MCNC: 59 MG/DL
HGB BLD-MCNC: 12.6 G/DL (ref 11.7–15.7)
LDLC SERPL CALC-MCNC: 54 MG/DL
LYMPHOCYTES NFR BLD AUTO: 44.5 %
MCH RBC QN AUTO: 31.1 PG (ref 26.5–33)
MCHC RBC AUTO-ENTMCNC: 32.7 G/DL (ref 31.5–36.5)
MCV RBC AUTO: 95 FL (ref 78–100)
MONOCYTES NFR BLD AUTO: 8.9 %
NEUTROPHILS NFR BLD AUTO: 40.8 %
NONHDLC SERPL-MCNC: 63 MG/DL
PLATELET # BLD AUTO: 190 10E9/L (ref 150–450)
POTASSIUM SERPL-SCNC: 3.7 MMOL/L (ref 3.4–5.3)
PROT SERPL-MCNC: 6.4 G/DL (ref 6.8–8.8)
RBC # BLD AUTO: 4.05 10E12/L (ref 3.8–5.2)
SODIUM SERPL-SCNC: 146 MMOL/L (ref 133–144)
TRIGL SERPL-MCNC: 43 MG/DL
TSH SERPL DL<=0.005 MIU/L-ACNC: 2.26 MU/L (ref 0.4–4)
WBC # BLD AUTO: 4.8 10E9/L (ref 4–11)

## 2021-02-22 PROCEDURE — 86803 HEPATITIS C AB TEST: CPT | Performed by: FAMILY MEDICINE

## 2021-02-22 PROCEDURE — 99396 PREV VISIT EST AGE 40-64: CPT | Performed by: FAMILY MEDICINE

## 2021-02-22 PROCEDURE — G0145 SCR C/V CYTO,THINLAYER,RESCR: HCPCS | Performed by: FAMILY MEDICINE

## 2021-02-22 PROCEDURE — 80050 GENERAL HEALTH PANEL: CPT | Performed by: FAMILY MEDICINE

## 2021-02-22 PROCEDURE — 80061 LIPID PANEL: CPT | Performed by: FAMILY MEDICINE

## 2021-02-22 PROCEDURE — 87624 HPV HI-RISK TYP POOLED RSLT: CPT | Performed by: FAMILY MEDICINE

## 2021-02-22 PROCEDURE — 77063 BREAST TOMOSYNTHESIS BI: CPT

## 2021-02-22 PROCEDURE — 36415 COLL VENOUS BLD VENIPUNCTURE: CPT | Performed by: FAMILY MEDICINE

## 2021-02-22 ASSESSMENT — MIFFLIN-ST. JEOR: SCORE: 1774.35

## 2021-02-25 ENCOUNTER — MYC MEDICAL ADVICE (OUTPATIENT)
Dept: FAMILY MEDICINE | Facility: CLINIC | Age: 51
End: 2021-02-25

## 2021-02-25 LAB
COPATH REPORT: NORMAL
PAP: NORMAL

## 2021-02-26 NOTE — TELEPHONE ENCOUNTER
PCP has not reviewed results yet  Roxiet sent      Aliza Pelletier RN  [facility]  [place]

## 2021-03-15 PROBLEM — E87.8 HYPERCHLOREMIA: Status: ACTIVE | Noted: 2021-03-15

## 2021-03-15 PROBLEM — E87.0 HYPERNATREMIA: Status: ACTIVE | Noted: 2021-03-15

## 2021-09-18 ENCOUNTER — HEALTH MAINTENANCE LETTER (OUTPATIENT)
Age: 51
End: 2021-09-18

## 2022-04-06 ENCOUNTER — HOSPITAL ENCOUNTER (OUTPATIENT)
Dept: MAMMOGRAPHY | Facility: CLINIC | Age: 52
Discharge: HOME | End: 2022-04-06
Attending: FAMILY MEDICINE | Admitting: FAMILY MEDICINE
Payer: COMMERCIAL

## 2022-04-06 DIAGNOSIS — Z12.31 VISIT FOR SCREENING MAMMOGRAM: ICD-10-CM

## 2022-04-06 PROCEDURE — 77067 SCR MAMMO BI INCL CAD: CPT

## 2022-04-06 NOTE — LETTER
[facility]  [address] [place] [address]  [phone]                    April 21, 2022    Lucinda B Goltz  [address] [address]      Dear Tona,    Here is a summary of your recent test results:    Mammogram was normal.  ADVISE: rechecking in 1 year.       Your test results are enclosed.      Please contact me if you have any questions.           Thank you very much for trusting [facility].     Healthy regards,         Roxann Valdovinos M.D.          Results for orders placed or performed during the hospital encounter of 04/06/22   MA Screen Bilateral w/Rodriguez     Status: None    Narrative    BILATERAL FULL FIELD DIGITAL SCREENING MAMMOGRAM WITH TOMOSYNTHESIS    Performed on: 4/6/22    Compared to: 02/22/2021 and 12/18/2019    Technique:  This study was evaluated with the assistance of Computer-Aided   Detection.  Breast Tomosynthesis was used in interpretation.    Findings: The breasts have scattered areas of fibroglandular density.    There is no radiographic evidence of malignancy.     IMPRESSION: ACR BI-RADS Category 1: Negative    RECOMMENDED FOLLOW-UP: Annual routine screening mammogram    The results and recommendations of this examination will be communicated   to the patient.

## 2022-04-07 NOTE — RESULT ENCOUNTER NOTE
Dear Karyna,    Here is a summary of your recent test results:    Mammogram was normal.  ADVISE: rechecking in 1 year.    In addition, here is a list of due or overdue Health Maintenance reminders:    COVID-19 Vaccine(1) Never done  Zoster (Shingles) Vaccine(1 of 2) Never done  Flu Vaccine(1) due on 09/01/2021  Preventive Care Visit due on 02/22/2022  ANNUAL REVIEW OF HM ORDERS due on 02/22/2022    Please call us at [phone] (or use ) to address the above recommendations or have any questions.    Thank you for choosing [facility].  It was an honor and a privilege to participate in your care today.       Healthy regards,    Melinda LANE (covering for Dr. Valdovinos)

## 2022-04-21 NOTE — RESULT ENCOUNTER NOTE
Note to Staff: please send a result letter    Dear Karyna,     Here is a summary of your recent test results:we were unable to get a hold of you on your mychart.      Mammogram was normal.  ADVISE: rechecking in 1 year.     In addition, here is a list of due or overdue Health Maintenance reminders:     COVID-19 Vaccine(1) Never done  Zoster (Shingles) Vaccine(1 of 2) Never done  Flu Vaccine(1) due on 09/01/2021  Preventive Care Visit due on 02/22/2022  ANNUAL REVIEW OF HM ORDERS due on 02/22/2022     Please call us at [phone] (or use [facility]) to address the above recommendations or have any questions.     Thank you for choosing [facility].  It was an honor and a privilege to participate in your care today.         Healthy regards,     Melinda LANE (covering for Dr. Valdovinos)

## 2022-04-30 ENCOUNTER — HEALTH MAINTENANCE LETTER (OUTPATIENT)
Age: 52
End: 2022-04-30

## 2022-05-18 ENCOUNTER — OFFICE VISIT (OUTPATIENT)
Dept: FAMILY MEDICINE | Facility: CLINIC | Age: 52
End: 2022-05-18
Payer: COMMERCIAL

## 2022-05-18 VITALS — DIASTOLIC BLOOD PRESSURE: 76 MMHG | SYSTOLIC BLOOD PRESSURE: 112 MMHG

## 2022-05-18 DIAGNOSIS — D22.9 MULTIPLE BENIGN NEVI: Primary | ICD-10-CM

## 2022-05-18 DIAGNOSIS — L82.1 SEBORRHEIC KERATOSES: ICD-10-CM

## 2022-05-18 DIAGNOSIS — D18.01 CHERRY ANGIOMA: ICD-10-CM

## 2022-05-18 DIAGNOSIS — L72.11 PILAR CYST: ICD-10-CM

## 2022-05-18 DIAGNOSIS — L81.4 LENTIGINES: ICD-10-CM

## 2022-05-18 DIAGNOSIS — Z12.83 ENCOUNTER FOR SCREENING FOR MALIGNANT NEOPLASM OF SKIN: ICD-10-CM

## 2022-05-18 DIAGNOSIS — D17.30 LIPOMA OF SKIN AND SUBCUTANEOUS TISSUE: ICD-10-CM

## 2022-05-18 PROCEDURE — 99203 OFFICE O/P NEW LOW 30 MIN: CPT | Performed by: NURSE PRACTITIONER

## 2022-05-18 NOTE — PROGRESS NOTES
[facility] Dermatology Note  Encounter Date: May 18, 2022  Office Visit     Reviewed patients past medical history and pertinent chart review prior to patients visit today.     Dermatology Problem List:  Pilar cyst, vertex scalp. Scheduled for excision in September 1, 2022  Lipoma, right volar forearm    ____________________________________________    Assessment & Plan:   #Pilar cyst, vertex scalp.   - Benign, no further treatment needed. Discussed excision if patient is bothered by the lesion due to irritation. Patient prefers to have the lesion removed and is aware of the risk of infection, scar, incomplete removal and recurrence. Patient scheduled follow up with another provider for removal at their convenience at the end of appointment today.      # lipoma, right volar forearm. Benign, no further treatment needed.  If she desires surgical removal we can schedule her for excision with one of our surgeons.  Patient declines today    # Benign skin findings including: seborrheic keratoses, cherry angioma, lentigines and benign nevi.   - No further intervention required. Patient to report changes.   - Patient reassured of the benign nature of these lesions.    #Signs and Symptoms of non-melanoma skin cancer and ABCDEs of melanoma reviewed with patient. Patient encouraged to perform monthly self skin exams and educated on how to perform them. UV precautions reviewed with patient. Patient was asked about new or changing moles/lesions on body.     #Reviewed Sunscreen: Apply 20 minutes prior to going outdoors and reapply every two hours, when wet or sweating. We recommend using an SPF 30 or higher, and to use one that is water resistant.       Follow-up:  1-2 years for follow up full body skin exam, prn for new or changing lesions or new concerns    Hyacinth Kearney, HILARIO CNP on 5/18/2022 at 4:14 PM    ____________________________________________    CC: Skin Check (Concerning mole on  chest)    HPI:  Ms. Lucinda B Goltz is a(n) 51 year old female who presents today as a new patient for a full body skin cancer screening. Patient has concerns today about a cyst on the top of the scalp that is bothersome to her.  She has had it for a while and would like it removed.  She also has a lump on her right forearm that she does not like the looks of.  She also has a purple spot on the chest that she would like checked.  She thinks it is newer and cannot remember it being there before.  It is asymptomatic.     Patient is otherwise feeling well, without additional skin concerns.     Physical Exam:  Vitals: /76   LMP 08/20/2018   SKIN: Total skin excluding the genitalia areas was performed. The exam included the head/face, neck, both arms, chest, back, abdomen, both legs, digits, mons pubis, buttock and nails.   -Vertex scalp, 1 cm movable firm subcutaneous nodule  -Right volar forearm, 2.5 cm soft subcutaneous nodule  -Left chest is about a 2 mm purple blanchable macule  -several 1-2mm red dome shaped symmetric papules scattered on the trunk  -multiple tan/brown flat round macules and raised papules scattered throughout trunk, extremities and head. No worrisome features for malignancy noted on examination.  -scattered tan, homogenous macules scattered on sun exposed areas of trunk, extremities and face.   -scattered waxy, stuck on tan/brown papules and patches on the trunk     - No other lesions of concern on areas examined.     Medications:  Current Outpatient Medications   Medication     Ascorbic Acid (VITAMIN C PO)     B Complex Vitamins (VITAMIN B COMPLEX PO)     MAGNESIUM OXIDE PO     Multiple Vitamins-Minerals (ZINC PO)     Omega-3 Fatty Acids (FISH OIL PO)     VITAMIN D PO     No current facility-administered medications for this visit.      Past Medical History:   Patient Active Problem List   Diagnosis     Morbid obesity due to excess calories (H)     CARDIOVASCULAR SCREENING; LDL GOAL LESS  THAN 160     Uterine leiomyoma, unspecified location     Premenstrual dysphoria     Lump of thigh     Localized swelling, mass, or lump of upper extremity     DDD (degenerative disc disease), lumbar     DONALD (generalized anxiety disorder)     Dense breast tissue     Family history of diabetes mellitus- father, and multiple family members on paternal side- PGM & PGF      Tubular adenomas of colon - 2 in 6/2019 - repeat colonoscopy in 2024 per [place] [facility] recommendation      Postmenopausal - last menses 10/2018     Right sided abdominal pain- ? related to constipation vs. high fiber diet - consider magnesium &/or Linzess per [place] [facility]      Postmenopausal symptoms- mild night sweats , mild hot flashes during day - no periods since 2018      Hypernatremia - ? from fasting      Hyperchloremia - ? from fasting      Past Medical History:   Diagnosis Date     Adjustment disorder with depressed mood- mild 4/27/2015     Morbid obesity (H)      Postmenopausal - last menses 10/2018     last menses 10/2018 - postmenopausal     Scalp mass        CC Referred Self, MD  No address on file on close of this encounter.

## 2022-05-18 NOTE — LETTER
5/18/2022         RE: Lucinda B Goltz  [address] [facility] [id number]        Dear Colleague,    Thank you for referring your patient, Lucinda B Goltz, to the [facility]. Please see a copy of my visit note below.    [facility] Dermatology Note  Encounter Date: May 18, 2022  Office Visit     Reviewed patients past medical history and pertinent chart review prior to patients visit today.     Dermatology Problem List:  Pilar cyst, vertex scalp. Scheduled for excision in September 1, 2022  Lipoma, right volar forearm    ____________________________________________    Assessment & Plan:   #Pilar cyst, vertex scalp.   - Benign, no further treatment needed. Discussed excision if patient is bothered by the lesion due to irritation. Patient prefers to have the lesion removed and is aware of the risk of infection, scar, incomplete removal and recurrence. Patient scheduled follow up with another provider for removal at their convenience at the end of appointment today.      # lipoma, right volar forearm. Benign, no further treatment needed.  If she desires surgical removal we can schedule her for excision with one of our surgeons.  Patient declines today    # Benign skin findings including: seborrheic keratoses, cherry angioma, lentigines and benign nevi.   - No further intervention required. Patient to report changes.   - Patient reassured of the benign nature of these lesions.    #Signs and Symptoms of non-melanoma skin cancer and ABCDEs of melanoma reviewed with patient. Patient encouraged to perform monthly self skin exams and educated on how to perform them. UV precautions reviewed with patient. Patient was asked about new or changing moles/lesions on body.     #Reviewed Sunscreen: Apply 20 minutes prior to going outdoors and reapply every two hours, when wet or sweating. We recommend using an SPF 30 or higher, and to use one that is water resistant.        Follow-up:  1-2 years for follow up full body skin exam, prn for new or changing lesions or new concerns    Hyacinth Kearney, APRN CNP on 5/18/2022 at 4:14 PM    ____________________________________________    CC: Skin Check (Concerning mole on chest)    HPI:  Ms. Lucinda B Goltz is a(n) 51 year old female who presents today as a new patient for a full body skin cancer screening. Patient has concerns today about a cyst on the top of the scalp that is bothersome to her.  She has had it for a while and would like it removed.  She also has a lump on her right forearm that she does not like the looks of.  She also has a purple spot on the chest that she would like checked.  She thinks it is newer and cannot remember it being there before.  It is asymptomatic.     Patient is otherwise feeling well, without additional skin concerns.     Physical Exam:  Vitals: /76   LMP 08/20/2018   SKIN: Total skin excluding the genitalia areas was performed. The exam included the head/face, neck, both arms, chest, back, abdomen, both legs, digits, mons pubis, buttock and nails.   -Vertex scalp, 1 cm movable firm subcutaneous nodule  -Right volar forearm, 2.5 cm soft subcutaneous nodule  -Left chest is about a 2 mm purple blanchable macule  -several 1-2mm red dome shaped symmetric papules scattered on the trunk  -multiple tan/brown flat round macules and raised papules scattered throughout trunk, extremities and head. No worrisome features for malignancy noted on examination.  -scattered tan, homogenous macules scattered on sun exposed areas of trunk, extremities and face.   -scattered waxy, stuck on tan/brown papules and patches on the trunk     - No other lesions of concern on areas examined.     Medications:  Current Outpatient Medications   Medication     Ascorbic Acid (VITAMIN C PO)     B Complex Vitamins (VITAMIN B COMPLEX PO)     MAGNESIUM OXIDE PO     Multiple Vitamins-Minerals (ZINC PO)     Omega-3 Fatty Acids (FISH  OIL PO)     VITAMIN D PO     No current facility-administered medications for this visit.      Past Medical History:   Patient Active Problem List   Diagnosis     Morbid obesity due to excess calories (H)     CARDIOVASCULAR SCREENING; LDL GOAL LESS THAN 160     Uterine leiomyoma, unspecified location     Premenstrual dysphoria     Lump of thigh     Localized swelling, mass, or lump of upper extremity     DDD (degenerative disc disease), lumbar     DONALD (generalized anxiety disorder)     Dense breast tissue     Family history of diabetes mellitus- father, and multiple family members on paternal side- PGM & PGF      Tubular adenomas of colon - 2 in 6/2019 - repeat colonoscopy in 2024 per [facility] recommendation      Postmenopausal - last menses 10/2018     Right sided abdominal pain- ? related to constipation vs. high fiber diet - consider magnesium &/or Linzess per [facility]      Postmenopausal symptoms- mild night sweats , mild hot flashes during day - no periods since 2018      Hypernatremia - ? from fasting      Hyperchloremia - ? from fasting      Past Medical History:   Diagnosis Date     Adjustment disorder with depressed mood- mild 4/27/2015     Morbid obesity (H)      Postmenopausal - last menses 10/2018     last menses 10/2018 - postmenopausal     Scalp mass        CC Referred Self, MD  No address on file on close of this encounter.      Again, thank you for allowing me to participate in the care of your patient.        Sincerely,        HILARIO Díaz CNP

## 2022-09-01 ENCOUNTER — OFFICE VISIT (OUTPATIENT)
Dept: DERMATOLOGY | Facility: CLINIC | Age: 52
End: 2022-09-01
Payer: COMMERCIAL

## 2022-09-01 DIAGNOSIS — L72.11 PILAR CYST: Primary | ICD-10-CM

## 2022-09-01 PROCEDURE — 88331 PATH CONSLTJ SURG 1 BLK 1SPC: CPT | Performed by: DERMATOLOGY

## 2022-09-01 PROCEDURE — 12031 INTMD RPR S/A/T/EXT 2.5 CM/<: CPT | Performed by: DERMATOLOGY

## 2022-09-01 PROCEDURE — 11422 EXC H-F-NK-SP B9+MARG 1.1-2: CPT | Mod: 51 | Performed by: DERMATOLOGY

## 2022-09-01 ASSESSMENT — PAIN SCALES - GENERAL: PAINLEVEL: NO PAIN (0)

## 2022-09-01 NOTE — PROGRESS NOTES
Lucinda B Goltz is an extremely pleasant 52 year old year old female patient here today for evaluation and managment of pilar cyst on scalp.   Patient has no other skin complaints today.  Remainder of the HPI, Meds, PMH, Allergies, FH, and SH was reviewed in chart.      Past Medical History:   Diagnosis Date     Adjustment disorder with depressed mood- mild 2015     Morbid obesity (H)      Postmenopausal - last menses 10/2018     last menses 10/2018 - postmenopausal     Scalp mass        Past Surgical History:   Procedure Laterality Date     COLONOSCOPY N/A 2018    Procedure: COLONOSCOPY;  Surgeon: Arnaldo Mckeon MD;  Location:  GI     COLONOSCOPY  2019    5 polyps - 2 tubular adenomas - repeat in       EXCISE MASS HEAD  3/30/2012    Procedure:EXCISE MASS HEAD; Excision of Scalp Cyst; Surgeon:HAN GONZALEZ; Location:[facility] OR     ZZC  DELIVERY ONLY      , Low Cervical x 2         Family History   Problem Relation Age of Onset     Heart Disease Father         MI 55     Diabetes Father         Type II     Psychotic Disorder Mother         unspecified psychotic disorder - at [facility] [place] [facility]      Heart Disease Maternal Grandmother         CHF     Cerebrovascular Disease Paternal Grandmother      Diabetes Paternal Grandmother         Type II     Cancer - colorectal Maternal Cousin 42        in her early 40's      Diabetes Paternal Grandfather         Type II     Asthma Sister      Breast Cancer No family hx of      Ovarian Cancer No family hx of        Social History     Socioeconomic History     Marital status:      Spouse name: Romel     Number of children: 2     Years of education: 14     Highest education level: Not on file   Occupational History     Occupation: HR at [employer]      Employer: [employer] [address] [employer]   Tobacco Use     Smoking status: Former Smoker     Packs/day: 0.50     Years: 4.00     Pack years:  2.00     Types: Cigarettes     Quit date: 11/15/1991     Years since quittin.8     Smokeless tobacco: Never Used   Substance and Sexual Activity     Alcohol use: Yes     Comment: rare     Drug use: No     Comment: no herbal meds either      Sexual activity: Yes     Partners: Male     Comment: last menses 10/2018 - postmenopausal   Other Topics Concern      Service No     Blood Transfusions No     Caffeine Concern Yes     Comment: 6 cups coffee in am , 1 pop daily      Occupational Exposure Not Asked     Hobby Hazards Not Asked     Sleep Concern Not Asked     Stress Concern Not Asked     Weight Concern Yes     Special Diet Not Asked     Back Care Not Asked     Exercise Yes     Comment: not regular cardio, does yoga once per week - encouraged regular walking program      Bike Helmet Not Asked     Seat Belt Yes     Comment: always      Self-Exams Yes     Comment: SBE encouraged monthly     Parent/sibling w/ CABG, MI or angioplasty before 65F 55M? Yes     Comment: Dad @ age 55   Social History Narrative    SonTino, graduating from college, [employer] - finishing in 2018 - getting a teaching degree.     Daughter, Brittany, got  to Ed 2017.   -new last name  Arun - turned age 24 summer of 2018.      Social Determinants of Health     Financial Resource Strain: Not on file   Food Insecurity: Not on file   Transportation Needs: Not on file   Physical Activity: Not on file   Stress: Not on file   Social Connections: Not on file   Intimate Partner Violence: Not on file   Housing Stability: Not on file       Outpatient Encounter Medications as of 2022   Medication Sig Dispense Refill     Ascorbic Acid (VITAMIN C PO)        B Complex Vitamins (VITAMIN B COMPLEX PO)        MAGNESIUM OXIDE PO        Multiple Vitamins-Minerals (ZINC PO)        Omega-3 Fatty Acids (FISH OIL PO)        VITAMIN D PO        No facility-administered encounter medications on file as of 2022.             O:    NAD, WDWN, Alert & Oriented, Mood & Affect wnl, Vitals stable   Here today alone   LMP 08/20/2018   Breastfeeding No    General appearance normal   Vitals stable   Alert, oriented and in no acute distress     R vertex 1.1cm nodule      Eyes: Conjunctivae/lids:Normal     ENT: Lips, buccal mucosa, tongue: normal    MSK:Normal    Cardiovascular: peripheral edema none    Pulm: Breathing Normal    Neuro/Psych: Orientation:Alert and Orientedx3 ; Mood/Affect:normal       MICRO:   R vertex:dermal cyst lined by stratified squamous epithelium demonstrating tricholemmal keratinization with abrupt change into eosinophilic staining keratin within the lumen.     A/P:  1. Pilar cyst  Scar and recurrence discussed with patient   EXCISION OF CYST AND INT: After thorough discussion of PGACAC, consent obtained, anesthesia and prep, the margins of the cyst were identified and an incision was made encompassing the cyst. The incisions were made through the skin and down to and including the subcutaneous tissue. The cyst was removed en bloc and submitted for frozen pathologic review. hemostasis was achieved. The wound edges were then closed in a layered fashion with 4 vicryl and staples, being careful not to leave any dead space. Postoperative length was 1.1 cm.   EBL minimal; complications none; wound care routine. The patient was discharged in good condition and will return in one week for wound evaluation.  It was a pleasure speaking to Lucinda B Goltz today.

## 2022-09-01 NOTE — LETTER
2022         RE: Lucinda B Goltz  [address] [facility] [address] [id number]        Dear Colleague,    Thank you for referring your patient, Lucinda B Goltz, to the [facility]. Please see a copy of my visit note below.    Lucinda B Goltz is an extremely pleasant 52 year old year old female patient here today for evaluation and managment of pilar cyst on scalp.   Patient has no other skin complaints today.  Remainder of the HPI, Meds, PMH, Allergies, FH, and SH was reviewed in chart.      Past Medical History:   Diagnosis Date     Adjustment disorder with depressed mood- mild 2015     Morbid obesity (H)      Postmenopausal - last menses 10/2018     last menses 10/2018 - postmenopausal     Scalp mass        Past Surgical History:   Procedure Laterality Date     COLONOSCOPY N/A 2018    Procedure: COLONOSCOPY;  Surgeon: Arnaldo Mckeon MD;  Location:  GI     COLONOSCOPY  2019    5 polyps - 2 tubular adenomas - repeat in       EXCISE MASS HEAD  3/30/2012    Procedure:EXCISE MASS HEAD; Excision of Scalp Cyst; Surgeon:HAN GONZALEZ; Location:[facility] OR     Z  DELIVERY ONLY      , Low Cervical x 2         Family History   Problem Relation Age of Onset     Heart Disease Father         MI 55     Diabetes Father         Type II     Psychotic Disorder Mother         unspecified psychotic disorder - at [facility] [place] [facility]      Heart Disease Maternal Grandmother         CHF     Cerebrovascular Disease Paternal Grandmother      Diabetes Paternal Grandmother         Type II     Cancer - colorectal Maternal Cousin 42        in her early 40's      Diabetes Paternal Grandfather         Type II     Asthma Sister      Breast Cancer No family hx of      Ovarian Cancer No family hx of        Social History     Socioeconomic History     Marital status:      Spouse name: Romel     Number of children: 2     Years of  education: 14     Highest education level: Not on file   Occupational History     Occupation: HR at [employer]      Employer: [employer]   Tobacco Use     Smoking status: Former Smoker     Packs/day: 0.50     Years: 4.00     Pack years: 2.00     Types: Cigarettes     Quit date: 11/15/1991     Years since quittin.8     Smokeless tobacco: Never Used   Substance and Sexual Activity     Alcohol use: Yes     Comment: rare     Drug use: No     Comment: no herbal meds either      Sexual activity: Yes     Partners: Male     Comment: last menses 10/2018 - postmenopausal   Other Topics Concern      Service No     Blood Transfusions No     Caffeine Concern Yes     Comment: 6 cups coffee in am , 1 pop daily      Occupational Exposure Not Asked     Hobby Hazards Not Asked     Sleep Concern Not Asked     Stress Concern Not Asked     Weight Concern Yes     Special Diet Not Asked     Back Care Not Asked     Exercise Yes     Comment: not regular cardio, does yoga once per week - encouraged regular walking program      Bike Helmet Not Asked     Seat Belt Yes     Comment: always      Self-Exams Yes     Comment: SBE encouraged monthly     Parent/sibling w/ CABG, MI or angioplasty before 65F 55M? Yes     Comment: Dad @ age 55   Social History Narrative    Son, Tino, graduating from college, [employer] - finishing in 2018 - getting a teaching degree.     Daughter, Brittany, got  to Ed 2017.   -new last name  Arun - turned age 24 summer of 2018.      Social Determinants of Health     Financial Resource Strain: Not on file   Food Insecurity: Not on file   Transportation Needs: Not on file   Physical Activity: Not on file   Stress: Not on file   Social Connections: Not on file   Intimate Partner Violence: Not on file   Housing Stability: Not on file       Outpatient Encounter Medications as of 2022   Medication Sig Dispense Refill     Ascorbic Acid (VITAMIN C PO)         B Complex Vitamins (VITAMIN B COMPLEX PO)        MAGNESIUM OXIDE PO        Multiple Vitamins-Minerals (ZINC PO)        Omega-3 Fatty Acids (FISH OIL PO)        VITAMIN D PO        No facility-administered encounter medications on file as of 9/1/2022.             O:   NAD, WDWN, Alert & Oriented, Mood & Affect wnl, Vitals stable   Here today alone   LMP 08/20/2018   Breastfeeding No    General appearance normal   Vitals stable   Alert, oriented and in no acute distress     R vertex 1.1cm nodule      Eyes: Conjunctivae/lids:Normal     ENT: Lips, buccal mucosa, tongue: normal    MSK:Normal    Cardiovascular: peripheral edema none    Pulm: Breathing Normal    Neuro/Psych: Orientation:Alert and Orientedx3 ; Mood/Affect:normal       MICRO:   R vertex:dermal cyst lined by stratified squamous epithelium demonstrating tricholemmal keratinization with abrupt change into eosinophilic staining keratin within the lumen.     A/P:  1. Pilar cyst  Scar and recurrence discussed with patient   EXCISION OF CYST AND INT: After thorough discussion of PGACAC, consent obtained, anesthesia and prep, the margins of the cyst were identified and an incision was made encompassing the cyst. The incisions were made through the skin and down to and including the subcutaneous tissue. The cyst was removed en bloc and submitted for frozen pathologic review. hemostasis was achieved. The wound edges were then closed in a layered fashion with 4 vicryl and staples, being careful not to leave any dead space. Postoperative length was 1.1 cm.   EBL minimal; complications none; wound care routine. The patient was discharged in good condition and will return in one week for wound evaluation.  It was a pleasure speaking to Lucinda B Goltz today.        Again, thank you for allowing me to participate in the care of your patient.        Sincerely,        Michael Mcclure MD

## 2022-09-01 NOTE — PATIENT INSTRUCTIONS
[facility]   [phone]    [facility] [phone]      Stapled Wound Care      No strenuous activity for 48 hours. Resume moderate activity in 48 hours. No heavy exercising until you are seen for follow up in one week.    Take Tylenol as needed for discomfort.                                        Do not drink alcoholic beverages for 48 hours.    Keep the pressure bandage in place for 24 hours. If the bandage becomes blood tinged or loose, reinforce it with gauze and tape.   Remove bandage in 24 hours and begin wound care as follows:     Rinse the stapled area with tap water. (shower / bathe / shampoo normally)  Dry wound with Q tip or gauze pad  Apply Polysporin or Bacitracin Ointment to the staples.  Do NOT use Neosporin Ointment *  Cover the wound with a bandaid or nonstick gauze pad and paper tape.  Repeat wound care once a day until staples are removed.                    Bleeding  Hold pressure for 30 minutes with gauze or a cloth over the wound.  If bleeding continues, hold pressure again for another 30 minutes.  If you can't get bleeding to stop after an hour, please call Dr. Mcclure or go to the ER.     In case of emergency call: [phone]

## 2022-09-09 ENCOUNTER — ALLIED HEALTH/NURSE VISIT (OUTPATIENT)
Dept: DERMATOLOGY | Facility: CLINIC | Age: 52
End: 2022-09-09
Payer: COMMERCIAL

## 2022-09-09 DIAGNOSIS — Z48.02 ENCOUNTER FOR STAPLE REMOVAL: Primary | ICD-10-CM

## 2022-09-09 PROCEDURE — 99207 PR NO CHARGE NURSE ONLY: CPT

## 2022-09-09 NOTE — PROGRESS NOTES
Lucinda B Goltz comes into clinic today at the request of Dr. Mcclure Ordering Provider for Staple Removal:  Incision was dry, clean and intact, incision cleansed with wound cleanser and staples were removed. Pt tolerated the procedure.     This service provided today was under the supervising provider of the vince Parra PA-C, who was available if needed.    Roseanna HANSON RN  Dermatology   [phone]

## 2022-09-13 ASSESSMENT — ENCOUNTER SYMPTOMS
CHILLS: 0
CONSTIPATION: 0
ABDOMINAL PAIN: 0
DIARRHEA: 0
FREQUENCY: 1
HEMATURIA: 0
COUGH: 0
SORE THROAT: 0
PALPITATIONS: 0
BREAST MASS: 0
DYSURIA: 0
DIZZINESS: 0
SHORTNESS OF BREATH: 0
MYALGIAS: 0
FEVER: 0
JOINT SWELLING: 0
NERVOUS/ANXIOUS: 0
PARESTHESIAS: 0
HEMATOCHEZIA: 0
EYE PAIN: 0
NAUSEA: 0
HEADACHES: 0
HEARTBURN: 0
WEAKNESS: 0
ARTHRALGIAS: 0

## 2022-09-14 ENCOUNTER — OFFICE VISIT (OUTPATIENT)
Dept: FAMILY MEDICINE | Facility: CLINIC | Age: 52
End: 2022-09-14
Payer: COMMERCIAL

## 2022-09-14 VITALS
HEART RATE: 93 BPM | SYSTOLIC BLOOD PRESSURE: 102 MMHG | DIASTOLIC BLOOD PRESSURE: 70 MMHG | OXYGEN SATURATION: 97 % | HEIGHT: 70 IN | TEMPERATURE: 97.4 F | WEIGHT: 273 LBS | BODY MASS INDEX: 39.08 KG/M2

## 2022-09-14 DIAGNOSIS — Z13.6 CARDIOVASCULAR SCREENING; LDL GOAL LESS THAN 160: ICD-10-CM

## 2022-09-14 DIAGNOSIS — E87.0 HYPERNATREMIA: ICD-10-CM

## 2022-09-14 DIAGNOSIS — E66.01 MORBID OBESITY DUE TO EXCESS CALORIES (H): ICD-10-CM

## 2022-09-14 DIAGNOSIS — Z00.00 ROUTINE GENERAL MEDICAL EXAMINATION AT A HEALTH CARE FACILITY: Primary | ICD-10-CM

## 2022-09-14 DIAGNOSIS — E87.8 HYPERCHLOREMIA: ICD-10-CM

## 2022-09-14 DIAGNOSIS — D12.6 TUBULAR ADENOMA OF COLON: ICD-10-CM

## 2022-09-14 DIAGNOSIS — Z78.0 POSTMENOPAUSAL: ICD-10-CM

## 2022-09-14 DIAGNOSIS — Z28.20 VACCINE REFUSED BY PATIENT: ICD-10-CM

## 2022-09-14 DIAGNOSIS — Z78.0 ASYMPTOMATIC MENOPAUSAL STATE: ICD-10-CM

## 2022-09-14 LAB
ALBUMIN SERPL-MCNC: 3.4 G/DL (ref 3.4–5)
ALP SERPL-CCNC: 114 U/L (ref 40–150)
ALT SERPL W P-5'-P-CCNC: 34 U/L (ref 0–50)
ANION GAP SERPL CALCULATED.3IONS-SCNC: 7 MMOL/L (ref 3–14)
AST SERPL W P-5'-P-CCNC: 22 U/L (ref 0–45)
BILIRUB SERPL-MCNC: 0.5 MG/DL (ref 0.2–1.3)
BUN SERPL-MCNC: 15 MG/DL (ref 7–30)
CALCIUM SERPL-MCNC: 8.6 MG/DL (ref 8.5–10.1)
CHLORIDE BLD-SCNC: 110 MMOL/L (ref 94–109)
CHOLEST SERPL-MCNC: 139 MG/DL
CO2 SERPL-SCNC: 24 MMOL/L (ref 20–32)
CREAT SERPL-MCNC: 0.62 MG/DL (ref 0.52–1.04)
ERYTHROCYTE [DISTWIDTH] IN BLOOD BY AUTOMATED COUNT: 12.6 % (ref 10–15)
FASTING STATUS PATIENT QL REPORTED: YES
GFR SERPL CREATININE-BSD FRML MDRD: >90 ML/MIN/1.73M2
GLUCOSE BLD-MCNC: 88 MG/DL (ref 70–99)
HBA1C MFR BLD: 5.6 % (ref 0–5.6)
HCT VFR BLD AUTO: 40.9 % (ref 35–47)
HDLC SERPL-MCNC: 56 MG/DL
HGB BLD-MCNC: 13.6 G/DL (ref 11.7–15.7)
LDLC SERPL CALC-MCNC: 70 MG/DL
MCH RBC QN AUTO: 30.7 PG (ref 26.5–33)
MCHC RBC AUTO-ENTMCNC: 33.3 G/DL (ref 31.5–36.5)
MCV RBC AUTO: 92 FL (ref 78–100)
NONHDLC SERPL-MCNC: 83 MG/DL
PLATELET # BLD AUTO: 251 10E3/UL (ref 150–450)
POTASSIUM BLD-SCNC: 4 MMOL/L (ref 3.4–5.3)
PROT SERPL-MCNC: 6.9 G/DL (ref 6.8–8.8)
RBC # BLD AUTO: 4.43 10E6/UL (ref 3.8–5.2)
SODIUM SERPL-SCNC: 141 MMOL/L (ref 133–144)
TRIGL SERPL-MCNC: 64 MG/DL
TSH SERPL DL<=0.005 MIU/L-ACNC: 2.82 MU/L (ref 0.4–4)
WBC # BLD AUTO: 5.4 10E3/UL (ref 4–11)

## 2022-09-14 PROCEDURE — 36415 COLL VENOUS BLD VENIPUNCTURE: CPT | Performed by: FAMILY MEDICINE

## 2022-09-14 PROCEDURE — 80053 COMPREHEN METABOLIC PANEL: CPT | Performed by: FAMILY MEDICINE

## 2022-09-14 PROCEDURE — 85027 COMPLETE CBC AUTOMATED: CPT | Performed by: FAMILY MEDICINE

## 2022-09-14 PROCEDURE — 84443 ASSAY THYROID STIM HORMONE: CPT | Performed by: FAMILY MEDICINE

## 2022-09-14 PROCEDURE — 99396 PREV VISIT EST AGE 40-64: CPT | Performed by: FAMILY MEDICINE

## 2022-09-14 PROCEDURE — 80061 LIPID PANEL: CPT | Performed by: FAMILY MEDICINE

## 2022-09-14 PROCEDURE — 83036 HEMOGLOBIN GLYCOSYLATED A1C: CPT | Performed by: FAMILY MEDICINE

## 2022-09-14 ASSESSMENT — ENCOUNTER SYMPTOMS
JOINT SWELLING: 0
HEARTBURN: 0
EYE PAIN: 0
CONSTIPATION: 0
FREQUENCY: 1
WEAKNESS: 0
FEVER: 0
PALPITATIONS: 0
DYSURIA: 0
CHILLS: 0
HEADACHES: 0
SORE THROAT: 0
PARESTHESIAS: 0
HEMATURIA: 0
NERVOUS/ANXIOUS: 0
DIZZINESS: 0
ARTHRALGIAS: 0
ABDOMINAL PAIN: 0
DIARRHEA: 0
SHORTNESS OF BREATH: 0
MYALGIAS: 0
COUGH: 0
BREAST MASS: 0
NAUSEA: 0
HEMATOCHEZIA: 0

## 2022-09-14 NOTE — PROGRESS NOTES
SUBJECTIVE:   CC: Karyna is an 52 year old who presents for preventive health visit and the following other medical problems:      1. Routine general medical examination at a health care facility    2. CARDIOVASCULAR SCREENING; LDL GOAL LESS THAN 160    3. Vaccine refused by patient - flu, shingrix , covid-19 vaccine     4. Hypernatremia - ? from fasting     5. Tubular adenomas of colon - 2 in 2019 - repeat colonoscopy in  per [place] [facility] recommendation     6. Morbid obesity due to excess calories (H)    7. Hyperchloremia - ? from fasting     8. Postmenopausal - last menses 10/2018    9. Asymptomatic menopausal state            Patient has been advised of split billing requirements and indicates understanding: Yes  Healthy Habits:     Getting at least 3 servings of Calcium per day:  NO    Bi-annual eye exam:  Yes    Dental care twice a year:  Yes    Sleep apnea or symptoms of sleep apnea:  None    Diet:  Regular (no restrictions)    Frequency of exercise:  2-3 days/week    Duration of exercise:  15-30 minutes    Taking medications regularly:  Yes    Medication side effects:  Not applicable    PHQ-2 Total Score: 0    Additional concerns today:  No      Today's PHQ-2 Score:   PHQ-2 (  [employer]) 2022   Q1: Little interest or pleasure in doing things 0   Q2: Feeling down, depressed or hopeless 0   PHQ-2 Score 0   PHQ-2 Total Score (12-17 Years)- Positive if 3 or more points; Administer PHQ-A if positive -   Q1: Little interest or pleasure in doing things Not at all   Q2: Feeling down, depressed or hopeless Not at all   PHQ-2 Score 0       Abuse: Current or Past (Physical, Sexual or Emotional) - No  Do you feel safe in your environment? Yes        Social History     Tobacco Use     Smoking status: Former Smoker     Packs/day: 0.50     Years: 4.00     Pack years: 2.00     Types: Cigarettes     Quit date: 11/15/1991     Years since quittin.8     Smokeless tobacco: Never Used   Substance Use Topics     Alcohol  use: Yes     Comment: rare         Alcohol Use 9/13/2022   Prescreen: >3 drinks/day or >7 drinks/week? No   Prescreen: >3 drinks/day or >7 drinks/week? -       Reviewed orders with patient.  Reviewed health maintenance and updated orders accordingly - Yes  Lab work is in process  Labs reviewed in EPIC  BP Readings from Last 3 Encounters:   09/14/22 102/70   05/18/22 112/76   02/22/21 100/70    Wt Readings from Last 3 Encounters:   09/14/22 123.8 kg (273 lb)   02/22/21 108.4 kg (239 lb)   12/18/19 94.3 kg (208 lb)                  Patient Active Problem List   Diagnosis     Morbid obesity due to excess calories (H)     CARDIOVASCULAR SCREENING; LDL GOAL LESS THAN 160     Uterine leiomyoma, unspecified location     Premenstrual dysphoria     Lump of thigh- lipomatous - nothing new      Localized swelling, mass, or lump of upper extremity     DDD (degenerative disc disease), lumbar     DONALD (generalized anxiety disorder)     Dense breast tissue     Family history of diabetes mellitus- father, and multiple family members on paternal side- PGM & PGF      Tubular adenomas of colon - 2 in 6/2019 - [place] colonoscopy in [place]24 per [place] [facility] recommendation      Postmenopausal - last menses 10/2018     Right sided abdominal pain- ? related to constipation vs. high fiber diet - consider magnesium &/or Linzess per [place] [facility]      Postmenopausal symptoms- mild night sweats , mild hot flashes during day - no periods since 2018      Hypernatremia - ? from fasting      Hyperchloremia - ? from fasting      Vaccine refused by patient - flu, shingrix , covid-19 vaccine      Past Surgical History:   Procedure Laterality Date     COLONOSCOPY N/A 11/2/2018    Procedure: COLONOSCOPY;  Surgeon: Arnaldo Mckeon MD;  Location:  GI     COLONOSCOPY  06/25/2019    5 polyps - 2 tubular adenomas - repeat in 2024      EXCISE MASS HEAD  3/30/2012    Procedure:EXCISE MASS HEAD; Excision of Scalp Cyst; Surgeon:HAN GONZALEZ; Location: OR     [facility]   DELIVERY ONLY      , Low Cervical x 2        Social History     Tobacco Use     Smoking status: Former Smoker     Packs/day: 0.50     Years: 4.00     Pack years: 2.00     Types: Cigarettes     Quit date: 11/15/1991     Years since quittin.8     Smokeless tobacco: Never Used   Substance Use Topics     Alcohol use: Yes     Comment: rare     Family History   Problem Relation Age of Onset     Heart Disease Father         MI 55     Diabetes Father         Type II     Psychotic Disorder Mother         unspecified psychotic disorder - at [facility] [place] [facility]      Heart Disease Maternal Grandmother         CHF     Cerebrovascular Disease Paternal Grandmother      Diabetes Paternal Grandmother         Type II     Cancer - colorectal Maternal Cousin 42        in her early 40's      Diabetes Paternal Grandfather         Type II     Asthma Sister      Breast Cancer No family hx of      Ovarian Cancer No family hx of          Current Outpatient Medications   Medication Sig Dispense Refill     Ascorbic Acid (VITAMIN C PO)        B Complex Vitamins (VITAMIN B COMPLEX PO)        MAGNESIUM OXIDE PO        Multiple Vitamins-Minerals (ZINC PO)        Omega-3 Fatty Acids (FISH OIL PO)        VITAMIN D PO        Allergies   Allergen Reactions     Contrast Dye      Erythromycin Nausea and Vomiting     sensitivity      Recent Labs   Lab Test 21  0720 19  0905 19  1617 10/08/18  0949 10/08/18  0948   A1C  --  5.2  --  5.5  --    LDL 54 71  --   --  59   HDL 59 71  --   --  42*   TRIG 43 53  --   --  58   ALT 23 23 37  --  23   CR 0.67 0.66 0.65  --  0.71   GFRESTIMATED >90 >90 >90  --  88   GFRESTBLACK >90 >90 >90  --  >90   POTASSIUM 3.7 4.0 4.3  --  4.2   TSH 2.26 2.63  --   --  2.03        Breast Cancer Screening:    Breast CA Risk Assessment (FHS-7) 2021   Do you have a family history of breast, colon, or ovarian cancer? Yes No / Unknown         Mammogram  "Screening: Recommended annual mammography  Pertinent mammograms are reviewed under the imaging tab.    History of abnormal Pap smear: NO - age 30- 65 PAP every 3 years recommended  PAP / HPV Latest Ref Rng & Units 2/22/2021 10/8/2018 4/27/2015   PAP (Historical) - NIL NIL NIL   HPV16 NEG:Negative Negative Negative Negative   HPV18 NEG:Negative Negative Negative Negative   HRHPV NEG:Negative Negative Negative Negative     Reviewed and updated as needed this visit by clinical staff                    Reviewed and updated as needed this visit by Provider                     Review of Systems   Constitutional: Negative for chills and fever.   HENT: Negative for congestion, ear pain, hearing loss and sore throat.    Eyes: Negative for pain and visual disturbance.   Respiratory: Negative for cough and shortness of breath.    Cardiovascular: Negative for chest pain, palpitations and peripheral edema.   Gastrointestinal: Negative for abdominal pain, constipation, diarrhea, heartburn, hematochezia and nausea.   Breasts:  Negative for tenderness, breast mass and discharge.   Genitourinary: Positive for frequency and urgency. Negative for dysuria, genital sores, hematuria, pelvic pain, vaginal bleeding and vaginal discharge.   Musculoskeletal: Negative for arthralgias, joint swelling and myalgias.   Skin: Negative for rash.   Neurological: Negative for dizziness, weakness, headaches and paresthesias.   Psychiatric/Behavioral: Negative for mood changes. The patient is not nervous/anxious.         OBJECTIVE:   /70   Pulse 93   Temp 97.4  F (36.3  C)   Ht 1.778 m (5' 10\")   Wt 123.8 kg (273 lb)   LMP 08/20/2018   SpO2 97%   BMI 39.17 kg/m    Physical Exam  GENERAL APPEARANCE: healthy, alert and no distress  EYES: Eyes grossly normal to inspection, PERRL and conjunctivae and sclerae normal  HENT: ear canals and TM's normal, nose and mouth without ulcers or lesions, oropharynx clear and oral mucous membranes " moist  NECK: no adenopathy, no asymmetry, masses, or scars and thyroid normal to palpation  RESP: lungs clear to auscultation - no rales, rhonchi or wheezes  BREAST: normal without masses, tenderness or nipple discharge and no palpable axillary masses or adenopathy  CV: regular rate and rhythm, normal S1 S2, no S3 or S4, no murmur, click or rub, no peripheral edema and peripheral pulses strong  ABDOMEN: soft, nontender, no hepatosplenomegaly, no masses and bowel sounds normal  MS: no musculoskeletal defects are noted and gait is age appropriate without ataxia  SKIN: no suspicious lesions or rashes  NEURO: Normal strength and tone, sensory exam grossly normal, mentation intact and speech normal  PSYCH: mentation appears normal and affect normal/bright    Diagnostic Test Results:  Labs reviewed in Epic    ASSESSMENT/PLAN:       ICD-10-CM    1. Routine general medical examination at a health care facility  Z00.00    2. CARDIOVASCULAR SCREENING; LDL GOAL LESS THAN 160  Z13.6 REVIEW OF HEALTH MAINTENANCE PROTOCOL ORDERS     CBC with platelets     Comprehensive metabolic panel     Lipid panel reflex to direct LDL Fasting     TSH with free T4 reflex   3. Vaccine refused by patient - flu, shingrix , covid-19 vaccine   Z28.20    4. Hypernatremia - ? from fasting   E87.0 Comprehensive metabolic panel   5. Tubular adenomas of colon - 2 in 6/2019 - [place] colon[place] in [place]24 per [place] [facility] recommendation   D12.6    6. Morbid obesity due to excess calories (H)  E66.01    7. Hyperchloremia - ? from fasting   E87.8 Comprehensive metabolic panel   8. Postmenopausal - last menses 10/2018  Z78.0    9. Asymptomatic menopausal state  Z78.0 DX Hip/Pelvis/Spine       Patient has been advised of split billing requirements and indicates understanding: Yes    COUNSELING:  Reviewed preventive health counseling, as reflected in patient instructions    Estimated body mass index is 34.29 kg/m  as calculated from the following:    Height as of  "2/22/21: 1.778 m (5' 10\").    Weight as of 2/22/21: 108.4 kg (239 lb).    Weight management plan: Discussed healthy diet and exercise guidelines    She reports that she quit smoking about 30 years ago. Her smoking use included cigarettes. She has a 2.00 pack-year smoking history. She has never used smokeless tobacco.      Counseling Resources:  ATP IV Guidelines  Pooled Cohorts Equation Calculator  Breast Cancer Risk Calculator  BRCA-Related Cancer Risk Assessment: FHS-7 Tool  FRAX Risk Assessment  ICSI Preventive Guidelines  Dietary Guidelines for Americans, 2010  USDA's MyPlate  ASA Prophylaxis  Lung CA Screening          Roxann Valdovinos MD  [facility]  "

## 2022-09-14 NOTE — PATIENT INSTRUCTIONS
Preventive Health Recommendations  Female Ages 50 - 64    Yearly exam: See your health care provider every year in order to  Review health changes.   Discuss preventive care.    Review your medicines if your doctor has prescribed any.    Get a Pap test every three years (unless you have an abnormal result and your provider advises testing more often).  If you get Pap tests with HPV test, you only need to test every 5 years, unless you have an abnormal result.   You do not need a Pap test if your uterus was removed (hysterectomy) and you have not had cancer.  You should be tested each year for STDs (sexually transmitted diseases) if you're at risk.   Have a mammogram every 1 to 2 years.  Have a colonoscopy at age 50, or have a yearly FIT test (stool test). These exams screen for colon cancer.    Have a cholesterol test every 5 years, or more often if advised.  Have a diabetes test (fasting glucose) every three years. If you are at risk for diabetes, you should have this test more often.   If you are at risk for osteoporosis (brittle bone disease), think about having a bone density scan (DEXA).    Shots: Get a flu shot each year. Get a tetanus shot every 10 years.    Nutrition:   Eat at least 5 servings of fruits and vegetables each day.  Eat whole-grain bread, whole-wheat pasta and brown rice instead of white grains and rice.  Get adequate Calcium and Vitamin D.     Lifestyle  Exercise at least 150 minutes a week (30 minutes a day, 5 days a week). This will help you control your weight and prevent disease.  Limit alcohol to one drink per day.  No smoking.   Wear sunscreen to prevent skin cancer.   See your dentist every six months for an exam and cleaning.  See your eye doctor every 1 to 2 years.    Thank you so much or choosing [facility]  for your Health Care. It was a pleasure seeing you at your visit today! Please contact us with any questions or concerns you may have.                    Roxann Valdovinos MD                              To reach your [facility] care team after hours call:   [phone]    PLEASE NOTE OUR HOURS HAVE CHANGED secondary to COVID-19 coronavirus pandemic, as we are trying to minimize patient exposure to the virus,  which is now widespread in the [facility].  These hours may change with very little notice.  We apologize for any inconvenience.       Our current clinic hours are:          Monday- Thursday   7:00am - 6:00pm  in person.      Friday  7:00am- 5:00pm                       Saturday and Sunday : Closed to in person and virtual visits        We have telephone and virtual visit times available between    7:00am - 6pm on Monday-Friday as well.                                                Phone:  [phone]      Our pharmacy hours: Monday through Friday 8:00am to 5:00pm                        Saturday - 9:00 am to 12 noon       Sunday : Closed.              Phone:  [phone]              ###  Please note: at this time we are not accepting any walk-in visits. ###      There is also information available at our web site:  [email or web address]    If your provider ordered any lab tests and you do not receive the results within 10 business days, please call the clinic.    If you need a medication refill please contact your pharmacy.  Please allow 3 business days for your refill to be completed.    Our clinic offers telephone visits and e visits.  Please ask one of your team members to explain more.      Use [email or web address] (secure email communication and access to your chart) to send your primary care provider a message or make an appointment. Ask someone on your Team how to sign up for [employer].

## 2022-11-17 ENCOUNTER — ANCILLARY PROCEDURE (OUTPATIENT)
Dept: BONE DENSITY | Facility: CLINIC | Age: 52
End: 2022-11-17
Attending: FAMILY MEDICINE
Payer: COMMERCIAL

## 2022-11-17 DIAGNOSIS — Z78.0 ASYMPTOMATIC MENOPAUSAL STATE: ICD-10-CM

## 2022-11-17 PROCEDURE — 77080 DXA BONE DENSITY AXIAL: CPT | Performed by: INTERNAL MEDICINE

## 2023-07-08 ENCOUNTER — HEALTH MAINTENANCE LETTER (OUTPATIENT)
Age: 53
End: 2023-07-08

## 2023-11-06 ENCOUNTER — PATIENT OUTREACH (OUTPATIENT)
Dept: GASTROENTEROLOGY | Facility: CLINIC | Age: 53
End: 2023-11-06
Payer: COMMERCIAL

## 2023-11-25 ENCOUNTER — HEALTH MAINTENANCE LETTER (OUTPATIENT)
Age: 53
End: 2023-11-25

## 2024-01-10 ASSESSMENT — ENCOUNTER SYMPTOMS
MYALGIAS: 0
SHORTNESS OF BREATH: 0
ABDOMINAL PAIN: 0
FEVER: 0
FREQUENCY: 0
PARESTHESIAS: 0
DIZZINESS: 0
BREAST MASS: 0
DYSURIA: 0
NERVOUS/ANXIOUS: 0
ARTHRALGIAS: 0
WEAKNESS: 0
CONSTIPATION: 0
HEARTBURN: 0
HEMATURIA: 0
CHILLS: 0
JOINT SWELLING: 0
COUGH: 0
HEMATOCHEZIA: 0
SORE THROAT: 0
DIARRHEA: 0
NAUSEA: 0
PALPITATIONS: 0
EYE PAIN: 0
HEADACHES: 0

## 2024-01-12 ENCOUNTER — HOSPITAL ENCOUNTER (OUTPATIENT)
Dept: MAMMOGRAPHY | Facility: CLINIC | Age: 54
Discharge: HOME | End: 2024-01-12
Attending: FAMILY MEDICINE | Admitting: FAMILY MEDICINE
Payer: COMMERCIAL

## 2024-01-12 ENCOUNTER — LAB (OUTPATIENT)
Dept: LAB | Facility: CLINIC | Age: 54
End: 2024-01-12
Payer: COMMERCIAL

## 2024-01-12 ENCOUNTER — OFFICE VISIT (OUTPATIENT)
Dept: FAMILY MEDICINE | Facility: CLINIC | Age: 54
End: 2024-01-12
Payer: COMMERCIAL

## 2024-01-12 VITALS
RESPIRATION RATE: 16 BRPM | OXYGEN SATURATION: 98 % | BODY MASS INDEX: 39.17 KG/M2 | DIASTOLIC BLOOD PRESSURE: 68 MMHG | SYSTOLIC BLOOD PRESSURE: 110 MMHG | TEMPERATURE: 98.1 F | HEIGHT: 70 IN | HEART RATE: 116 BPM

## 2024-01-12 DIAGNOSIS — E66.01 MORBID OBESITY (H): ICD-10-CM

## 2024-01-12 DIAGNOSIS — Z00.01 ENCOUNTER FOR ROUTINE ADULT MEDICAL EXAM WITH ABNORMAL FINDINGS: Primary | ICD-10-CM

## 2024-01-12 DIAGNOSIS — D12.6 TUBULAR ADENOMA OF COLON: ICD-10-CM

## 2024-01-12 DIAGNOSIS — N39.41 URGE INCONTINENCE OF URINE: ICD-10-CM

## 2024-01-12 DIAGNOSIS — Z13.6 CARDIOVASCULAR SCREENING; LDL GOAL LESS THAN 130: ICD-10-CM

## 2024-01-12 DIAGNOSIS — Z12.11 SCREEN FOR COLON CANCER: ICD-10-CM

## 2024-01-12 DIAGNOSIS — R73.09 ELEVATED HEMOGLOBIN A1C: ICD-10-CM

## 2024-01-12 DIAGNOSIS — R39.15 URINARY URGENCY: ICD-10-CM

## 2024-01-12 DIAGNOSIS — R73.03 PREDIABETES: ICD-10-CM

## 2024-01-12 DIAGNOSIS — R63.5 WEIGHT GAIN: ICD-10-CM

## 2024-01-12 DIAGNOSIS — Z12.31 VISIT FOR SCREENING MAMMOGRAM: ICD-10-CM

## 2024-01-12 DIAGNOSIS — E87.8 HYPERCHLOREMIA: ICD-10-CM

## 2024-01-12 DIAGNOSIS — Z13.6 CARDIOVASCULAR SCREENING; LDL GOAL LESS THAN 160: ICD-10-CM

## 2024-01-12 DIAGNOSIS — E87.0 HYPERNATREMIA: ICD-10-CM

## 2024-01-12 LAB
ALBUMIN UR-MCNC: NEGATIVE MG/DL
APPEARANCE UR: CLEAR
BASOPHILS # BLD AUTO: 0 10E3/UL (ref 0–0.2)
BASOPHILS NFR BLD AUTO: 1 %
BILIRUB UR QL STRIP: NEGATIVE
COLOR UR AUTO: YELLOW
EOSINOPHIL # BLD AUTO: 0.2 10E3/UL (ref 0–0.7)
EOSINOPHIL NFR BLD AUTO: 3 %
ERYTHROCYTE [DISTWIDTH] IN BLOOD BY AUTOMATED COUNT: 13.3 % (ref 10–15)
GLUCOSE UR STRIP-MCNC: NEGATIVE MG/DL
HBA1C MFR BLD: 5.8 % (ref 0–5.6)
HCT VFR BLD AUTO: 41 % (ref 35–47)
HGB BLD-MCNC: 13.4 G/DL (ref 11.7–15.7)
HGB UR QL STRIP: NEGATIVE
IMM GRANULOCYTES # BLD: 0 10E3/UL
IMM GRANULOCYTES NFR BLD: 0 %
KETONES UR STRIP-MCNC: NEGATIVE MG/DL
LEUKOCYTE ESTERASE UR QL STRIP: NEGATIVE
LYMPHOCYTES # BLD AUTO: 2.1 10E3/UL (ref 0.8–5.3)
LYMPHOCYTES NFR BLD AUTO: 35 %
MCH RBC QN AUTO: 30.5 PG (ref 26.5–33)
MCHC RBC AUTO-ENTMCNC: 32.7 G/DL (ref 31.5–36.5)
MCV RBC AUTO: 93 FL (ref 78–100)
MONOCYTES # BLD AUTO: 0.5 10E3/UL (ref 0–1.3)
MONOCYTES NFR BLD AUTO: 9 %
NEUTROPHILS # BLD AUTO: 3.1 10E3/UL (ref 1.6–8.3)
NEUTROPHILS NFR BLD AUTO: 53 %
NITRATE UR QL: NEGATIVE
PH UR STRIP: 5.5 [PH] (ref 5–7)
PLATELET # BLD AUTO: 255 10E3/UL (ref 150–450)
RBC # BLD AUTO: 4.39 10E6/UL (ref 3.8–5.2)
SP GR UR STRIP: >=1.03 (ref 1–1.03)
UROBILINOGEN UR STRIP-ACNC: 0.2 E.U./DL
WBC # BLD AUTO: 5.9 10E3/UL (ref 4–11)

## 2024-01-12 PROCEDURE — 77063 BREAST TOMOSYNTHESIS BI: CPT

## 2024-01-12 PROCEDURE — 84443 ASSAY THYROID STIM HORMONE: CPT

## 2024-01-12 PROCEDURE — 80061 LIPID PANEL: CPT

## 2024-01-12 PROCEDURE — 99396 PREV VISIT EST AGE 40-64: CPT | Mod: 25 | Performed by: FAMILY MEDICINE

## 2024-01-12 PROCEDURE — 81003 URINALYSIS AUTO W/O SCOPE: CPT | Performed by: FAMILY MEDICINE

## 2024-01-12 PROCEDURE — 83036 HEMOGLOBIN GLYCOSYLATED A1C: CPT

## 2024-01-12 PROCEDURE — 36415 COLL VENOUS BLD VENIPUNCTURE: CPT

## 2024-01-12 PROCEDURE — 80053 COMPREHEN METABOLIC PANEL: CPT

## 2024-01-12 PROCEDURE — 99214 OFFICE O/P EST MOD 30 MIN: CPT | Mod: 25 | Performed by: FAMILY MEDICINE

## 2024-01-12 PROCEDURE — 85025 COMPLETE CBC W/AUTO DIFF WBC: CPT

## 2024-01-12 RX ORDER — OXYBUTYNIN CHLORIDE 10 MG/1
10 TABLET, EXTENDED RELEASE ORAL DAILY
Qty: 90 TABLET | Refills: 3 | Status: SHIPPED | OUTPATIENT
Start: 2024-01-12

## 2024-01-12 ASSESSMENT — ENCOUNTER SYMPTOMS
FREQUENCY: 0
BREAST MASS: 0
HEMATOCHEZIA: 0
FEVER: 0
ARTHRALGIAS: 0
DYSURIA: 0
PARESTHESIAS: 0
CHILLS: 0
JOINT SWELLING: 0
DIZZINESS: 0
COUGH: 0
MYALGIAS: 0
PALPITATIONS: 0
SORE THROAT: 0
HEMATURIA: 0
NAUSEA: 0
WEAKNESS: 0
SHORTNESS OF BREATH: 0
HEARTBURN: 0
EYE PAIN: 0
ABDOMINAL PAIN: 0
DIARRHEA: 0
HEADACHES: 0
NERVOUS/ANXIOUS: 0
CONSTIPATION: 0

## 2024-01-12 NOTE — PATIENT INSTRUCTIONS
[facility]  [address] [place] [id number]  Office: [phone]   Fax:    [phone]         For bladder urgency and bladder irritation which can contribute to daytime and/or night-time urgency, accidents or incontinence for children and adults :   Avoid or better yet eliminate citrus juices (orange juice, grapefruit juice, lemonade or limeade), citric acid in things as preservatives,  and /or or vinegars ( acidic substances - even those in salad dressing), all caffeine, even decaf coffee, and teas; alcohol, and artificial sweeteners, red and blue food dyes, sports drinks, spicy foods, chocolate, tomato products, excessive dairy, and carbonated beverages ( even sparkling mcneil).     Even 1 serving of any of the above can irritate/negatively affect the bladder for 3 days.     If you eliminate all the above and are still having problems, please contact our office.             Preventive Health Recommendations  Female Ages 50 - 64    Yearly exam: See your health care provider every year in order to  Review health changes.   Discuss preventive care.    Review your medicines if your doctor has prescribed any.    Get a Pap test every three years (unless you have an abnormal result and your provider advises testing more often).  If you get Pap tests with HPV test, you only need to test every 5 years, unless you have an abnormal result.   You do not need a Pap test if your uterus was removed (hysterectomy) and you have not had cancer.  You should be tested each year for STDs (sexually transmitted diseases) if you're at risk.   Have a mammogram every 1 to 2 years.  Have a colonoscopy at age 45, or have a yearly FIT test (stool test). These exams screen for colon cancer.    Have a cholesterol test every 5 years, or more often if advised.  Have a diabetes test (fasting glucose) every three years. If you are at risk for diabetes, you should have this test more often.   If you are at risk  "for osteoporosis (brittle bone disease), think about having a bone density scan (DEXA).    Shots: Get a flu shot each year. Get a tetanus shot every 10 years.    Nutrition:   Eat at least 5 servings of fruits and vegetables each day.  Eat whole-grain bread, whole-wheat pasta and brown rice instead of white grains and rice.  Get adequate Calcium and Vitamin D.     Lifestyle  Exercise at least 150 minutes a week (30 minutes a day, 5 days a week). This will help you control your weight and prevent disease.  Limit alcohol to one drink per day.  No smoking.   Wear sunscreen to prevent skin cancer.   See your dentist every six months for an exam and cleaning.  See your eye doctor every 1 to 2 years.  Thank you so much or choosing [facility]  for your Health Care. It was a pleasure seeing you at your visit today! Please contact us with any questions or concerns you may have.                   Roxann Valdovinos MD                              To reach your [facility] care team after hours call:   [phone] press #2 \"to speak with your care team\".  This will get you to our clinic instead of routing to central [facility]  scheduling.     PLEASE NOTE OUR HOURS HAVE CHANGED secondary to COVID-19 coronavirus pandemic, as we are trying to minimize patient exposure to the virus,  which is now widespread in the [facility].  These hours may change with very little notice.  We apologize for any inconvenience.       Our current clinic hours are:          Monday- Thursday   7:00am - 6:00pm  in person.      Friday  7:00am- 5:00pm                       Saturday and Sunday : Closed to in person and virtual visits        We have telephone and virtual visit times available between    7:00am - 6pm on Monday-Friday as well.                                                Phone:  [phone]      Our pharmacy hours: Monday through Friday 8:00am to 5:00pm                       "  Saturday - 9:00 am to 12 noon       Sunday : Closed.              Phone:  [phone]              ###  Please note: at this time we are not accepting any walk-in visits. ###      There is also information available at our web site:  [email or web address]    If your provider ordered any lab tests and you do not receive the results within 10 business days, please call the clinic.    If you need a medication refill please contact your pharmacy.  Please allow 3 business days for your refill to be completed.    Our clinic offers telephone visits and e visits.  Please ask one of your team members to explain more.      Use [facility] (secure email communication and access to your chart) to send your primary care provider a message or make an appointment. Ask someone on your Team how to sign up for [employer].

## 2024-01-12 NOTE — PROGRESS NOTES
SUBJECTIVE:   Karyna is a 53 year old, presenting for the followin. Encounter for routine adult medical exam with abnormal findings    2. CARDIOVASCULAR SCREENING; LDL GOAL LESS THAN 130    3. Prediabetes - new dx 2024 - a1C = 5.8    4. Tubular adenomas of colon - 2 in 2019 - repeat colonoscopy in  per [place] [facility] recommendation     5. Screen for colon cancer    6. Morbid obesity (H)    7. Urinary urgency    8. Weight gain    9. Urge incontinence of urine- without stress incontinence        Physical        2024     3:10 PM   Additional Questions   Roomed by Amber CMA   Accompanied by Self       Healthy Habits:     Getting at least 3 servings of Calcium per day:  Yes    Bi-annual eye exam:  Yes    Dental care twice a year:  Yes    Sleep apnea or symptoms of sleep apnea:  None    Diet:  Regular (no restrictions)    Frequency of exercise:  None    Taking medications regularly:  Yes    New diagnosis of Prediabetes  today - discussed -labs today: CBC :  Normal red blood cell (hgb) levels, normal white blood cell count and normal platelet levels.  -A1C (diabetic test) is elevated and a sign of prediabetes.  ADVISE: weight loss, diabetes education, and starting on metformin.      As we discussed today , you would like to wait on the metformin treatment, so we'll focus on nutrition changes and exercise and weight loss right now.     Lab Results   Component Value Date    A1C 5.8 2024    A1C 5.6 2022    A1C 5.2 2019    A1C 5.5 10/08/2018     CBC RESULTS:   Recent Labs   Lab Test 24  1010   WBC 5.9   RBC 4.39   HGB 13.4   HCT 41.0   MCV 93   MCH 30.5   MCHC 32.7   RDW 13.3          Today's PHQ-2 Score:       2024     3:00 PM   PHQ-2 (  [employer])   Q1: Little interest or pleasure in doing things 0   Q2: Feeling down, depressed or hopeless 0   PHQ-2 Score 0   Q1: Little interest or pleasure in doing things Not at all   Q2: Feeling down, depressed or hopeless Not at all   PHQ-2  Score 0         Social History     Tobacco Use    Smoking status: Former     Packs/day: 0.50     Years: 4.00     Additional pack years: 0.00     Total pack years: 2.00     Types: Cigarettes     Quit date: 11/15/1991     Years since quittin.2    Smokeless tobacco: Never   Substance Use Topics    Alcohol use: Yes     Comment: rare         1/10/2024     4:18 PM   Alcohol Use   Prescreen: >3 drinks/day or >7 drinks/week? No          No data to display              Reviewed orders with patient.  Reviewed health maintenance and updated orders accordingly - Yes  Lab work is in process  Labs reviewed in EPIC  BP Readings from Last 3 Encounters:   24 110/68   22 102/70   22 112/76    Wt Readings from Last 3 Encounters:   22 123.8 kg (273 lb)   21 108.4 kg (239 lb)   19 94.3 kg (208 lb)                  Patient Active Problem List   Diagnosis    Morbid obesity due to excess calories (H)    CARDIOVASCULAR SCREENING; LDL GOAL LESS THAN 130    Uterine leiomyoma, unspecified location    Premenstrual dysphoria    Lump of thigh- lipomatous - nothing new     Localized swelling, mass, or lump of upper extremity    DDD (degenerative disc disease), lumbar    DONALD (generalized anxiety disorder)    Dense breast tissue    Family history of diabetes mellitus- father, and multiple family members on paternal side- PGM & PGF     Tubular adenomas of colon - 2 in 2019 - [place] in  per [place] [facility] recommendation     Postmenopausal - last menses 10/2018    Right sided abdominal pain- ? related to constipation vs. high fiber diet - consider magnesium &/or Linzess per [place] [facility]     Postmenopausal symptoms- mild night sweats , mild hot flashes during day - no periods since 2018     Hypernatremia - ? from fasting     Hyperchloremia - ? from fasting     Vaccine refused by patient - flu, shingrix , covid-19 vaccine     Prediabetes - new dx 2024 - a1C = 5.8     Past Surgical History:   Procedure  Laterality Date    COLONOSCOPY N/A 2018    Procedure: COLONOSCOPY;  Surgeon: Arnaldo Mckeon MD;  Location: [facility]    COLONOSCOPY  2019    5 polyps - 2 tubular adenomas - repeat in      EXCISE MASS HEAD  3/30/2012    Procedure:EXCISE MASS HEAD; Excision of Scalp Cyst; Surgeon:HAN GONZALEZ; Location:[facility] OR    ZZC  DELIVERY ONLY      , Low Cervical x 2        Social History     Tobacco Use    Smoking status: Former     Packs/day: 0.50     Years: 4.00     Additional pack years: 0.00     Total pack years: 2.00     Types: Cigarettes     Quit date: 11/15/1991     Years since quittin.2    Smokeless tobacco: Never   Substance Use Topics    Alcohol use: Yes     Comment: rare     Family History   Problem Relation Age of Onset    Psychotic Disorder Mother         unspecified psychotic disorder - at [facility] in [place] [facility]     Heart Disease Father         MI 55    Diabetes Father         Type II    Asthma Sister     Heart Disease Maternal Grandmother         CHF    Cerebrovascular Disease Paternal Grandmother     Diabetes Paternal Grandmother         Type II    Diabetes Paternal Grandfather         Type II    Cancer - colorectal Maternal Cousin 42        in her early 40's     Deep Vein Thrombosis Son     Breast Cancer No family hx of     Ovarian Cancer No family hx of          Current Outpatient Medications   Medication Sig Dispense Refill    Multiple Vitamins-Minerals (ZINC PO)       oxyBUTYnin ER (DITROPAN XL) 10 MG 24 hr tablet Take 1 tablet (10 mg) by mouth daily 90 tablet 3    Omega-3 Fatty Acids (FISH OIL PO)  (Patient not taking: Reported on 2024)      VITAMIN D PO  (Patient not taking: Reported on 2024)       Allergies   Allergen Reactions    Contrast Dye     Erythromycin Nausea and Vomiting     sensitivity      Recent Labs   Lab Test 24  1010 22  0847 21  0720 19  0905   A1C 5.8* 5.6  --  5.2   LDL 80 70 54 71   HDL 54  56 59 71   TRIG 54 64 43 53   ALT 20 34 23 23   CR 0.72 0.62 0.67 0.66   GFRESTIMATED >90 >90 >90 >90   GFRESTBLACK  --   --  >90 >90   POTASSIUM 4.5 4.0 3.7 4.0   TSH 2.35 2.82 2.26 2.63        Breast Cancer Screenin/22/2021     7:47 AM 2022    11:37 AM   Breast CA Risk Assessment (FHS-7)   Do you have a family history of breast, colon, or ovarian cancer? Yes No / Unknown         Mammogram Screening: Recommended annual mammography  Pertinent mammograms are reviewed under the imaging tab.    History of abnormal Pap smear: NO - age 30- 65 PAP every 3 years recommended      Latest Ref Rng & Units 2021     8:04 AM 2021     7:44 AM 10/8/2018     2:35 PM   PAP / HPV   PAP (Historical)   NIL     HPV 16 DNA NEG^Negative Negative   Negative    HPV 18 DNA NEG^Negative Negative   Negative    Other HR HPV NEG^Negative Negative   Negative      Reviewed and updated as needed this visit by clinical staff   Tobacco  Allergies  Meds  Problems  Med Hx  Surg Hx  Fam Hx          Reviewed and updated as needed this visit by Provider   Tobacco  Allergies  Meds  Problems  Med Hx  Surg Hx  Fam Hx          Past Medical History:   Diagnosis Date    Adjustment disorder with depressed mood- mild 2015    Morbid obesity (H)     Postmenopausal - last menses 10/2018     last menses 10/2018 - postmenopausal    Scalp mass       Past Surgical History:   Procedure Laterality Date    COLONOSCOPY N/A 2018    Procedure: COLONOSCOPY;  Surgeon: Arnaldo Mckeon MD;  Location:  GI    COLONOSCOPY  2019    5 polyps - 2 tubular adenomas - repeat in      EXCISE MASS HEAD  3/30/2012    Procedure:EXCISE MASS HEAD; Excision of Scalp Cyst; Surgeon:HAN GONZALEZ; Location:[facility]    ZZC  DELIVERY ONLY      , Low Cervical x 2      OB History    Para Term  AB Living   2 2 2 0 0 2   SAB IAB Ectopic Multiple Live Births   0 0 0 0 0      # Outcome Date GA Lbr   "Weight Sex Delivery Anes PTL Lv   2 Term            1 Term                Review of Systems   Constitutional:  Negative for chills and fever.   HENT:  Negative for congestion, ear pain, hearing loss and sore throat.    Eyes:  Negative for pain and visual disturbance.   Respiratory:  Negative for cough and shortness of breath.    Cardiovascular:  Negative for chest pain, palpitations and peripheral edema.   Gastrointestinal:  Negative for abdominal pain, constipation, diarrhea, heartburn, hematochezia and nausea.   Breasts:  Negative for tenderness, breast mass and discharge.   Genitourinary:  Negative for dysuria, frequency, genital sores, hematuria, pelvic pain, urgency, vaginal bleeding and vaginal discharge.   Musculoskeletal:  Negative for arthralgias, joint swelling and myalgias.   Skin:  Negative for rash.   Neurological:  Negative for dizziness, weakness, headaches and paresthesias.   Psychiatric/Behavioral:  Negative for mood changes. The patient is not nervous/anxious.           OBJECTIVE:   /68   Pulse 116   Temp 98.1  F (36.7  C) (Tympanic)   Resp 16   Ht 1.778 m (5' 10\")   LMP 08/20/2018   SpO2 98%   BMI 39.17 kg/m    Physical Exam  GENERAL APPEARANCE: healthy, alert and no distress  EYES: Eyes grossly normal to inspection, PERRL and conjunctivae and sclerae normal  HENT: ear canals and TM's normal, nose and mouth without ulcers or lesions, oropharynx clear and oral mucous membranes moist  NECK: no adenopathy, no asymmetry, masses, or scars and thyroid normal to palpation  RESP: lungs clear to auscultation - no rales, rhonchi or wheezes  BREAST: normal without masses, tenderness or nipple discharge and no palpable axillary masses or adenopathy  CV: regular rate and rhythm, normal S1 S2, no S3 or S4, no murmur, click or rub, no peripheral edema and peripheral pulses strong  ABDOMEN: soft, nontender, no hepatosplenomegaly, no masses and bowel sounds normal  MS: no musculoskeletal defects are " noted and gait is age appropriate without ataxia  SKIN: no suspicious lesions or rashes  NEURO: Normal strength and tone, sensory exam grossly normal, mentation intact and speech normal  PSYCH: mentation appears normal and affect normal/bright    Diagnostic Test Results:  Labs reviewed in Epic    ASSESSMENT/PLAN:       ICD-10-CM    1. Encounter for routine adult medical exam with abnormal findings  Z00.01       2. CARDIOVASCULAR SCREENING; LDL GOAL LESS THAN 130  Z13.6       3. Prediabetes - new dx 1/12/2024 - a1C = 5.8  R73.03 Adult Diabetes Education  Referral     Adult Comprehensive Weight Management  Referral      4. Tubular adenomas of colon - 2 in 6/2019 - [place] [facility] [place]   D12.6 Colonoscopy Screening  Referral      5. Screen for colon cancer  Z12.11 Colonoscopy Screening  Referral      6. Morbid obesity (H)  E66.01 Adult Diabetes Education  Referral     Adult Comprehensive Weight Management  Referral      7. Urinary urgency  R39.15 UA Macroscopic with reflex to Microscopic and Culture - Lab Collect     UA Macroscopic with reflex to Microscopic and Culture - Lab Collect      8. Weight gain  R63.5       9. Urge incontinence of urine- without stress incontinence  N39.41 oxyBUTYnin ER (DITROPAN XL) 10 MG 24 hr tablet     Physical Therapy Referral          Patient has been advised of split billing requirements and indicates understanding: Yes      COUNSELING:  Reviewed preventive health counseling, as reflected in patient instructions    Return in about 6 months (around 7/12/2024) for prediabetes, recheck weight progress , w/ Dr. WORKMAN for 40 min appt.   Future Appointments 2/11/2024 - 8/9/2024        Date Visit Type Length Department Provider     3/25/2024  2:45 PM PRE-DIABETES NUTRITION 60 min [facility] Harper Dietz RD    Location Instructions:     Your appointment is at [facility]  located in the [facility] at [address] Please take the elevator to the second floor and check in at the  in [address].&nbsp;  **Please note that your appointment is NOT at the [facility].                      She reports that she quit smoking about 32 years ago. Her smoking use included cigarettes. She has a 2 pack-year smoking history. She has never used smokeless tobacco.        Roxann Valdovinos MD  [facility] [place]   98

## 2024-01-13 LAB
ALBUMIN SERPL BCG-MCNC: 4.1 G/DL (ref 3.5–5.2)
ALP SERPL-CCNC: 125 U/L (ref 40–150)
ALT SERPL W P-5'-P-CCNC: 20 U/L (ref 0–50)
ANION GAP SERPL CALCULATED.3IONS-SCNC: 10 MMOL/L (ref 7–15)
AST SERPL W P-5'-P-CCNC: 20 U/L (ref 0–45)
BILIRUB SERPL-MCNC: 0.4 MG/DL
BUN SERPL-MCNC: 12.4 MG/DL (ref 6–20)
CALCIUM SERPL-MCNC: 9.3 MG/DL (ref 8.6–10)
CHLORIDE SERPL-SCNC: 109 MMOL/L (ref 98–107)
CHOLEST SERPL-MCNC: 145 MG/DL
CREAT SERPL-MCNC: 0.72 MG/DL (ref 0.51–0.95)
DEPRECATED HCO3 PLAS-SCNC: 25 MMOL/L (ref 22–29)
EGFRCR SERPLBLD CKD-EPI 2021: >90 ML/MIN/1.73M2
FASTING STATUS PATIENT QL REPORTED: YES
GLUCOSE SERPL-MCNC: 88 MG/DL (ref 70–99)
HDLC SERPL-MCNC: 54 MG/DL
LDLC SERPL CALC-MCNC: 80 MG/DL
NONHDLC SERPL-MCNC: 91 MG/DL
POTASSIUM SERPL-SCNC: 4.5 MMOL/L (ref 3.4–5.3)
PROT SERPL-MCNC: 6.8 G/DL (ref 6.4–8.3)
SODIUM SERPL-SCNC: 144 MMOL/L (ref 135–145)
TRIGL SERPL-MCNC: 54 MG/DL
TSH SERPL DL<=0.005 MIU/L-ACNC: 2.35 UIU/ML (ref 0.3–4.2)

## 2024-01-24 ENCOUNTER — VIRTUAL VISIT (OUTPATIENT)
Dept: EDUCATION SERVICES | Facility: CLINIC | Age: 54
End: 2024-01-24
Attending: FAMILY MEDICINE
Payer: COMMERCIAL

## 2024-01-24 DIAGNOSIS — E66.01 MORBID OBESITY (H): ICD-10-CM

## 2024-01-24 DIAGNOSIS — R73.03 PREDIABETES: ICD-10-CM

## 2024-01-24 PROCEDURE — 97802 MEDICAL NUTRITION INDIV IN: CPT | Mod: 95

## 2024-01-24 NOTE — LETTER
1/24/2024         RE: Lucinda B Goltz  [id number] [address] [id number]        Dear Colleague,    Thank you for referring your patient, Lucinda B Goltz, to the [facility]. Please see a copy of my visit note below.    Medical Nutrition Therapy Self-Management Education & Support  Presents for: Individual review    Type of service:  Video Visit    If the video visit is dropped, the video visit invitation should be resent by: Text to cell phone: [phone]    Originating Location (pt. Location): Other work  Distant Location (provider location): [facility]  Mode of Communication:  Video Conference via [email or web address]    Video Start Time:  1100  Video End Time (time video stopped): 1130    How would patient like to obtain AVS? MyChart    NUTRITION DIAGNOSIS: Food- and nutrition-related knowledge deficit related to new diagnosis of pre-diabetes as evidenced by A1c.    NUTRITION INTERVENTION:  Nutrition Prescription: Carbohydrate Intake: 30-45 grams/meal and 0-30 grams/snacks  Education given to support: general nutrition guidelines, weight reduction, consistent meals, carb counting, labeling, exercise, behavior modification, and portion control  Education Materials Provided: My Plate Planner/Choose My Plate  Motivational Interviewing    MONITOR / EVALUATE:  RD will monitor/evaluate:  Beliefs and attitudes related to food  Blood Glucose / A1c  Food and nutrition knowledge / skills  Food / Beverage / Nutrient intake   Pertinent Labs  Progress toward meeting stated nutrition-related goals  Readiness to change nutrition-related behaviors  Weight change    ASSESSMENT:  Karyna has a good initial understanding of the concepts discussed above.    PLAN:   Meal Plan Recommendation:   Goal: Use portion control and plate planning method at least 50% of the time.  Carbohydrates to aim for at meals: 30-45 grams and snacks: 0-30 grams.  Use diabetesfoodhub.org for recipe  "and meal planning ideas  Use GreenLight anni for carbohydrate reference.    Exercise / activity plan:   Recommend starting slow and increasing as tolerated to goal of 150 minutes per week.     FOLLOW-UP:  Follow-up appointment scheduled in 2 months.      Patient's most recent   Lab Results   Component Value Date    A1C 5.8 01/12/2024    A1C 5.2 12/18/2019         See Care Plan for co-developed, patient-state behavior change goals.  AVS provided for patient today.    SUBJECTIVE/OBJECTIVE:  Presents for: Individual review  Accompanied by: Self  Diabetes education in the past 24mo: No  Focus of Visit: Healthy Coping  Diabetes type:  (Pre-diabetes)  How confident are you filling out medical forms by yourself:: Extremely  Transportation concerns: No  Other concerns:: None  Cultural Influences/Ethnic Background:  Choose not to answer    Patient Problem List and Family Medical History reviewed for relevant medical history, current medical status, and diabetes risk factors.    Vitals:  LMP 08/20/2018   Estimated body mass index is 39.17 kg/m  as calculated from the following:    Height as of 1/12/24: 1.778 m (5' 10\").    Weight as of 9/14/22: 123.8 kg (273 lb).   Last 3 BP:   BP Readings from Last 3 Encounters:   01/12/24 110/68   09/14/22 102/70   05/18/22 112/76       History   Smoking Status     Former     Packs/day: 0.50     Years: 4.00     Types: Cigarettes     Quit date: 11/15/1991   Smokeless Tobacco     Never       Labs:  Lab Results   Component Value Date    A1C 5.8 01/12/2024    A1C 5.2 12/18/2019     Lab Results   Component Value Date    GLC 88 01/12/2024    GLC 88 09/14/2022    GLC 85 02/22/2021     Lab Results   Component Value Date    LDL 80 01/12/2024    LDL 54 02/22/2021     HDL Cholesterol   Date Value Ref Range Status   02/22/2021 59 >49 mg/dL Final     Direct Measure HDL   Date Value Ref Range Status   01/12/2024 54 >=50 mg/dL Final   ]  GFR Estimate   Date Value Ref Range Status   01/12/2024 >90 >60 " "mL/min/1.73m2 Final   02/22/2021 >90 >60 mL/min/[1.73_m2] Final     Comment:     Non  GFR Calc  Starting 12/18/2018, serum creatinine based estimated GFR (eGFR) will be   calculated using the Chronic Kidney Disease Epidemiology Collaboration   (CKD-EPI) equation.       GFR Estimate If Black   Date Value Ref Range Status   02/22/2021 >90 >60 mL/min/[1.73_m2] Final     Comment:      GFR Calc  Starting 12/18/2018, serum creatinine based estimated GFR (eGFR) will be   calculated using the Chronic Kidney Disease Epidemiology Collaboration   (CKD-EPI) equation.       Lab Results   Component Value Date    CR 0.72 01/12/2024    CR 0.67 02/22/2021     No results found for: \"MICROALBUMIN\"    Healthy Eating:  Healthy Eating Assessed Today: Yes  Cultural/Lutheran diet restrictions?: No  Do you have any food allergies or intolerances?: No  Meal planning/habits: None  Who cooks/prepares meals for you?: Self, Spouse  Who purchases food in  your home?: Self, Spouse  How many times a week on average do you eat food made away from home (restaurant/take-out)?: 4  Meals include: Breakfast, Lunch, Dinner  Breakfast: 8-9 AM: egg mcmuffin from home - 2 eggs turkey jo and cheese on a 100 calorie English muffin, black coffee  Lunch: noon-1 PM: leftovers -roast or salad with chicken, sometimes may eat out, water  Dinner: 5-7 PM: variety, may eat out,  will make roast with carrots and potatoes OR [place] - chicken sub or chili, water  Snacks: I tend to snack in the evening before bed and may have grapes or apples or chips or ice cream  Other: I love sweets and have a hard time staying away from it. Notice I am dehydrated at times  Beverages: Water  Has patient met with a dietitian in the past?: No    Being Active:  Being Active Assessed Today: Yes  Exercise:: Currently not exercising (New job that requires more sitting)  Barrier to exercise: Time    Monitoring:  Monitoring Assessed Today: " Yes  Times checking blood sugar at home (number): Never    Taking Medications:    Taking Medication Assessed Today: No  Current Treatments: None, Diet    Reducing Risks:  Reducing Risks Assessed Today: Yes  Diabetes Risks: Age over 45 years, Sedentary Lifestyle, Family History    Healthy Coping:  Healthy Coping Assessed Today: Yes  Emotional response to diabetes: Ready to learn  Informal Support system:: Family  Stage of change: ACTION (Actively working towards change)  Support resources: Websites  Patient Activation Measure Survey Score:      9/8/2010     8:00 AM   JOSE Score (Last Two)   JOSE Raw Score 39   Activation Score 56.4   JOSE Level 3     Harper Dietz RDN, LD, CDCES    Time Spent: 30 minutes  Encounter Type: Individual  A copy of this encounter was shared with the provider.

## 2024-01-24 NOTE — PROGRESS NOTES
Medical Nutrition Therapy Self-Management Education & Support  Presents for: Individual review    Type of service:  Video Visit    If the video visit is dropped, the video visit invitation should be resent by: Text to cell phone: [phone]    Originating Location (pt. Location): Other work  Distant Location (provider location): [facility]  Mode of Communication:  Video Conference via Indexing    Video Start Time:  1100  Video End Time (time video stopped): 1130    How would patient like to obtain AVS? MyChart    NUTRITION DIAGNOSIS: Food- and nutrition-related knowledge deficit related to new diagnosis of pre-diabetes as evidenced by A1c.    NUTRITION INTERVENTION:  Nutrition Prescription: Carbohydrate Intake: 30-45 grams/meal and 0-30 grams/snacks  Education given to support: general nutrition guidelines, weight reduction, consistent meals, carb counting, labeling, exercise, behavior modification, and portion control  Education Materials Provided: My Plate Planner/Choose My Plate  Motivational Interviewing    MONITOR / EVALUATE:  RD will monitor/evaluate:  Beliefs and attitudes related to food  Blood Glucose / A1c  Food and nutrition knowledge / skills  Food / Beverage / Nutrient intake   Pertinent Labs  Progress toward meeting stated nutrition-related goals  Readiness to change nutrition-related behaviors  Weight change    ASSESSMENT:  Karyna has a good initial understanding of the concepts discussed above.    PLAN:   Meal Plan Recommendation:   Goal: Use portion control and plate planning method at least 50% of the time.  Carbohydrates to aim for at meals: 30-45 grams and snacks: 0-30 grams.  Use diabetesfoPS [email or web address] for recipe and meal planning ideas  Use Hydrocapsule anni for carbohydrate reference.    Exercise / activity plan:   Recommend starting slow and increasing as tolerated to goal of 150 minutes per week.     FOLLOW-UP:  Follow-up appointment scheduled in 2 months.   "    Patient's most recent   Lab Results   Component Value Date    A1C 5.8 01/12/2024    A1C 5.2 12/18/2019         See Care Plan for co-developed, patient-state behavior change goals.  AVS provided for patient today.    SUBJECTIVE/OBJECTIVE:  Presents for: Individual review  Accompanied by: Self  Diabetes education in the past 24mo: No  Focus of Visit: Healthy Coping  Diabetes type:  (Pre-diabetes)  How confident are you filling out medical forms by yourself:: Extremely  Transportation concerns: No  Other concerns:: None  Cultural Influences/Ethnic Background:  Choose not to answer    Patient Problem List and Family Medical History reviewed for relevant medical history, current medical status, and diabetes risk factors.    Vitals:  LMP 08/20/2018   Estimated body mass index is 39.17 kg/m  as calculated from the following:    Height as of 1/12/24: 1.778 m (5' 10\").    Weight as of 9/14/22: 123.8 kg (273 lb).   Last 3 BP:   BP Readings from Last 3 Encounters:   01/12/24 110/68   09/14/22 102/70   05/18/22 112/76       History   Smoking Status    Former    Packs/day: 0.50    Years: 4.00    Types: Cigarettes    Quit date: 11/15/1991   Smokeless Tobacco    Never       Labs:  Lab Results   Component Value Date    A1C 5.8 01/12/2024    A1C 5.2 12/18/2019     Lab Results   Component Value Date    GLC 88 01/12/2024    GLC 88 09/14/2022    GLC 85 02/22/2021     Lab Results   Component Value Date    LDL 80 01/12/2024    LDL 54 02/22/2021     HDL Cholesterol   Date Value Ref Range Status   02/22/2021 59 >49 mg/dL Final     Direct Measure HDL   Date Value Ref Range Status   01/12/2024 54 >=50 mg/dL Final   ]  GFR Estimate   Date Value Ref Range Status   01/12/2024 >90 >60 mL/min/1.73m2 Final   02/22/2021 >90 >60 mL/min/[1.73_m2] Final     Comment:     Non  GFR Calc  Starting 12/18/2018, serum creatinine based estimated GFR (eGFR) will be   calculated using the Chronic Kidney Disease Epidemiology Collaboration " "  (CKD-EPI) equation.       GFR Estimate If Black   Date Value Ref Range Status   02/22/2021 >90 >60 mL/min/[1.73_m2] Final     Comment:      GFR Calc  Starting 12/18/2018, serum creatinine based estimated GFR (eGFR) will be   calculated using the Chronic Kidney Disease Epidemiology Collaboration   (CKD-EPI) equation.       Lab Results   Component Value Date    CR 0.72 01/12/2024    CR 0.67 02/22/2021     No results found for: \"MICROALBUMIN\"    Healthy Eating:  Healthy Eating Assessed Today: Yes  Cultural/Adventist diet restrictions?: No  Do you have any food allergies or intolerances?: No  Meal planning/habits: None  Who cooks/prepares meals for you?: Self, Spouse  Who purchases food in  your home?: Self, Spouse  How many times a week on average do you eat food made away from home (restaurant/take-out)?: 4  Meals include: Breakfast, Lunch, Dinner  Breakfast: 8-9 AM: egg mcmuffin from home - 2 eggs turkey jo and cheese on a 100 calorie English muffin, black coffee  Lunch: noon-1 PM: leftovers -roast or salad with chicken, sometimes may eat out, water  Dinner: 5-7 PM: variety, may eat out,  will make roast with carrots and potatoes OR [place] - chicken sub or chili, water  Snacks: I tend to snack in the evening before bed and may have grapes or apples or chips or ice cream  Other: I love sweets and have a hard time staying away from it. Notice I am dehydrated at times  Beverages: Water  Has patient met with a dietitian in the past?: No    Being Active:  Being Active Assessed Today: Yes  Exercise:: Currently not exercising (New job that requires more sitting)  Barrier to exercise: Time    Monitoring:  Monitoring Assessed Today: Yes  Times checking blood sugar at home (number): Never    Taking Medications:    Taking Medication Assessed Today: No  Current Treatments: None, Diet    Reducing Risks:  Reducing Risks Assessed Today: Yes  Diabetes Risks: Age over 45 years, Sedentary Lifestyle, " Family History    Healthy Coping:  Healthy Coping Assessed Today: Yes  Emotional response to diabetes: Ready to learn  Informal Support system:: Family  Stage of change: ACTION (Actively working towards change)  Support resources: Websites  Patient Activation Measure Survey Score:      9/8/2010     8:00 AM   JOSE Score (Last Two)   JOSE Raw Score 39   Activation Score 56.4   JOSE Level 3     Harper Dietz RDN, LD, CDCES    Time Spent: 30 minutes  Encounter Type: Individual  A copy of this encounter was shared with the provider.

## 2024-01-24 NOTE — PATIENT INSTRUCTIONS
Anurag Troncoso,    It was great meeting you today.  Below are reminders from the visit. I sent the plate planner and healthy snack ideas in the mail.     Meal Plan Recommendation:   Use portion control and plate planning method.  Carbohydrates to aim for at meals: 30-45 grams and snacks: 0-30 grams.  Use diabetesfoPoached Jobsb.[employer] for recipe and meal planning ideas  Use LanternCRM anni for carbohydrate reference.    Exercise / activity plan:   Recommend starting slow and increasing as tolerated to goal of 150 minutes per week.     Harper Dietz RDN, LD, [facility] [place]  Diabetes Education Triage Line: [phone]  Diabetes Education Appointment [phone]

## 2024-03-25 ENCOUNTER — VIRTUAL VISIT (OUTPATIENT)
Dept: EDUCATION SERVICES | Facility: CLINIC | Age: 54
End: 2024-03-25
Payer: COMMERCIAL

## 2024-03-25 DIAGNOSIS — R73.03 PREDIABETES: Primary | ICD-10-CM

## 2024-03-25 PROCEDURE — 98967 PH1 ASSMT&MGMT NQHP 11-20: CPT | Mod: 95 | Performed by: DIETITIAN, REGISTERED

## 2024-03-25 NOTE — PROGRESS NOTES
Medical Nutrition Therapy Self-Management Education & Support  Presents for: Follow up     Type of service:  Video Visit     If the video visit is dropped, the video visit invitation should be resent by: Text to cell phone: [phone]     Originating Location (pt. Location): Other work  Distant Location (provider location): [facility]  Mode of Communication:  Video Conference via [email or web address]     Video Start Time:  239  Video End Time (time video stopped): 259     How would patient like to obtain AVS? MyChart     NUTRITION DIAGNOSIS: Food- and nutrition-related knowledge deficit related to new diagnosis of pre-diabetes as evidenced by A1c.     NUTRITION INTERVENTION:  Nutrition Prescription: Carbohydrate Intake: 30-45 grams/meal and 0-30 grams/snacks  Education given to support: general nutrition guidelines, weight reduction, consistent meals, carb counting, labeling, exercise, behavior modification, and portion control  Education Materials Provided: My Plate Planner/Choose My Plate  Motivational Interviewing     MONITOR / EVALUATE:  RD will monitor/evaluate:  Beliefs and attitudes related to food  Blood Glucose / A1c  Food and nutrition knowledge / skills  Food / Beverage / Nutrient intake   Pertinent Labs  Progress toward meeting stated nutrition-related goals  Readiness to change nutrition-related behaviors  Weight change     ASSESSMENT:  Karyna reports she is doing well and has lost about 13-15 pounds. Positive reinforcement given. Reviewed resources today for ongoing weight loss. Discussed the importance of exercise for weight loss as well as stress reduction. She has a good understanding of the concepts.      PLAN:   Meal Plan Recommendation:   Goal: Use portion control and plate planning method at least 50% of the time. She has met this goal.  Carbohydrates to aim for at meals: 30-45 grams and snacks: 0-30 grams.  Use diabetesfoodhub.org for recipe and meal planning ideas  Use Calorie  "Tk anni for carbohydrate reference.     Exercise / activity plan:   Recommend starting slow and increasing as tolerated to goal of 150 minutes per week.      FOLLOW-UP:  She will call to schedule.      Patient's most recent         Lab Results   Component Value Date     A1C 5.8 01/12/2024     A1C 5.2 12/18/2019          See Care Plan for co-developed, patient-state behavior change goals.  AVS provided for patient today.    SUBJECTIVE/OBJECTIVE:  Presents for: Follow-up  Accompanied by: Self  Diabetes education in the past 24mo: Yes  Focus of Visit: Healthy Eating  Diabetes type: Other (see Comments)  Disease course: Stable  How confident are you filling out medical forms by yourself:: Extremely  Diabetes management related comments/concerns: Following low carbohydrate meal plan.  Transportation concerns: No  Other concerns:: None  Cultural Influences/Ethnic Background:  Choose not to answer  Weight trend: Decreasing  Disease course: Stable    Patient Problem List and Family Medical History reviewed for relevant medical history, current medical status, and diabetes risk factors.    Vitals:  LMP 08/20/2018   Estimated body mass index is 39.17 kg/m  as calculated from the following:    Height as of 1/12/24: 1.778 m (5' 10\").    Weight as of 9/14/22: 123.8 kg (273 lb).   Last 3 BP:   BP Readings from Last 3 Encounters:   01/12/24 110/68   09/14/22 102/70   05/18/22 112/76       History   Smoking Status    Former    Packs/day: 0.50    Years: 4.00    Types: Cigarettes    Quit date: 11/15/1991   Smokeless Tobacco    Never       Labs:  Lab Results   Component Value Date    A1C 5.8 01/12/2024    A1C 5.2 12/18/2019     Lab Results   Component Value Date    GLC 88 01/12/2024    GLC 88 09/14/2022    GLC 85 02/22/2021     Lab Results   Component Value Date    LDL 80 01/12/2024    LDL 54 02/22/2021     HDL Cholesterol   Date Value Ref Range Status   02/22/2021 59 >49 mg/dL Final     Direct Measure HDL   Date Value Ref Range " "Status   01/12/2024 54 >=50 mg/dL Final   ]  GFR Estimate   Date Value Ref Range Status   01/12/2024 >90 >60 mL/min/1.73m2 Final   02/22/2021 >90 >60 mL/min/[1.73_m2] Final     Comment:     Non  GFR Calc  Starting 12/18/2018, serum creatinine based estimated GFR (eGFR) will be   calculated using the Chronic Kidney Disease Epidemiology Collaboration   (CKD-EPI) equation.       GFR Estimate If Black   Date Value Ref Range Status   02/22/2021 >90 >60 mL/min/[1.73_m2] Final     Comment:      GFR Calc  Starting 12/18/2018, serum creatinine based estimated GFR (eGFR) will be   calculated using the Chronic Kidney Disease Epidemiology Collaboration   (CKD-EPI) equation.       Lab Results   Component Value Date    CR 0.72 01/12/2024    CR 0.67 02/22/2021     No results found for: \"MICROALBUMIN\"    Healthy Eating:  Healthy Eating Assessed Today: Yes  Cultural/Uatsdin diet restrictions?: No  Do you have any food allergies or intolerances?: No  Meal planning/habits: Smaller portions  Who cooks/prepares meals for you?: Spouse  Who purchases food in  your home?: Self, Spouse  How many times a week on average do you eat food made away from home (restaurant/take-out)?: 2  Meals include: Breakfast, Dinner, Lunch  Beverages: Water, Coffee  Has patient met with a dietitian in the past?: Yes    Being Active:  Being Active Assessed Today: Yes  Exercise:: Currently not exercising    Healthy Coping:  Emotional response to diabetes: Ready to learn  Informal Support system:: Spouse  Stage of change: ACTION (Actively working towards change)  Support resources: Websites  Patient Activation Measure Survey Score:      9/8/2010     8:00 AM   JOSE Score (Last Two)   JOSE Raw Score 39   Activation Score 56.4   JOSE Level 3     Harper Dietz RDN, LD, [facility]    Time Spent: 20 minutes  Encounter Type: Individual    Any diabetes medication dose changes were made via the CDE Protocol per the patient's referring provider. A " copy of this encounter was shared with the provider.

## 2024-03-25 NOTE — LETTER
3/25/2024         RE: Lucinda B Goltz  [id number] [address] [address] [facility] [id number]        Dear Colleague,    Thank you for referring your patient, Lucinda B Goltz, to the [facility]. Please see a copy of my visit note below.    Medical Nutrition Therapy Self-Management Education & Support  Presents for: Follow up     Type of service:  Video Visit     If the video visit is dropped, the video visit invitation should be resent by: Text to cell phone: [phone]     Originating Location (pt. Location): Other work  Distant Location (provider location): [facility]  Mode of Communication:  Video Conference via InfoScout     Video Start Time:  239  Video End Time (time video stopped): 259     How would patient like to obtain AVS? MyChart     NUTRITION DIAGNOSIS: Food- and nutrition-related knowledge deficit related to new diagnosis of pre-diabetes as evidenced by A1c.     NUTRITION INTERVENTION:  Nutrition Prescription: Carbohydrate Intake: 30-45 grams/meal and 0-30 grams/snacks  Education given to support: general nutrition guidelines, weight reduction, consistent meals, carb counting, labeling, exercise, behavior modification, and portion control  Education Materials Provided: My Plate Planner/Choose My Plate  Motivational Interviewing     MONITOR / EVALUATE:  RD will monitor/evaluate:  Beliefs and attitudes related to food  Blood Glucose / A1c  Food and nutrition knowledge / skills  Food / Beverage / Nutrient intake   Pertinent Labs  Progress toward meeting stated nutrition-related goals  Readiness to change nutrition-related behaviors  Weight change     ASSESSMENT:  Karyna reports she is doing well and has lost about 13-15 pounds. Positive reinforcement given. Reviewed resources today for ongoing weight loss. Discussed the importance of exercise for weight loss as well as stress reduction. She has a good understanding of the concepts.      PLAN:   Meal Plan  "Recommendation:   Goal: Use portion control and plate planning method at least 50% of the time. She has met this goal.  Carbohydrates to aim for at meals: 30-45 grams and snacks: 0-30 grams.  Use Gravity.[employer] for recipe and meal planning ideas  Use ClrTouch anni for carbohydrate reference.     Exercise / activity plan:   Recommend starting slow and increasing as tolerated to goal of 150 minutes per week.      FOLLOW-UP:  She will call to schedule.      Patient's most recent         Lab Results   Component Value Date     A1C 5.8 01/12/2024     A1C 5.2 12/18/2019          See Care Plan for co-developed, patient-state behavior change goals.  AVS provided for patient today.    SUBJECTIVE/OBJECTIVE:  Presents for: Follow-up  Accompanied by: Self  Diabetes education in the past 24mo: Yes  Focus of Visit: Healthy Eating  Diabetes type: Other (see Comments)  Disease course: Stable  How confident are you filling out medical forms by yourself:: Extremely  Diabetes management related comments/concerns: Following low carbohydrate meal plan.  Transportation concerns: No  Other concerns:: None  Cultural Influences/Ethnic Background:  Choose not to answer  Weight trend: Decreasing  Disease course: Stable    Patient Problem List and Family Medical History reviewed for relevant medical history, current medical status, and diabetes risk factors.    Vitals:  LMP 08/20/2018   Estimated body mass index is 39.17 kg/m  as calculated from the following:    Height as of 1/12/24: 1.778 m (5' 10\").    Weight as of 9/14/22: 123.8 kg (273 lb).   Last 3 BP:   BP Readings from Last 3 Encounters:   01/12/24 110/68   09/14/22 102/70   05/18/22 112/76       History   Smoking Status     Former     Packs/day: 0.50     Years: 4.00     Types: Cigarettes     Quit date: 11/15/1991   Smokeless Tobacco     Never       Labs:  Lab Results   Component Value Date    A1C 5.8 01/12/2024    A1C 5.2 12/18/2019     Lab Results   Component Value Date    GLC " "88 01/12/2024    GLC 88 09/14/2022    GLC 85 02/22/2021     Lab Results   Component Value Date    LDL 80 01/12/2024    LDL 54 02/22/2021     HDL Cholesterol   Date Value Ref Range Status   02/22/2021 59 >49 mg/dL Final     Direct Measure HDL   Date Value Ref Range Status   01/12/2024 54 >=50 mg/dL Final   ]  GFR Estimate   Date Value Ref Range Status   01/12/2024 >90 >60 mL/min/1.73m2 Final   02/22/2021 >90 >60 mL/min/[1.73_m2] Final     Comment:     Non  GFR Calc  Starting 12/18/2018, serum creatinine based estimated GFR (eGFR) will be   calculated using the Chronic Kidney Disease Epidemiology Collaboration   (CKD-EPI) equation.       GFR Estimate If Black   Date Value Ref Range Status   02/22/2021 >90 >60 mL/min/[1.73_m2] Final     Comment:      GFR Calc  Starting 12/18/2018, serum creatinine based estimated GFR (eGFR) will be   calculated using the Chronic Kidney Disease Epidemiology Collaboration   (CKD-EPI) equation.       Lab Results   Component Value Date    CR 0.72 01/12/2024    CR 0.67 02/22/2021     No results found for: \"MICROALBUMIN\"    Healthy Eating:  Healthy Eating Assessed Today: Yes  Cultural/Sabianist diet restrictions?: No  Do you have any food allergies or intolerances?: No  Meal planning/habits: Smaller portions  Who cooks/prepares meals for you?: Spouse  Who purchases food in  your home?: Self, Spouse  How many times a week on average do you eat food made away from home (restaurant/take-out)?: 2  Meals include: Breakfast, Dinner, Lunch  Beverages: Water, Coffee  Has patient met with a dietitian in the past?: Yes    Being Active:  Being Active Assessed Today: Yes  Exercise:: Currently not exercising    Healthy Coping:  Emotional response to diabetes: Ready to learn  Informal Support system:: Spouse  Stage of change: ACTION (Actively working towards change)  Support resources: Websites  Patient Activation Measure Survey Score:      9/8/2010     8:00 AM   JOSE Score " (Last Two)   JOSE Raw Score 39   Activation Score 56.4   JOSE Level 3     Harper Dietz RDN, LD, CDCES    Time Spent: 20 minutes  Encounter Type: Individual    Any diabetes medication dose changes were made via the CDE Protocol per the patient's referring provider. A copy of this encounter was shared with the provider.

## 2024-05-20 ENCOUNTER — TRANSFERRED RECORDS (OUTPATIENT)
Dept: HEALTH INFORMATION MANAGEMENT | Facility: CLINIC | Age: 54
End: 2024-05-20
Payer: COMMERCIAL

## 2024-05-28 ENCOUNTER — PATIENT OUTREACH (OUTPATIENT)
Dept: GASTROENTEROLOGY | Facility: CLINIC | Age: 54
End: 2024-05-28
Payer: COMMERCIAL

## 2024-10-14 ENCOUNTER — NURSE TRIAGE (OUTPATIENT)
Dept: FAMILY MEDICINE | Facility: CLINIC | Age: 54
End: 2024-10-14
Payer: COMMERCIAL

## 2024-10-14 NOTE — TELEPHONE ENCOUNTER
Nurse Triage SBAR    Is this a 2nd Level Triage? NO    Situation: Patient has had cough since September    Background:  Negative for COVID    Assessment: Cold for a while- since September. Congested, coughing- worse at work. Tested negative for COVID last week. Congestions feels like it may be improving. Denies SOB. Not coughing anything up today, but does sometime. Patient had a sore throat at the beginning but since resolved.     Protocol Recommended Disposition:   See in Office Within 3 Days    Recommendation:   Appointments in Next Year      Oct 17, 2024 11:30 AM  (Arrive by 11:10 AM)  Provider Visit with Margaret Alonzo PA-C  [facility] ([facility] )  Arrive at: [facility] [phone]         Does the patient meet one of the following criteria for ADS visit consideration? 16+ years old, with an [facility] PCP     TIP  Providers, please consider if this condition is appropriate for management at one of our Acute and Diagnostic Services sites.     If patient is a good candidate, please use dotphrase <dot>triageresponse and select Refer to ADS to document.  Reason for Disposition   Cough has been present for > 3 weeks    Additional Information   Negative: Bluish (or gray) lips or face   Negative: SEVERE difficulty breathing (e.g., struggling for each breath, speaks in single words)   Negative: Rapid onset of cough and has hives   Negative: Coughing started suddenly after medicine, an allergic food or bee sting   Negative: Difficulty breathing after exposure to flames, smoke, or fumes   Negative: Sounds like a life-threatening emergency to the triager   Negative: MODERATE difficulty breathing (e.g., speaks in phrases, SOB even at rest, pulse 100-120) and still present when not coughing   Negative: Chest pain present when not coughing   Negative: Passed out (i.e., fainted, collapsed and was not responding)   Negative: Patient sounds very sick or weak to the  triager   Negative: MILD difficulty breathing (e.g., minimal/no SOB at rest, SOB with walking, pulse <100) and still present when not coughing   Negative: Coughed up > 1 tablespoon (15 ml) blood (Exception: Blood-tinged sputum.)   Negative: Fever > 103 F (39.4 C)   Negative: Fever > 101 F (38.3 C) and over 60 years of age   Negative: Fever > 100.0 F (37.8 C) and has diabetes mellitus or a weak immune system (e.g., HIV positive, cancer chemotherapy, organ transplant, splenectomy, chronic steroids)   Negative: Fever > 100.0 F (37.8 C) and bedridden (e.g., CVA, chronic illness, recovering from surgery)   Negative: Increasing ankle swelling   Negative: Wheezing is present   Negative: SEVERE coughing spells (e.g., whooping sound after coughing, vomiting after coughing)   Negative: Coughing up heriberto-colored (reddish-brown) or blood-tinged sputum   Negative: Fever present > 3 days (72 hours)   Negative: Fever returns after gone for over 24 hours and symptoms worse or not improved   Negative: Using nasal washes and pain medicine > 24 hours and sinus pain persists   Negative: Known COPD or other severe lung disease (i.e., bronchiectasis, cystic fibrosis, lung surgery) and worsening symptoms (i.e., increased sputum purulence or amount, increased breathing difficulty)   Negative: Continuous (nonstop) coughing interferes with work or school and no improvement using cough treatment per Care Advice   Negative: Patient wants to be seen    Protocols used: Cough-A-OH

## 2024-10-17 ENCOUNTER — ANCILLARY PROCEDURE (OUTPATIENT)
Dept: GENERAL RADIOLOGY | Facility: CLINIC | Age: 54
End: 2024-10-17
Payer: COMMERCIAL

## 2024-10-17 ENCOUNTER — OFFICE VISIT (OUTPATIENT)
Dept: FAMILY MEDICINE | Facility: CLINIC | Age: 54
End: 2024-10-17
Payer: COMMERCIAL

## 2024-10-17 VITALS
OXYGEN SATURATION: 98 % | WEIGHT: 293 LBS | SYSTOLIC BLOOD PRESSURE: 128 MMHG | HEIGHT: 70 IN | RESPIRATION RATE: 16 BRPM | TEMPERATURE: 97.9 F | HEART RATE: 59 BPM | BODY MASS INDEX: 41.95 KG/M2 | DIASTOLIC BLOOD PRESSURE: 84 MMHG

## 2024-10-17 DIAGNOSIS — R05.3 CHRONIC COUGH: ICD-10-CM

## 2024-10-17 DIAGNOSIS — J32.9 SINUSITIS, UNSPECIFIED CHRONICITY, UNSPECIFIED LOCATION: ICD-10-CM

## 2024-10-17 DIAGNOSIS — R05.3 CHRONIC COUGH: Primary | ICD-10-CM

## 2024-10-17 DIAGNOSIS — L85.3 DRY SKIN: ICD-10-CM

## 2024-10-17 LAB
ERYTHROCYTE [DISTWIDTH] IN BLOOD BY AUTOMATED COUNT: 13.2 % (ref 10–15)
HCT VFR BLD AUTO: 43 % (ref 35–47)
HGB BLD-MCNC: 14.2 G/DL (ref 11.7–15.7)
MCH RBC QN AUTO: 30.7 PG (ref 26.5–33)
MCHC RBC AUTO-ENTMCNC: 33 G/DL (ref 31.5–36.5)
MCV RBC AUTO: 93 FL (ref 78–100)
PLATELET # BLD AUTO: 268 10E3/UL (ref 150–450)
RBC # BLD AUTO: 4.62 10E6/UL (ref 3.8–5.2)
TSH SERPL DL<=0.005 MIU/L-ACNC: 2.04 UIU/ML (ref 0.3–4.2)
WBC # BLD AUTO: 6.1 10E3/UL (ref 4–11)

## 2024-10-17 PROCEDURE — 71046 X-RAY EXAM CHEST 2 VIEWS: CPT | Mod: TC | Performed by: RADIOLOGY

## 2024-10-17 PROCEDURE — 85027 COMPLETE CBC AUTOMATED: CPT

## 2024-10-17 PROCEDURE — 84443 ASSAY THYROID STIM HORMONE: CPT

## 2024-10-17 PROCEDURE — 36415 COLL VENOUS BLD VENIPUNCTURE: CPT

## 2024-10-17 PROCEDURE — 99214 OFFICE O/P EST MOD 30 MIN: CPT

## 2024-10-17 PROCEDURE — G2211 COMPLEX E/M VISIT ADD ON: HCPCS

## 2024-10-17 RX ORDER — BENZONATATE 100 MG/1
100 CAPSULE ORAL 3 TIMES DAILY PRN
Qty: 90 CAPSULE | Refills: 0 | Status: SHIPPED | OUTPATIENT
Start: 2024-10-17 | End: 2024-11-16

## 2024-10-17 ASSESSMENT — PAIN SCALES - GENERAL: PAINLEVEL: NO PAIN (0)

## 2024-10-17 NOTE — PROGRESS NOTES
"  Assessment & Plan     Chronic cough  Sinusitis, unspecified chronicity, unspecified location  Cough has been ongoing for several weeks, so we will obatin chest xray today to further investigate cause. CBC also obtained to determine whether cough is more due to a bacterial vs allergy cause. Encouraged patient to continue taking dyquil/nyquil and mucinex. Also prescribed Tessalon Perles to help with cough. If chest xray shows an atypical pneumonia, I will prescribe an antibiotic as appropriate.    - XR Chest 2 Views  - benzonatate (TESSALON) 100 MG capsule  Dispense: 90 capsule; Refill: 0  - CBC with platelets    Dry skin  Patient noted multiple times throughout appointment that skin and mucous membranes have been very dry. We will check a TSH today.  - TSH with free T4 reflex    Can take ibuprofen and tylenol every 4-6 hours as needed for myalgias, fevers, and headaches. Also encouraged patient drink plenty of fluids and electrolyte drinks and prioritize rest and getting good sleep.     Patient should follow up in 1 week if symptoms do not improve or sooner for new or worsening symptoms. All questions answered to patient's satisfaction. Warning signs of when to seek emergency care were discussed.     Margaret Alonzo PA-C      BMI  Estimated body mass index is 42.62 kg/m  as calculated from the following:    Height as of this encounter: 1.778 m (5' 10\").    Weight as of this encounter: 134.7 kg (297 lb).   Weight management plan: Patient was referred to their PCP to discuss a diet and exercise plan.      Yeny Troncoso is a 54 year old, presenting for the following health issues:  URI        10/17/2024    11:11 AM   Additional Questions   Roomed by ADE WINTER CMA   Accompanied by SELF       History of Present Illness       Reason for visit:  Chronic allergies or cold... Not sure  Symptom onset:  More than a month  Symptoms include:  Congestion, sore/dry throat, cough  Symptom intensity:  Moderate  Symptom progression: " " Improving  Had these symptoms before:  Yes  Has tried/received treatment for these symptoms:  Yes  Previous treatment was successful:  No      Symptoms:  Fever: No  Chills/Sweats: No  Headache (location?): No  Sinus Pressure: YES  Conjunctivitis:  No  Ear Pain: no  Rhinorrhea: YES  Congestion: YES  Sore Throat: dry   Cough: YES-non-productive, productive , tickle in the back of throat   Wheeze: No  Decreased Appetite: No  Nausea: No  Vomiting: No  Diarrhea: No  Sick/Strep Exposure: No  Therapies tried and outcome: mucinex (made cough worse) , day quil and night quil minor relief     Symptoms started in early September. No fevers. No body aches or chills.     Endorses congestion, rhinorrhea, and coughing. Cough is worse at work. Dry cough. Throat is dry. No longer productive. Doesn't normally have allergies. Has not tried taking any medications.     COVID test a few weeks ago was negative. Doesn't feel like COVID she has had in the past.       Review of Systems  Constitutional, neuro, ENT, endocrine, pulmonary, cardiac, gastrointestinal, genitourinary, musculoskeletal, integument and psychiatric systems are negative, except as otherwise noted.      Objective    /84   Pulse 59   Temp 97.9  F (36.6  C) (Tympanic)   Resp 16   Ht 1.778 m (5' 10\")   Wt 134.7 kg (297 lb)   LMP 08/20/2018   SpO2 98%   BMI 42.62 kg/m    Body mass index is 42.62 kg/m .  Physical Exam  Constitutional:       General: She is not in acute distress.     Appearance: She is ill-appearing. She is not toxic-appearing or diaphoretic.   HENT:      Head: Normocephalic.      Right Ear: Tympanic membrane and ear canal normal.      Left Ear: Tympanic membrane and ear canal normal.      Nose: Congestion and rhinorrhea present.      Mouth/Throat:      Mouth: Mucous membranes are moist.      Pharynx: Oropharynx is clear. Posterior oropharyngeal erythema present. No oropharyngeal exudate.   Eyes:      General: No scleral icterus.     Extraocular " Movements: Extraocular movements intact.      Conjunctiva/sclera: Conjunctivae normal.      Pupils: Pupils are equal, round, and reactive to light.   Cardiovascular:      Rate and Rhythm: Normal rate and regular rhythm.      Heart sounds: Normal heart sounds. No murmur heard.     No friction rub. No gallop.   Pulmonary:      Effort: Pulmonary effort is normal. No respiratory distress.      Breath sounds: Normal breath sounds. No stridor. No wheezing, rhonchi or rales.   Musculoskeletal:         General: Normal range of motion.      Cervical back: Normal range of motion and neck supple. No tenderness.   Lymphadenopathy:      Cervical: No cervical adenopathy.   Skin:     General: Skin is warm and dry.   Neurological:      General: No focal deficit present.      Mental Status: She is alert.   Psychiatric:         Mood and Affect: Mood normal.         Behavior: Behavior normal.         Thought Content: Thought content normal.         Judgment: Judgment normal.            Signed Electronically by: Margaret Alonzo PA-C

## 2024-10-17 NOTE — PATIENT INSTRUCTIONS
It was a pleasure and a privilege caring for you today. Please see below regarding next steps:    - Start taking Dayquil/Nyquil.  - Mucinex for cough  - Tessalon Perles for cough

## 2025-01-09 SDOH — HEALTH STABILITY: PHYSICAL HEALTH: ON AVERAGE, HOW MANY MINUTES DO YOU ENGAGE IN EXERCISE AT THIS LEVEL?: 20 MIN

## 2025-01-09 SDOH — HEALTH STABILITY: PHYSICAL HEALTH: ON AVERAGE, HOW MANY DAYS PER WEEK DO YOU ENGAGE IN MODERATE TO STRENUOUS EXERCISE (LIKE A BRISK WALK)?: 1 DAY

## 2025-01-09 ASSESSMENT — SOCIAL DETERMINANTS OF HEALTH (SDOH): HOW OFTEN DO YOU GET TOGETHER WITH FRIENDS OR RELATIVES?: ONCE A WEEK

## 2025-01-13 ENCOUNTER — HOSPITAL ENCOUNTER (OUTPATIENT)
Dept: MAMMOGRAPHY | Facility: CLINIC | Age: 55
Discharge: HOME | End: 2025-01-13
Attending: FAMILY MEDICINE | Admitting: FAMILY MEDICINE
Payer: COMMERCIAL

## 2025-01-13 ENCOUNTER — MYC MEDICAL ADVICE (OUTPATIENT)
Dept: FAMILY MEDICINE | Facility: CLINIC | Age: 55
End: 2025-01-13
Payer: COMMERCIAL

## 2025-01-13 DIAGNOSIS — Z12.31 VISIT FOR SCREENING MAMMOGRAM: ICD-10-CM

## 2025-01-13 PROCEDURE — 77063 BREAST TOMOSYNTHESIS BI: CPT

## 2025-01-13 PROCEDURE — 77067 SCR MAMMO BI INCL CAD: CPT

## 2025-03-25 ENCOUNTER — OFFICE VISIT (OUTPATIENT)
Dept: FAMILY MEDICINE | Facility: CLINIC | Age: 55
End: 2025-03-25
Attending: FAMILY MEDICINE
Payer: COMMERCIAL

## 2025-03-25 VITALS
HEART RATE: 84 BPM | OXYGEN SATURATION: 96 % | TEMPERATURE: 97.1 F | HEIGHT: 70 IN | BODY MASS INDEX: 41.95 KG/M2 | RESPIRATION RATE: 18 BRPM | WEIGHT: 293 LBS | SYSTOLIC BLOOD PRESSURE: 122 MMHG | DIASTOLIC BLOOD PRESSURE: 84 MMHG

## 2025-03-25 DIAGNOSIS — L72.3 SEBACEOUS CYST: ICD-10-CM

## 2025-03-25 DIAGNOSIS — R39.15 URINARY URGENCY: ICD-10-CM

## 2025-03-25 DIAGNOSIS — R73.03 PREDIABETES: ICD-10-CM

## 2025-03-25 DIAGNOSIS — D12.6 TUBULAR ADENOMA OF COLON: ICD-10-CM

## 2025-03-25 DIAGNOSIS — Z00.00 ROUTINE GENERAL MEDICAL EXAMINATION AT A HEALTH CARE FACILITY: Primary | ICD-10-CM

## 2025-03-25 DIAGNOSIS — Z28.20 VACCINE REFUSED BY PATIENT: ICD-10-CM

## 2025-03-25 DIAGNOSIS — Z23 NEED FOR TDAP VACCINATION: ICD-10-CM

## 2025-03-25 DIAGNOSIS — E66.01 MORBID OBESITY DUE TO EXCESS CALORIES (H): ICD-10-CM

## 2025-03-25 DIAGNOSIS — E66.813 CLASS 3 DRUG-INDUCED OBESITY WITH SERIOUS COMORBIDITY AND BODY MASS INDEX (BMI) OF 40.0 TO 44.9 IN ADULT (H): ICD-10-CM

## 2025-03-25 DIAGNOSIS — E66.1 CLASS 3 DRUG-INDUCED OBESITY WITH SERIOUS COMORBIDITY AND BODY MASS INDEX (BMI) OF 40.0 TO 44.9 IN ADULT (H): ICD-10-CM

## 2025-03-25 DIAGNOSIS — Z13.6 CARDIOVASCULAR SCREENING; LDL GOAL LESS THAN 130: ICD-10-CM

## 2025-03-25 LAB
ERYTHROCYTE [DISTWIDTH] IN BLOOD BY AUTOMATED COUNT: 13.4 % (ref 10–15)
EST. AVERAGE GLUCOSE BLD GHB EST-MCNC: 108 MG/DL
HBA1C MFR BLD: 5.4 % (ref 0–5.6)
HCT VFR BLD AUTO: 40.1 % (ref 35–47)
HGB BLD-MCNC: 13.4 G/DL (ref 11.7–15.7)
MCH RBC QN AUTO: 31 PG (ref 26.5–33)
MCHC RBC AUTO-ENTMCNC: 33.4 G/DL (ref 31.5–36.5)
MCV RBC AUTO: 93 FL (ref 78–100)
PLATELET # BLD AUTO: 226 10E3/UL (ref 150–450)
RBC # BLD AUTO: 4.32 10E6/UL (ref 3.8–5.2)
WBC # BLD AUTO: 6.7 10E3/UL (ref 4–11)

## 2025-03-25 PROCEDURE — 36415 COLL VENOUS BLD VENIPUNCTURE: CPT | Performed by: FAMILY MEDICINE

## 2025-03-25 PROCEDURE — 83036 HEMOGLOBIN GLYCOSYLATED A1C: CPT | Performed by: FAMILY MEDICINE

## 2025-03-25 PROCEDURE — 85027 COMPLETE CBC AUTOMATED: CPT | Performed by: FAMILY MEDICINE

## 2025-03-25 SDOH — HEALTH STABILITY: PHYSICAL HEALTH: ON AVERAGE, HOW MANY DAYS PER WEEK DO YOU ENGAGE IN MODERATE TO STRENUOUS EXERCISE (LIKE A BRISK WALK)?: 1 DAY

## 2025-03-25 SDOH — HEALTH STABILITY: PHYSICAL HEALTH: ON AVERAGE, HOW MANY MINUTES DO YOU ENGAGE IN EXERCISE AT THIS LEVEL?: 20 MIN

## 2025-03-25 ASSESSMENT — SOCIAL DETERMINANTS OF HEALTH (SDOH): HOW OFTEN DO YOU GET TOGETHER WITH FRIENDS OR RELATIVES?: ONCE A WEEK

## 2025-03-25 ASSESSMENT — ANXIETY QUESTIONNAIRES
1. FEELING NERVOUS, ANXIOUS, OR ON EDGE: NOT AT ALL
6. BECOMING EASILY ANNOYED OR IRRITABLE: NOT AT ALL
7. FEELING AFRAID AS IF SOMETHING AWFUL MIGHT HAPPEN: NOT AT ALL
GAD7 TOTAL SCORE: 0
2. NOT BEING ABLE TO STOP OR CONTROL WORRYING: NOT AT ALL
GAD7 TOTAL SCORE: 0
GAD7 TOTAL SCORE: 0
5. BEING SO RESTLESS THAT IT IS HARD TO SIT STILL: NOT AT ALL
4. TROUBLE RELAXING: NOT AT ALL
3. WORRYING TOO MUCH ABOUT DIFFERENT THINGS: NOT AT ALL
7. FEELING AFRAID AS IF SOMETHING AWFUL MIGHT HAPPEN: NOT AT ALL

## 2025-03-25 ASSESSMENT — PATIENT HEALTH QUESTIONNAIRE - PHQ9
SUM OF ALL RESPONSES TO PHQ QUESTIONS 1-9: 0
SUM OF ALL RESPONSES TO PHQ QUESTIONS 1-9: 0

## 2025-03-25 NOTE — PROGRESS NOTES
"Answers submitted by the patient for this visit:  Patient Health Questionnaire (Submitted on 3/25/2025)  PHQ9 TOTAL SCORE: 0  Patient Health Questionnaire (G7) (Submitted on 3/25/2025)  DONALD 7 TOTAL SCORE: 0  Preventive Care Visit  [facility] PRIOR ZAIDA Valdovinos MD, Family Medicine  Mar 25, 2025      Assessment & Plan :       ICD-10-CM    1. Routine general medical examination at a health care facility  Z00.00 REVIEW OF HEALTH MAINTENANCE PROTOCOL ORDERS      2. Morbid obesity due to excess calories (H)  E66.01       3. Vaccine refused by patient- Hepatitis B , Pneumonia, shingles, influenza  Z28.20       4. Prediabetes - new dx 1/12/2024 - a1C = 5.8  R73.03 TSH with free T4 reflex     Hemoglobin A1c     Comprehensive metabolic panel     CBC with platelets     Albumin Random Urine Quantitative with Creat Ratio     TSH with free T4 reflex     Hemoglobin A1c     Comprehensive metabolic panel     CBC with platelets     Albumin Random Urine Quantitative with Creat Ratio      5. Tubular adenomas of colon - 2 in 6/2019 - repeat colonoscopy 5/20/2024 = 2 sessile serrated adenomas - 2-3mm= repeat in 2029  D12.6       6. CARDIOVASCULAR SCREENING; LDL GOAL LESS THAN 130  Z13.6 Lipid panel reflex to direct LDL Fasting     Lipid panel reflex to direct LDL Fasting      7. Sebaceous cyst- 8mm right upper posterior scalp- recurred - deisres removal  L72.3       8. Need for Tdap vaccination  Z23 TDAP 10-64Y (ADACEL,BOOSTRIX)      9. Urinary urgency  R39.15       10. Class 3 drug-induced obesity with serious comorbidity and body mass index (BMI) of 40.0 to 44.9 in adult (H)  E66.813     E66.1     Z68.41         Assessment & Plan  - Morbid obesity due to excess calories (H): Discussed weight management, nutrition, and exercise. Recommended \"Atomic Habits\" by Arnaldo Aguero for lifestyle changes.    - Prediabetes: Continue monitoring. Emphasized nutrition and exercise.    - Tubular adenoma of colon: History " "of small sessile serrated adenomas. Repeat colonoscopy in 5 years.    - Sebaceous cyst: Cyst on the head has returned. Schedule removal.    - Need for Tdap vaccination: Administer Tdap vaccine.    Patient has been advised of split billing requirements and indicates understanding: Yes    Pt declines medication for urinary urgency at this time. Discussed bladder irritants - see AVS.     Counseling  Appropriate preventive services were addressed with this patient via screening, questionnaire, or discussion as appropriate for fall prevention, nutrition, physical activity, Tobacco-use cessation, social engagement, weight loss and cognition.  Checklist reviewing preventive services available has been given to the patient.  Reviewed patient's diet, addressing concerns and/or questions.   She is at risk for lack of exercise and has been provided with information to increase physical activity for the benefit of her well-being.   She is at risk for psychosocial distress and has been provided with information to reduce risk.       MEDICATIONS:  Continue current medications without change  Work on weight loss  Regular exercise  See Patient Instructions    The longitudinal plan of care for the diagnosis(es)/condition(s) as documented were addressed during this visit. Due to the added complexity in care, I will continue to support Karyna in the subsequent management and with ongoing continuity of care.    \"Consent was obtained from the patient to use an AI scribe/documentation tool in the creation of this note.This note/dictation was performed and completed with the assistance of voice recognition software and an AI scribe. It may contain inadvertant transcription  errors,  omissions and/or  inadvertent word substitution.\" --Roxann Valdovinos MD           Subjective :   Karyna is a 54 year old, presenting for the following:  Physical  and the following other medical problems:      1. Routine general medical examination at a " health care facility    2. Morbid obesity due to excess calories (H)    3. Vaccine refused by patient- Hepatitis B , Pneumonia, shingles, influenza    4. Prediabetes - new dx 1/12/2024 - a1C = 5.8    5. Tubular adenomas of colon - 2 in 6/2019 - repeat colonoscopy 5/20/2024 = 2 sessile serrated adenomas - 2-3mm= repeat in 2029    6. CARDIOVASCULAR SCREENING; LDL GOAL LESS THAN 130    7. Sebaceous cyst- 8mm right upper posterior scalp- recurred - deisres removal    8. Need for Tdap vaccination    9. Urinary urgency    10. Class 3 drug-induced obesity with serious comorbidity and body mass index (BMI) of 40.0 to 44.9 in adult (H)            3/25/2025     9:13 AM   Additional Questions   Roomed by Laney TROY MA   Accompanied by Self          HPI  Vitals are w/in NL,   History of Present Illness-  Luci B Goltz, 54 years    Hiatal Hernia Surgery ()  She is worried about her 's high blood pressure, which is still high at 170/100 after his hiatal hernia surgery. He is still in the hospital but might come home soon.    Immunizations  She did not get a flu shot this year and usually doesn't because she thinks people get sicker after the shot. She is also worried about side effects from the shingles vaccine, even though she had chickenpox as a child.    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 She had a rash about a month ago that looked like pimples in her mid chest/upper cleavage areaand thinks it was from stress.    Colonoscopy  She had a colonoscopy on May 20th last year. They found small polyps, including two sessile serrated adenomas, which could become cancerous, but there was no dysplasia. She needs another colonoscopy in five years.    Cyst on Head  She had a cyst removed from her head, but it came back. The removal was uncomfortable, and she is unsure about doing it again since it doesn't bother her.    Weight and  Exercise  She is struggling with her weight and is a stress eater. Her son tells her to count calories. 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 She lifts her [id number] granddaughter, Isael, as a form of exercise. Increased stress also recently with  having hernia surgery and having elevated blood pressures = prolonging his hospitalization.     Hip and Back Pain  She has hip and back pain from tight muscles andsome right groin pain that comes and goes. Doesn't want to see orthopedics right now. . Her back problems flared up a couple of weeks ago, affecting her hip, but it's better now. She does stretches for her hip flexors from physical therapy. Encouraged her to do her low back exercises daily.     Bladder Concerns  She hasn't tried the medication - oxybutynin for bladder urgency because she's worried about mouth, and eye dryness. She manages symptoms by watching her water intake and discussed avoiding bladder irritants - see AVS - collin avoiding caffeine and alcohol.    Anxiety - not on medication for this right now.   How are you doing with your anxiety since your last visit? No change  Are you having other symptoms that might be associated with anxiety? No  Have you had a significant life event? No   Are you feeling depressed? No  Do you have any concerns with your use of alcohol or other drugs? No    Social History     Tobacco Use    Smoking status: Former     Current packs/day: 0.00     Average packs/day: 0.5 packs/day for 4.0 years (2.0 ttl pk-yrs)     Types: Cigarettes     Start date: 11/15/1987     Quit date: 11/15/1991     Years since quittin.3     Passive exposure: Past    Smokeless tobacco: Never   Vaping Use    Vaping status: Never Used   Substance Use Topics    Alcohol use: Yes     Comment: rare    Drug use: No     Comment: no herbal meds either          2018    10:18 AM 2019     8:20 AM 3/25/2025     8:59 AM   DONALD-7 SCORE   Total Score   0 (minimal  anxiety)   Total Score 2 0 0        Patient-reported         12/19/2018    10:18 AM 12/18/2019     8:20 AM 3/25/2025     8:58 AM   PHQ   PHQ-9 Total Score 1 0 0    Q9: Thoughts of better off dead/self-harm past 2 weeks Not at all Not at all Not at all       Patient-reported         3/25/2025     8:58 AM   Last PHQ-9   1.  Little interest or pleasure in doing things 0   2.  Feeling down, depressed, or hopeless 0   3.  Trouble falling or staying asleep, or sleeping too much 0   4.  Feeling tired or having little energy 0   5.  Poor appetite or overeating 0   6.  Feeling bad about yourself 0   7.  Trouble concentrating 0   8.  Moving slowly or restless 0   Q9: Thoughts of better off dead/self-harm past 2 weeks 0   PHQ-9 Total Score 0        Patient-reported         3/25/2025     8:59 AM   DONALD-7    1. Feeling nervous, anxious, or on edge 0   2. Not being able to stop or control worrying 0   3. Worrying too much about different things 0   4. Trouble relaxing 0   5. Being so restless that it is hard to sit still 0   6. Becoming easily annoyed or irritable 0   7. Feeling afraid, as if something awful might happen 0   DONALD-7 Total Score 0        Patient-reported       Advance Care Planning  Patient does not have a Health Care Directive: Discussed advance care planning with patient; however, patient declined at this time.      3/25/2025   General Health   How would you rate your overall physical health? (!) FAIR   Feel stress (tense, anxious, or unable to sleep) To some extent   (!) STRESS CONCERN      3/25/2025   Nutrition   Three or more servings of calcium each day? (!) I DON'T KNOW   Diet: Regular (no restrictions)   How many servings of fruit and vegetables per day? (!) 2-3   How many sweetened beverages each day? 0-1         3/25/2025   Exercise   Days per week of moderate/strenous exercise 1 day   Average minutes spent exercising at this level 20 min   (!) EXERCISE CONCERN      3/25/2025   Social Factors   Frequency of  gathering with friends or relatives Once a week   Worry food won't last until get money to buy more No   Food not last or not have enough money for food? No   Do you have housing? (Housing is defined as stable permanent housing and does not include staying ouside in a car, in a tent, in an abandoned building, in an overnight shelter, or couch-surfing.) Yes   Are you worried about losing your housing? No   Lack of transportation? No   Unable to get utilities (heat,electricity)? No         3/25/2025   Fall Risk   Fallen 2 or more times in the past year? No    Trouble with walking or balance? No        Proxy-reported          3/25/2025   Dental   Dentist two times every year? Yes           2025   TB Screening   Were you born outside of the [place]? No         Today's PHQ-9 Score:       3/25/2025     8:58 AM   PHQ-9 SCORE   PHQ-9 Total Score MyChart 0   PHQ-9 Total Score 0        Patient-reported         3/25/2025   Substance Use   Alcohol more than 3/day or more than 7/wk No   Do you use any other substances recreationally? No     Social History     Tobacco Use    Smoking status: Former     Current packs/day: 0.00     Average packs/day: 0.5 packs/day for 4.0 years (2.0 ttl pk-yrs)     Types: Cigarettes     Start date: 11/15/1987     Quit date: 11/15/1991     Years since quittin.3     Passive exposure: Past    Smokeless tobacco: Never   Vaping Use    Vaping status: Never Used   Substance Use Topics    Alcohol use: Yes     Comment: rare    Drug use: No     Comment: no herbal meds either          3/25/2025   Breast Cancer Screening   Family history of breast, colon, or ovarian cancer? No / Unknown         2025   LAST FHS-7 RESULTS   1st degree relative breast or ovarian cancer No   Mammogram Screening - Mammogram every 1-2 years updated in Health Maintenance based on mutual decision making        3/25/2025   STI Screening   New sexual partner(s) since last STI/HIV test? No     History of abnormal Pap smear: No -  age 30- 64 PAP with HPV every 5 years recommended        Latest Ref Rng & Units 2021     8:04 AM 2021     7:44 AM 10/8/2018     2:35 PM   PAP / HPV   PAP (Historical)   NIL     HPV 16 DNA NEG^Negative Negative   Negative    HPV 18 DNA NEG^Negative Negative   Negative    Other HR HPV NEG^Negative Negative   Negative      ASCVD Risk   The 10-year ASCVD risk score (Paul LINO, et al., 2019) is: 1.5%    Values used to calculate the score:      Age: 54 years      Sex: Female      Is Non- : No      Diabetic: No      Tobacco smoker: No      Systolic Blood Pressure: 122 mmHg      Is BP treated: No      HDL Cholesterol: 54 mg/dL      Total Cholesterol: 145 mg/dL      Pt declines repeat bone density - last one in  was normal.     Reviewed and updated as needed this visit by Provider                    Past Medical History:   Diagnosis Date    Adjustment disorder with depressed mood- mild 2015    Morbid obesity (H)     Postmenopausal - last menses 10/2018     last menses 10/2018 - postmenopausal    Scalp mass      Past Surgical History:   Procedure Laterality Date    COLONOSCOPY N/A 2018    Procedure: COLONOSCOPY;  Surgeon: Arnaldo Mckeon MD;  Location:  GI    COLONOSCOPY  2019    5 polyps - 2 tubular adenomas - repeat in      EXCISE MASS HEAD  3/30/2012    Procedure:EXCISE MASS HEAD; Excision of Scalp Cyst; Surgeon:HAN GONZALEZ; Location:[facility] OR    [facility]  DELIVERY ONLY      , Low Cervical x 2      OB History    Para Term  AB Living   2 2 2 0 0 2   SAB IAB Ectopic Multiple Live Births   0 0 0 0 0      # Outcome Date GA Lbr Quan/2nd Weight Sex Type Anes PTL Lv   2 Term            1 Term              Lab work is in process  Labs reviewed in EPIC  BP Readings from Last 3 Encounters:   25 122/84   10/17/24 128/84   24 110/68    Wt Readings from Last 3 Encounters:   25 132.9 kg (293 lb)   10/17/24 134.7 kg  (297 lb)   22 123.8 kg (273 lb)               Patient Active Problem List   Diagnosis    Morbid obesity due to excess calories (H)    CARDIOVASCULAR SCREENING; LDL GOAL LESS THAN 130    Uterine leiomyoma, unspecified location    Premenstrual dysphoria    Lump of thigh- lipomatous - nothing new     Localized swelling, mass, or lump of upper extremity    DDD (degenerative disc disease), lumbar    DONALD (generalized anxiety disorder)    Dense breast tissue    Family history of diabetes mellitus- father, and multiple family members on paternal side- PGM & PGF     Tubular adenomas of colon - 2 in 2019 - repeat colonoscopy in  per [facility] recommendation     Postmenopausal - last menses 10/2018    Right sided abdominal pain- ? related to constipation vs. high fiber diet - consider magnesium &/or Linzess per [facility]     Postmenopausal symptoms- mild night sweats , mild hot flashes during day - no periods since      Hypernatremia - ? from fasting     Hyperchloremia - ? from fasting     Vaccine refused by patient - flu, shingrix , covid-19 vaccine     Prediabetes - new dx 2024 - a1C = 5.8     Past Surgical History:   Procedure Laterality Date    COLONOSCOPY N/A 2018    Procedure: COLONOSCOPY;  Surgeon: Arnaldo Mckeon MD;  Location: [facility]    COLONOSCOPY  2019    5 polyps - 2 tubular adenomas - repeat in      EXCISE MASS HEAD  3/30/2012    Procedure:EXCISE MASS HEAD; Excision of Scalp Cyst; Surgeon:HAN GONZALEZ; Location:[facility] OR    [facility]C  DELIVERY ONLY      , Low Cervical x 2        Social History     Tobacco Use    Smoking status: Former     Current packs/day: 0.00     Average packs/day: 0.5 packs/day for 4.0 years (2.0 ttl pk-yrs)     Types: Cigarettes     Start date: 11/15/1987     Quit date: 11/15/1991     Years since quittin.3     Passive exposure: Past    Smokeless tobacco: Never   Substance Use Topics    Alcohol use: Yes     Comment: rare     Family History  "  Problem Relation Age of Onset    Psychotic Disorder Mother         unspecified psychotic disorder - at [facility] [place] [facility]     Heart Disease Father         MI 55    Diabetes Father         Type II    Asthma Sister     Heart Disease Maternal Grandmother         CHF    Cerebrovascular Disease Paternal Grandmother     Diabetes Paternal Grandmother         Type II    Diabetes Paternal Grandfather         Type II    Cancer - colorectal Maternal Cousin 42        in her early 40's     Deep Vein Thrombosis Son     Colon Cancer Cousin          when she was 44    Colon Cancer Cousin          at 56    Breast Cancer No family hx of     Ovarian Cancer No family hx of          No current outpatient medications on file.     Allergies   Allergen Reactions    Contrast Dye     Erythromycin Nausea and Vomiting     sensitivity      Recent Labs   Lab Test 10/17/24  1159 24  1010 22  0847 21  0720 19  0905   A1C  --  5.8* 5.6  --  5.2   LDL  --  80 70 54 71   HDL  --  54 56 59 71   TRIG  --  54 64 43 53   ALT  --  20 34 23 23   CR  --  0.72 0.62 0.67 0.66   GFRESTIMATED  --  >90 >90 >90 >90   GFRESTBLACK  --   --   --  >90 >90   POTASSIUM  --  4.5 4.0 3.7 4.0   TSH 2.04 2.35 2.82 2.26 2.63         Review of Systems:   Constitutional, HEENT, cardiovascular, pulmonary, GI, , musculoskeletal, neuro, skin, endocrine and psych systems are negative, except as otherwise noted.     Objective  :   Exam  /84   Pulse 84   Temp 97.1  F (36.2  C) (Tympanic)   Resp 18   Ht 1.765 m (5' 9.5\")   Wt 132.9 kg (293 lb)   LMP 2018   SpO2 96%   BMI 42.65 kg/m     Estimated body mass index is 42.65 kg/m  as calculated from the following:    Height as of this encounter: 1.765 m (5' 9.5\").    Weight as of this encounter: 132.9 kg (293 lb).    Physical Exam:   GENERAL: alert and no distress,morbidly obese.   EYES: Eyes grossly normal to inspection, PERRL and conjunctivae and sclerae " normal  HENT: ear canals and TM's normal, nose and mouth without ulcers or lesions  NECK: no adenopathy, no asymmetry, masses, or scars  RESP: lungs clear to auscultation - no rales, rhonchi or wheezes  BREAST: normal without masses, tenderness or nipple discharge and no palpable axillary masses or adenopathy  CV: regular rate and rhythm, normal S1 S2, no S3 or S4, no murmur, click or rub, no peripheral edema  ABDOMEN: soft, nontender, no hepatosplenomegaly, no masses and bowel sounds normal  MS: no gross musculoskeletal defects noted, no edema  SKIN: no suspicious lesions or rashes  NEURO: Normal strength and tone, mentation intact and speech normal  PSYCH: mentation appears normal, affect normal/bright  Pt declined pelvic exam today.   Physical Exam  - CARDIOVASCULAR: Blood pressure 122/84 mmHg.  - SKIN: 8mm diameter sebaceous cyst noted right superior occipital scalp just right lateral of midline. observed.    Results  - Blood pressure: 122/84  - Pap smear: Last done on February 22, 2021, with no abnormalities  - Colonoscopy: Done on May 20, 2024, found small polyps (2mm, two to 3mm), including two sessile serrated adenomas with no dysplasia  - Bone density: Last done in 2022  - Fasting status: Confirmed fasting for lab tests      Signed Electronically by: Roxann Valdovinos MD

## 2025-03-25 NOTE — PATIENT INSTRUCTIONS
Patient Education     Discussed need for resistance training to help keep bones strong and decrease age -related muscle loss, decreasing insulin resistance and increasing basal metabolic rate to help burn more calories at rest and with exertion.     Look at YouTube for free exercises for basic weight training for biceps, triceps, deltoids, low back, hips, hamstrings, quadriceps, and calf muscles to help strengthen your bones as well as the below exercises.       Recommend calcium 1200mg daily and  vitamin D 1000iu daily in the summer and 2000iu in the fall, winter and spring, and daily exercise with some resistance training to help keep your bones strong. Recent research has shown that daily or every other day weight bearing exercise with resistance training is especially important in maintaining and increasing bone density and preventing osteoporosis.     3 dairy servings/day is recommended to achieve the above if you don't want to supplement.  1 dairy serving = 1-8oz glass of milk, 1-1oz piece of cheese or 1-4oz yogurt.      If you take the above with magnesium 250mg to 400mg daily, you should not get constipation. The magnesium can also help prevent leg cramps and helps the calcium absorb a bit better.     Highly recommend resistance training to help keep bones strong and decrease age -related muscle loss, decreasing insulin resistance and increasing basal metabolic rate to help burn more calories at rest and with exertion.     Look at YouTube for free exercises for basic weight training for biceps, triceps, deltoids, low back, hips, hamstrings, quadriceps, and calf muscles to help strengthen your bones as well as the below exercises.     Also recommend:  a collagen supplement with FORTIGEL , FORTIBONE and /or VERISOL  in it, such as:   Whole Body Collagen by [employer].  It is a little expensive.  About $70 per 13.8oz cannister from [employer].com.  Another is Algae-Brian collagen supplement. = about $59/  "cannister.  You can get both  at some [employer] stores  or on Amazon.com.  They  a finely milled collagen supplement that is unflavored. Some to the others such as Vital Proteins Collagen Peptides have a pretty strong beef/cow or pig flavor to them and may not have  FORTIGEL , FORTIBONE and /or VERISOL  in it.  The evidence on the benefits of whole body collagen on joint health, bone density and overall skin health are still being studied, but the initial research on the Whole Body Collagen and Algae-Brian collagen looks promising.  No significant adverse side effects or drug-to-drug interactions have been found as yet.   Look for one with FORTIGEL , FORTIBONE and /or VERISOL  in it.   I have no financial affiliation with Whole Body Collagen or Algae-Brian.      Recommended also the book Atomic Habits by Arnaldo Aguero for help with life changes.     MyFitnessPal - anni.         For bladder urgency and bladder irritation which can contribute to daytime and/or night-time urgency, accidents or incontinence for children and adults :   Avoid or better yet eliminate citrus juices (orange juice, grapefruit juice, lemonade or limeade), citric acid in things as preservatives,  and /or or vinegars ( acidic substances - even those in salad dressing), all caffeine, even decaf coffee, and teas; alcohol, and artificial sweeteners, red and blue food dyes, sports drinks, spicy foods, chocolate, tomato products, excessive dairy, and carbonated beverages ( even sparkling mcneil).     Even 1 or a partial serving of any of the above can irritate/negatively affect the bladder for up to  3 days.     If you eliminate all the above and are still having problems, please contact our office.   There are medications available to help with this.     Based on our discussion, I have outlined the following instructions for you:    - Focus on managing your weight by paying attention to your nutrition and exercise habits. Consider reading \"Atomic Habits\" " by Arnaldo Aguero for tips on making lifestyle changes.    - Keep an eye on your blood sugar levels as you have prediabetes. Make sure to eat healthy and stay active.    - Plan to have another colonoscopy in 5 years to check on your colon health.    - Schedule an appointment to have the cyst on your head removed.    - Get the Tdap vaccine.    Thank you again for your visit, and we look forward to supporting you in your journey to better health.       Preventive Care Advice   This is general advice given by our system to help you stay healthy. However, your care team may have specific advice just for you. Please talk to your care team about your preventive care needs.  Nutrition  Eat 5 or more servings of fruits and vegetables each day.  Try wheat bread, brown rice and whole grain pasta (instead of white bread, rice, and pasta).  Get enough calcium and vitamin D. Check the label on foods and aim for 100% of the RDA (recommended daily allowance).  Lifestyle  Exercise at least 150 minutes each week  (30 minutes a day, 5 days a week).  Do muscle strengthening activities 2 days a week. These help control your weight and prevent disease.  No smoking.  Wear sunscreen to prevent skin cancer.  Have a dental exam and cleaning every 6 months.  Yearly exams  See your health care team every year to talk about:  Any changes in your health.  Any medicines your care team has prescribed.  Preventive care, family planning, and ways to prevent chronic diseases.  Shots (vaccines)   HPV shots (up to age 26), if you've never had them before.  Hepatitis B shots (up to age 59), if you've never had them before.  COVID-19 shot: Get this shot when it's due.  Flu shot: Get a flu shot every year.  Tetanus shot: Get a tetanus shot every 10 years.  Pneumococcal, hepatitis A, and RSV shots: Ask your care team if you need these based on your risk.  Shingles shot (for age 50 and up)  General health tests  Diabetes screening:  Starting at age 35, Get  screened for diabetes at least every 3 years.  If you are younger than age 35, ask your care team if you should be screened for diabetes.  Cholesterol test: At age 39, start having a cholesterol test every 5 years, or more often if advised.  Bone density scan (DEXA): At age 50, ask your care team if you should have this scan for osteoporosis (brittle bones).  Hepatitis C: Get tested at least once in your life.  STIs (sexually transmitted infections)  Before age 24: Ask your care team if you should be screened for STIs.  After age 24: Get screened for STIs if you're at risk. You are at risk for STIs (including HIV) if:  You are sexually active with more than one person.  You don't use condoms every time.  You or a partner was diagnosed with a sexually transmitted infection.  If you are at risk for HIV, ask about PrEP medicine to prevent HIV.  Get tested for HIV at least once in your life, whether you are at risk for HIV or not.  Cancer screening tests  Cervical cancer screening: If you have a cervix, begin getting regular cervical cancer screening tests starting at age 21.  Breast cancer scan (mammogram): If you've ever had breasts, begin having regular mammograms starting at age 40. This is a scan to check for breast cancer.  Colon cancer screening: It is important to start screening for colon cancer at age 45.  Have a colonoscopy test every 10 years (or more often if you're at risk) Or, ask your provider about stool tests like a FIT test every year or Cologuard test every 3 years.  To learn more about your testing options, visit:   .  For help making a decision, visit:   https://bit.ly/sq52047.  Prostate cancer screening test: If you have a prostate, ask your care team if a prostate cancer screening test (PSA) at age 55 is right for you.  Lung cancer screening: If you are a current or former smoker ages 50 to 80, ask your care team if ongoing lung cancer screenings are right for you.  For informational purposes  "only. Not to replace the advice of your health care provider. Copyright   2023 [facility]. All rights reserved. Clinically reviewed by the [facility]. eASIC 005743 - REV 01/24.  Learning About Being Physically Active  What is physical activity?     Being physically active means doing any kind of activity that gets your body moving.  The types of physical activity that can help you get fit and stay healthy include:  Aerobic or \"cardio\" activities. These make your heart beat faster and make you breathe harder, such as brisk walking, riding a bike, or running. They strengthen your heart and lungs and build up your endurance.  Strength training activities. These make your muscles work against, or \"resist,\" something. Examples include lifting weights or doing push-ups. These activities help tone and strengthen your muscles and bones.  Stretches. These let you move your joints and muscles through their full range of motion. Stretching helps you be more flexible.  Reaching a balance between these three types of physical activity is important because each one contributes to your overall fitness.  What are the benefits of being active?  Being active is one of the best things you can do for your health. It helps you to:  Feel stronger and have more energy to do all the things you like to do.  Focus better at school or work.  Feel, think, and sleep better.  Reach and stay at a healthy weight.  Lose fat and build lean muscle.  Lower your risk for serious health problems, including diabetes, heart attack, high blood pressure, and some cancers.  Keep your heart, lungs, bones, muscles, and joints strong and healthy.  How can you make being active part of your life?  Start slowly. Make it your long-term goal to get at least 30 minutes of exercise on most days of the week. Walking is a good choice. You also may want to do other activities, such as running, swimming, cycling, or playing " "tennis or team sports.  Pick activities that you like--ones that make your heart beat faster, your muscles stronger, and your muscles and joints more flexible. If you find more than one thing you like doing, do them all. You don't have to do the same thing every day.  Get your heart pumping every day. Any activity that makes your heart beat faster and keeps it at that rate for a while counts.  Here are some great ways to get your heart beating faster:  Go for a brisk walk, run, or hike.  Go for a swim or bike ride.  Take an online exercise class or dance.  Play a game of touch football, basketball, or soccer.  Play tennis, pickleball, or racquetball.  Climb stairs.  Even some household chores can be aerobic. Just do them at a faster pace. Raking or mowing the lawn, sweeping the garage, and vacuuming and cleaning your home all can help get your heart rate up.  Strengthen your muscles during the week. You don't have to lift heavy weights or grow big, bulky muscles to get stronger. Doing a few simple activities that make your muscles work against, or \"resist,\" something can help you get stronger. Aim for at least twice a week.  For example, you can:  Do push-ups or sit-ups, which use your own body weight as resistance.  Lift weights or dumbbells or use stretch bands at home or in a gym or community center.  Stretch your muscles often. Stretching will help you as you become more active. It can help you stay flexible and loosen tight muscles. It can also help improve your balance and posture and can be a great way to relax.  Be sure to stretch the muscles you'll be using when you work out. It's best to warm your muscles slightly before you stretch them. Walk or do some other light aerobic activity for a few minutes. Then start stretching.  When you stretch your muscles:  Do it slowly. Stretching is not about going fast or making sudden movements.  Don't push or bounce during a stretch.  Hold each stretch for at least 15 " "to 30 seconds, if you can. You should feel a stretch in the muscle, but not pain.  Breathe out as you do the stretch. Then breathe in as you hold the stretch. Don't hold your breath.  If you're worried about how more activity might affect your health, have a checkup before you start. Follow any special advice your doctor gives you for getting a smart start.  Where can you learn more?  Go to https://www.[employer].net/patiented  Enter W332 in the search box to learn more about \"Learning About Being Physically Active.\"  Current as of: July 31, 2024  Content Version: 14.4    8122-8229 [employer].   Care instructions adapted under license by your healthcare professional. If you have questions about a medical condition or this instruction, always ask your healthcare professional. [employer] disclaims any warranty or liability for your use of this information.    Eating Healthy Foods: Care Instructions  With every meal, you can make healthy food choices. Try to eat a variety of fruits, vegetables, whole grains, lean proteins, and low-fat dairy products. This can help you get the right balance of nutrients, including vitamins and minerals. Small changes add up over time. You can start by adding one healthy food to your meals each day.    Try to make half your plate fruits and vegetables, one-fourth whole grains, and one-fourth lean proteins. Try including dairy with your meals.   Eat more fruits and vegetables. Try to have them with most meals and snacks.   Foods for healthy eating        Fruits   These can be fresh, frozen, canned, or dried.  Try to choose whole fruit rather than fruit juice.  Eat a variety of colors.        Vegetables   These can be fresh, frozen, canned, or dried.  Beans, peas, and lentils count too.        Whole grains   Choose whole-grain breads, cereals, and noodles.  Try brown rice.        Lean proteins   These can include lean meat, poultry, fish, and eggs.  You can also " "have tofu, beans, peas, lentils, nuts, and seeds.        Dairy   Try milk, yogurt, and cheese.  Choose low-fat or fat-free when you can.  If you need to, use lactose-free milk or fortified plant-based milk products, such as soy milk.        Water   Drink water when you're thirsty.  Limit sugar-sweetened drinks, including soda, fruit drinks, and sports drinks.  Where can you learn more?  Go to https://www.[employer].net/patiented  Enter T756 in the search box to learn more about \"Eating Healthy Foods: Care Instructions.\"  Current as of: October 7, 2024  Content Version: 14.4    9841-3119 [employer].   Care instructions adapted under license by your healthcare professional. If you have questions about a medical condition or this instruction, always ask your healthcare professional. [employer] disclaims any warranty or liability for your use of this information.    Learning About Stress  What is stress?     Stress is your body's response to a hard situation. Your body can have a physical, emotional, or mental response. Stress is a fact of life for most people, and it affects everyone differently. What causes stress for you may not be stressful for someone else.  A lot of things can cause stress. You may feel stress when you go on a job interview, take a test, or run a race. This kind of short-term stress is normal and even useful. It can help you if you need to work hard or react quickly. For example, stress can help you finish an important job on time.  Long-term stress is caused by ongoing stressful situations or events. Examples of long-term stress include long-term health problems, ongoing problems at work, or conflicts in your family. Long-term stress can harm your health.  How does stress affect your health?  When you are stressed, your body responds as though you are in danger. It makes hormones that speed up your heart, make you breathe faster, and give you a burst of energy. This is " called the fight-or-flight stress response. If the stress is over quickly, your body goes back to normal and no harm is done.  But if stress happens too often or lasts too long, it can have bad effects. Long-term stress can make you more likely to get sick, and it can make symptoms of some diseases worse. If you tense up when you are stressed, you may develop neck, shoulder, or low back pain. Stress is linked to high blood pressure and heart disease.  Stress also harms your emotional health. It can make you reed, tense, or depressed. Your relationships may suffer, and you may not do well at work or school.  What can you do to manage stress?  You can try these things to help manage stress:   Do something active. Exercise or activity can help reduce stress. Walking is a great way to get started. Even everyday activities such as housecleaning or yard work can help.  Try yoga or brent chi. These techniques combine exercise and meditation. You may need some training at first to learn them.  Do something you enjoy. For example, listen to music or go to a movie. Practice your hobby or do volunteer work.  Meditate. This can help you relax, because you are not worrying about what happened before or what may happen in the future.  Do guided imagery. Imagine yourself in any setting that helps you feel calm. You can use online videos, books, or a teacher to guide you.  Do breathing exercises. For example:  From a standing position, bend forward from the waist with your knees slightly bent. Let your arms dangle close to the floor.  Breathe in slowly and deeply as you return to a standing position. Roll up slowly and lift your head last.  Hold your breath for just a few seconds in the standing position.  Breathe out slowly and bend forward from the waist.  Let your feelings out. Talk, laugh, cry, and express anger when you need to. Talking with supportive friends or family, a counselor, or a linwood leader about your feelings is a  "healthy way to relieve stress. Avoid discussing your feelings with people who make you feel worse.  Write. It may help to write about things that are bothering you. This helps you find out how much stress you feel and what is causing it. When you know this, you can find better ways to cope.  What can you do to prevent stress?  You might try some of these things to help prevent stress:  Manage your time. This helps you find time to do the things you want and need to do.  Get enough sleep. Your body recovers from the stresses of the day while you are sleeping.  Get support. Your family, friends, and community can make a difference in how you experience stress.  Limit your news feed. Avoid or limit time on social media or news that may make you feel stressed.  Do something active. Exercise or activity can help reduce stress. Walking is a great way to get started.  Where can you learn more?  Go to https://www.[employer].net/patiented  Enter N032 in the search box to learn more about \"Learning About Stress.\"  Current as of: October 24, 2024  Content Version: 14.4    9219-2133 [employer].   Care instructions adapted under license by your healthcare professional. If you have questions about a medical condition or this instruction, always ask your healthcare professional. [employer] disclaims any warranty or liability for your use of this information.       "

## 2025-03-26 LAB
ALBUMIN SERPL BCG-MCNC: 4.3 G/DL (ref 3.5–5.2)
ALP SERPL-CCNC: 134 U/L (ref 40–150)
ALT SERPL W P-5'-P-CCNC: 22 U/L (ref 0–50)
ANION GAP SERPL CALCULATED.3IONS-SCNC: 11 MMOL/L (ref 7–15)
AST SERPL W P-5'-P-CCNC: 22 U/L (ref 0–45)
BILIRUB SERPL-MCNC: 0.5 MG/DL
BUN SERPL-MCNC: 11.4 MG/DL (ref 6–20)
CALCIUM SERPL-MCNC: 9.2 MG/DL (ref 8.8–10.4)
CHLORIDE SERPL-SCNC: 108 MMOL/L (ref 98–107)
CHOLEST SERPL-MCNC: 146 MG/DL
CREAT SERPL-MCNC: 0.84 MG/DL (ref 0.51–0.95)
CREAT UR-MCNC: 252 MG/DL
EGFRCR SERPLBLD CKD-EPI 2021: 81 ML/MIN/1.73M2
FASTING STATUS PATIENT QL REPORTED: YES
FASTING STATUS PATIENT QL REPORTED: YES
GLUCOSE SERPL-MCNC: 95 MG/DL (ref 70–99)
HCO3 SERPL-SCNC: 25 MMOL/L (ref 22–29)
HDLC SERPL-MCNC: 51 MG/DL
LDLC SERPL CALC-MCNC: 79 MG/DL
MICROALBUMIN UR-MCNC: 14.8 MG/L
MICROALBUMIN/CREAT UR: 5.87 MG/G CR (ref 0–25)
NONHDLC SERPL-MCNC: 95 MG/DL
POTASSIUM SERPL-SCNC: 4.2 MMOL/L (ref 3.4–5.3)
PROT SERPL-MCNC: 7.3 G/DL (ref 6.4–8.3)
SODIUM SERPL-SCNC: 144 MMOL/L (ref 135–145)
TRIGL SERPL-MCNC: 78 MG/DL
TSH SERPL DL<=0.005 MIU/L-ACNC: 2.28 UIU/ML (ref 0.3–4.2)

## 2025-03-31 ENCOUNTER — OFFICE VISIT (OUTPATIENT)
Dept: FAMILY MEDICINE | Facility: CLINIC | Age: 55
End: 2025-03-31
Payer: COMMERCIAL

## 2025-03-31 VITALS
RESPIRATION RATE: 18 BRPM | TEMPERATURE: 97.6 F | WEIGHT: 293 LBS | SYSTOLIC BLOOD PRESSURE: 116 MMHG | HEART RATE: 104 BPM | BODY MASS INDEX: 41.95 KG/M2 | DIASTOLIC BLOOD PRESSURE: 72 MMHG | OXYGEN SATURATION: 96 % | HEIGHT: 70 IN

## 2025-03-31 DIAGNOSIS — L72.3 SEBACEOUS CYST: Primary | ICD-10-CM

## 2025-03-31 DIAGNOSIS — Z28.20 VACCINE REFUSED BY PATIENT: ICD-10-CM

## 2025-03-31 PROCEDURE — 11420 EXC H-F-NK-SP B9+MARG 0.5/<: CPT | Performed by: FAMILY MEDICINE

## 2025-03-31 PROCEDURE — 3078F DIAST BP <80 MM HG: CPT | Performed by: FAMILY MEDICINE

## 2025-03-31 PROCEDURE — 3074F SYST BP LT 130 MM HG: CPT | Performed by: FAMILY MEDICINE

## 2025-03-31 NOTE — PROGRESS NOTES
"  Assessment & Plan :       ICD-10-CM    1. Sebaceous cyst- with scarring - right medial superior parietal area - removal unsuccessful  L72.3 Adult Dermatology  Referral      2. Vaccine refused by patient - flu, hepB , pneumonia. shingles  Z28.20         Unable to discern tissue planes due to previous scarring and discrete cyst was not able to be found or removed.  Aborted procedure.  Will allow to heal and referred to dermatology.     Sutures out in 7-10 days. Wound care sheet given. See AVS. Will allow to heal and referred to dermatology. Keep wound covered with small amount of clean vaseline till sutures out.     Please, call our clinic or go to the ER immediately if signs or symptoms worsen or fail to improve as anticipated.     No charge for this.     BMI  Estimated body mass index is 42.65 kg/m  as calculated from the following:    Height as of this encounter: 1.765 m (5' 9.5\").    Weight as of this encounter: 132.9 kg (293 lb).   Weight management plan: Discussed healthy diet and exercise guidelines      Follow-up  No follow-ups on file.  Future Appointments 4/22/2025 - 10/19/2025      None                   Subjective   Karyna is a 54 year old, presenting for the following health issues:  Derm Problem (removal)        3/31/2025     9:46 AM   Additional Questions   Roomed by Laney TROY MA   Accompanied by Self     HPI    Sebaceous cyst removal.  Vitals are w/in NL.    After risks of bleeding, infection, scarring, permanent disability, benefits and alternatives were discussed, written  informed consent was obtained for   Sebacecous cyst removal. This was witnessed by Laney De Leon. PHUC.  Pause for the cause was done for the above procedure with Laney De Leon CMA  as well.        Review of Systems  Constitutional, HEENT, cardiovascular, pulmonary, GI, , musculoskeletal, neuro, skin, endocrine and psych systems are negative, except as otherwise noted.      Objective    /72   Pulse 104   Temp 97.6  F (36.4 " " C) (Tympanic)   Resp 18   Ht 1.765 m (5' 9.5\")   Wt 132.9 kg (293 lb)   LMP 08/20/2018   SpO2 96%   BMI 42.65 kg/m    Body mass index is 42.65 kg/m .  Physical Exam   GENERAL: alert and no distress  EYES: Eyes grossly normal to inspection, PERRL and conjunctivae and sclerae normal  NECK: no adenopathy, no asymmetry, masses, or scars  CV: regular rate and rhythm, normal S1 S2, no S3 or S4, no murmur, click or rub, no peripheral edema  NEURO: Normal strength and tone, mentation intact and speech normal  PSYCH: mentation appears normal, affect normal/bright  There appeared to be a sebaceous cyst with previous scarring superficially right medial parietal area of scalp. after prepping area with isopropyl alcohol scrub , 2 ml of lidocaine with epi was used for local anesthesia.  Area further prepped with chlorhexidine soap. Lesion area was incised with a steril 15 blade  and dissection of the area was attempted using sterile technique.  I was Unable to discern tissue planes due to previous scarring and discrete cyst was not able to be found or removed.  Aborted procedure.  3 -4(0) blue prolene sutures were placed to close the wound.   The area was then dressed with bacitracin ointment     Sutures out in 7-10 days. Wound care sheet given. See AVS. Will allow to heal and referred to dermatology.           Signed Electronically by: Roxann Valdovinos MD    "

## 2025-04-03 ENCOUNTER — TELEPHONE (OUTPATIENT)
Dept: DERMATOLOGY | Facility: CLINIC | Age: 55
End: 2025-04-03
Payer: COMMERCIAL

## 2025-04-03 NOTE — TELEPHONE ENCOUNTER
[facility] Call Center    Phone Message    May a detailed message be left on voicemail: yes     Reason for Call: Other: Pt is calling because her PCP was not able to remove her cyst and she would like to be seen as soon as possible to get the sutures removed, please call back thanks!     Action Taken: Other: OX DERM    Travel Screening: Not Applicable     Date of Service:

## 2025-04-04 NOTE — TELEPHONE ENCOUNTER
Called and spoke with pt and scheduled excision. Pt had excision in this area in the past from Dr. Mcclure but the cyst came back.    Dr. Mcclure, do you want to see her for consult before removal since you have done a removal in this area in the past? Right now just scheduled for excision, not consult.    Roseanna HANSON RN  Dermatology   [phone]

## 2025-04-04 NOTE — TELEPHONE ENCOUNTER
Patient Contact    Attempt # 1    Was call answered?  No    Left message on answering machine for patient to call back.    Roseanna HANSON RN  Dermatology   [phone]

## 2025-04-10 ENCOUNTER — ALLIED HEALTH/NURSE VISIT (OUTPATIENT)
Dept: NURSING | Facility: CLINIC | Age: 55
End: 2025-04-10
Payer: COMMERCIAL

## 2025-04-10 DIAGNOSIS — Z48.02 VISIT FOR SUTURE REMOVAL: Primary | ICD-10-CM

## 2025-04-10 NOTE — PROGRESS NOTES
Tona B Goltz presents to the clinic for removal of sutures  Sutures were placed in the clinic on 3/31/25  Patient denies redness, warmth, drainage, bleeding or fevers.  2  sutures are seen and located on the right top of head.   All sutures were easily removed today.   Routine wound care discussed by the RN or provider.   The patient will follow up as needed.    Alia Parks RN on 4/10/2025 at 11:25 AM

## 2025-04-17 ENCOUNTER — OFFICE VISIT (OUTPATIENT)
Dept: URGENT CARE | Facility: URGENT CARE | Age: 55
End: 2025-04-17
Payer: COMMERCIAL

## 2025-04-17 ENCOUNTER — NURSE TRIAGE (OUTPATIENT)
Dept: FAMILY MEDICINE | Facility: CLINIC | Age: 55
End: 2025-04-17
Payer: COMMERCIAL

## 2025-04-17 VITALS
WEIGHT: 293 LBS | OXYGEN SATURATION: 98 % | BODY MASS INDEX: 41.95 KG/M2 | DIASTOLIC BLOOD PRESSURE: 84 MMHG | HEIGHT: 70 IN | TEMPERATURE: 97.9 F | SYSTOLIC BLOOD PRESSURE: 120 MMHG | HEART RATE: 64 BPM | RESPIRATION RATE: 27 BRPM

## 2025-04-17 DIAGNOSIS — R20.2 FACIAL PARESTHESIA: Primary | ICD-10-CM

## 2025-04-17 DIAGNOSIS — J30.2 SEASONAL ALLERGIC RHINITIS, UNSPECIFIED TRIGGER: ICD-10-CM

## 2025-04-17 ASSESSMENT — ENCOUNTER SYMPTOMS
SHORTNESS OF BREATH: 0
HEADACHES: 1
FACIAL SWELLING: 0
SORE THROAT: 1
FEVER: 0
CHEST TIGHTNESS: 0
COLOR CHANGE: 0
RHINORRHEA: 0

## 2025-04-17 NOTE — PROGRESS NOTES
"  Assessment & Plan:        ICD-10-CM    1. Facial paresthesia  R20.2       2. Seasonal allergic rhinitis, unspecified trigger  J30.2             Plan/Clinical Decision Making:    Patient presents with burning, tingling sensation right neck, face and head. No fever or recent illness. No injury or trauma. No rashes or lesions. Has current seasonal allergies. Normal exam.  Unsure of exact etiology of symptoms.   Recommend continuing ibuprofen to help with pain.   Start antihistamine OTC to help with seasonal allergies.   Monitor symptoms.       Return if symptoms worsen or fail to improve, for in 5-7 days.     At the end of the encounter, I discussed results, diagnosis, medications. Discussed red flags for immediate return to clinic/ER, as well as indications for follow up if no improvement. Patient understood and agreed to plan. Patient was stable for discharge.        Catarina Collins PA-C on 4/17/2025 at 6:22 PM          Subjective:     HPI:    Karyna is a 54 year old female who presents to clinic today for the following health issues:  Chief Complaint   Patient presents with    Urgent Care     Patient feels pain around her head and tingling sensation since yesterday morning,      HPI    Pain and sensitivity of above right eyebrow, right temple. right cheek, right neck.   No rash. No swelling.   Started yesterday morning.   Irritation of right eye.   Has seasonal allergies. Not on any medication.   Took ibuprofen that helped with pain.    Nurse triage note from today:  S-(situation): Numbness/tingling/burning sensation of R side of face, neck, and head.     B-(background): Gradual onset starting yesterday in ear and worsening since. No Hx of shingles or cardiovascular disease.     A-(assessment): Intermittent HA's, \"not that bad\", located in the same area as the burning/numbness/tingling area of the head and temple area of the head. Feels burning/numbness/tingling in R side of face, head, and neck. Last took Advil " 400 mg at approximately 12-1 pm. Denies any weakness of extremities, chest pain, or any other symptoms.     Review of Systems   Constitutional:  Negative for fever.   HENT:  Positive for congestion, ear pain (mild) and sore throat (irritation). Negative for dental problem, facial swelling, mouth sores and rhinorrhea.    Eyes:  Negative for visual disturbance.   Respiratory:  Negative for chest tightness and shortness of breath.    Skin:  Negative for color change and rash.   Neurological:  Positive for headaches.         Patient Active Problem List   Diagnosis    Morbid obesity due to excess calories (H)    CARDIOVASCULAR SCREENING; LDL GOAL LESS THAN 130    Uterine leiomyoma, unspecified location    Premenstrual dysphoria    Lump of thigh- lipomatous - nothing new     Localized swelling, mass, or lump of upper extremity    DDD (degenerative disc disease), lumbar    DONALD (generalized anxiety disorder)    Dense breast tissue    Family history of diabetes mellitus- father, and multiple family members on paternal side- PGM & PGF     Tubular adenomas of colon - 2 in 6/2019 - repeat colonoscopy in 2024 per [facility] recommendation     Postmenopausal - last menses 10/2018    Right sided abdominal pain- ? related to constipation vs. high fiber diet - consider magnesium &/or Linzess per [facility]     Postmenopausal symptoms- mild night sweats , mild hot flashes during day - no periods since 2018     Hypernatremia - ? from fasting     Hyperchloremia - ? from fasting     Vaccine refused by patient - flu, shingrix , covid-19 vaccine     Prediabetes - new dx 1/12/2024 - a1C = 5.8    Class 3 drug-induced obesity with serious comorbidity and body mass index (BMI) of 40.0 to 44.9 in adult (H)        Past Medical History:   Diagnosis Date    Adjustment disorder with depressed mood- mild 04/27/2015    Morbid obesity (H)     Postmenopausal - last menses 10/2018     last menses 10/2018 - postmenopausal    Scalp mass        Social History  "    Tobacco Use    Smoking status: Former     Current packs/day: 0.00     Average packs/day: 0.5 packs/day for 4.0 years (2.0 ttl pk-yrs)     Types: Cigarettes     Start date: 11/15/1987     Quit date: 11/15/1991     Years since quittin.4     Passive exposure: Past    Smokeless tobacco: Never   Substance Use Topics    Alcohol use: Yes     Comment: rare             Objective:     Vitals:    25 1800   BP: 120/84   Pulse: 64   Resp: 27   Temp: 97.9  F (36.6  C)   TempSrc: Tympanic   SpO2: 98%   Weight: 133.3 kg (293 lb 12.8 oz)   Height: 1.778 m (5' 10\")         Physical Exam   EXAM:   Pleasant, alert, appropriate appearance. NAD.  Head Exam: Normocephalic, atraumatic.  Eye Exam:  PERRLA, EOMI, non icteric/injection.  No orbital tenderness or swelling. Normal lids.   Ear Exam: TMs grey without bulging. Normal canals.  Normal pinna.  Nose Exam: Normal external nose.  Congested. No sinus tenderness.   OroPharynx Exam:  Moist mucous membranes. No erythema, pharynx without exudate or hypertrophy.  Neck/Thyroid Exam:  No LAD.  No nodules or enlargement.  Chest/Respiratory Exam: CTAB.  Neuro: CN II-XII intact grossly intact. Sensation intact.   Skin: no rash or lesion.      Results:  No results found for any visits on 25.      "

## 2025-04-17 NOTE — PROGRESS NOTES
Urgent Care Clinic Visit    Chief Complaint   Patient presents with    Urgent Care     Patient feels pain around her head and tingling sensation since yesterday morning,                4/17/2025     6:05 PM   Additional Questions   Roomed by santiago   Accompanied by self

## 2025-04-17 NOTE — TELEPHONE ENCOUNTER
"S-(situation): Numbness/tingling/burning sensation of R side of face, neck, and head.    B-(background): Gradual onset starting yesterday in ear and worsening since. No Hx of shingles or cardiovascular disease.    A-(assessment): Intermittent HA's, \"not that bad\", located in the same area as the burning/numbness/tingling area of the head and temple area of the head. Feels burning/numbness/tingling in R side of face, head, and neck. Last took Advil 400 mg at approximately 12-1 pm. Denies any weakness of extremities, chest pain, or any other symptoms.    R-(recommendations): Advised to be seen in UC for further workup. Advised to call back with any new or worsening symptoms prior to UC visit. Expressed understanding and acceptance of the plan. Had no further questions at this time.  Advised can call back to clinic at any time with concerns.     Palma SALVADOR, RN, PHN    Reason for Disposition   Neurologic deficit of gradual onset (e.g., days to weeks), ANY of the following: * Weakness of the face, arm, or leg on one side of the body* Numbness of the face, arm, or leg on one side of the body* Loss of speech or garbled speech    Additional Information   Negative: SEVERE weakness (e.g., unable to walk or barely able to walk, requires support) and new-onset or getting worse   Negative: Difficult to awaken or acting confused (e.g., disoriented, slurred speech)   Negative: Sudden onset of weakness of the face, arm or leg on one side of the body and present now (Exception: Bell's palsy suspected: weakness on one side of the face developing over hours to days, with no other symptoms.)   Negative: Sudden onset of numbness of the face, arm or leg on one side of the body and present now   Negative: Sudden onset of loss of speech or garbled speech and present now   Negative: Sounds like a life-threatening emergency to the triager   Negative: Confusion, disorientation, or hallucinations is main symptom   Negative: Dizziness is " "main symptom   Negative: Followed a head injury within last 3 days   Negative: Headache (with neurologic deficit)   Negative: Unable to urinate (or only a few drops) and bladder feels very full   Negative: Loss of bladder or bowel control (urine or bowel incontinence; wetting self, leaking stool) of new-onset   Negative: Back pain with numbness (loss of sensation) in groin or rectal area   Negative: Neurologic deficit that was brief (now gone), ANY of the following: * Weakness of the face, arm, or leg on one side of the body * Numbness of the face, arm, or leg on one side of the body * Loss of speech or garbled speech   Negative: Patient sounds very sick or weak to the triager    Answer Assessment - Initial Assessment Questions  1. SYMPTOM: \"What is the main symptom you are concerned about?\" (e.g., weakness, numbness)      Numbness/tingling/burning sensation on R side of face, neck, and head  2. ONSET: \"When did this start?\" (minutes, hours, days; while sleeping)      Yesterday  3. LAST NORMAL: \"When was the last time you (the patient) were normal (no symptoms)?\"      2 days ago  4. PATTERN \"Does this come and go, or has it been constant since it started?\"  \"Is it present now?\"      Intermittent  5. CARDIAC SYMPTOMS: \"Have you had any of the following symptoms: chest pain, difficulty breathing, palpitations?\"      No  6. NEUROLOGIC SYMPTOMS: \"Have you had any of the following symptoms: headache, dizziness, vision loss, double vision, changes in speech, unsteady on your feet?\"      No  7. OTHER SYMPTOMS: \"Do you have any other symptoms?\"      No  8. PREGNANCY: \"Is there any chance you are pregnant?\" \"When was your last menstrual period?\"      No    Protocols used: Neurologic Deficit-A-[place]    "

## 2025-04-19 ENCOUNTER — OFFICE VISIT (OUTPATIENT)
Dept: URGENT CARE | Facility: URGENT CARE | Age: 55
End: 2025-04-19
Payer: COMMERCIAL

## 2025-04-19 VITALS
WEIGHT: 293 LBS | OXYGEN SATURATION: 97 % | DIASTOLIC BLOOD PRESSURE: 82 MMHG | TEMPERATURE: 97.8 F | BODY MASS INDEX: 41.95 KG/M2 | HEART RATE: 57 BPM | RESPIRATION RATE: 16 BRPM | HEIGHT: 70 IN | SYSTOLIC BLOOD PRESSURE: 118 MMHG

## 2025-04-19 DIAGNOSIS — B02.9 HERPES ZOSTER WITHOUT COMPLICATION: Primary | ICD-10-CM

## 2025-04-19 PROCEDURE — 3079F DIAST BP 80-89 MM HG: CPT | Performed by: FAMILY MEDICINE

## 2025-04-19 PROCEDURE — 99213 OFFICE O/P EST LOW 20 MIN: CPT | Performed by: FAMILY MEDICINE

## 2025-04-19 PROCEDURE — 3074F SYST BP LT 130 MM HG: CPT | Performed by: FAMILY MEDICINE

## 2025-04-19 RX ORDER — VALACYCLOVIR HYDROCHLORIDE 1 G/1
1000 TABLET, FILM COATED ORAL 3 TIMES DAILY
Qty: 21 TABLET | Refills: 0 | Status: SHIPPED | OUTPATIENT
Start: 2025-04-19 | End: 2025-04-26

## 2025-04-19 RX ORDER — HYDROCODONE BITARTRATE AND ACETAMINOPHEN 5; 325 MG/1; MG/1
1 TABLET ORAL EVERY 6 HOURS PRN
Qty: 10 TABLET | Refills: 0 | Status: SHIPPED | OUTPATIENT
Start: 2025-04-19

## 2025-04-19 NOTE — PATIENT INSTRUCTIONS
Okay to take ibuprofen 200 mg - 4 tablets (800 mg) every 8 hours as needed.  Okay to take tylenol 500 mg - 2 tablets (1000 mg) every 6-8 hours as needed, do not exceed 3000 mg in 24 hours.  Take full course of Valtrex to treat shingles    Use norco sparingly for worse pain    Follow up with eye clinic for eye check due to location of shingles on face

## 2025-04-19 NOTE — PROGRESS NOTES
Urgent Care Clinic Visit    Chief Complaint   Patient presents with    Urgent Care     Seen a few days ago, head pain, tingling on right side- didn't have a rash, can now see red spots on face, right eye is swollen, was triaged x 1 day ago.                4/19/2025    11:10 AM   Additional Questions   Roomed by Wanda HEIN

## 2025-04-19 NOTE — PROGRESS NOTES
"SUBJECTIVE:  Chief Complaint   Patient presents with    Urgent Care     Seen a few days ago, head pain, tingling on right side- didn't have a rash, can now see red spots on face, right eye is swollen, was triaged x 1 day ago.      Lucinda B Goltz is a 54 year old female who presents with a chief complaint of head pain, tingling, rash.    Reviewed triage nurse call yesterday :  Situation: new eye pain.   Background: seen yesterday in  for right sided numbness in face, neck, and head.   Assessment: eye is swollen, right eye, eye is red, and top eyelid is swollen, in the corner towards nose.  Little crusty drainage this AM, think it was white. No other drainage today. Little bit of pain in eyelid. No vision changes.      Tingling above eye on the scalp, and above right eyebrow.   The skin is sensitive.      Reviewed  Urgent Care visit - facial paresthesia, did not have rash at that time.    Whole face tingling, rash started today.  Had eye swelling.  No vision changes.  Endorsed eye is a little more pink.    Had chicken pox when young    Past Medical History:   Diagnosis Date    Adjustment disorder with depressed mood- mild 2015    Morbid obesity (H)     Postmenopausal - last menses 10/2018     last menses 10/2018 - postmenopausal    Scalp mass      No current outpatient medications on file.     Social History     Tobacco Use    Smoking status: Former     Current packs/day: 0.00     Average packs/day: 0.5 packs/day for 4.0 years (2.0 ttl pk-yrs)     Types: Cigarettes     Start date: 11/15/1987     Quit date: 11/15/1991     Years since quittin.4     Passive exposure: Past    Smokeless tobacco: Never   Substance Use Topics    Alcohol use: Yes     Comment: rare       ROS:  Review of systems negative except as stated above.    EXAM:   /82   Pulse 57   Temp 97.8  F (36.6  C)   Resp 16   Ht 1.778 m (5' 10\")   Wt 132.9 kg (293 lb)   LMP 2018   SpO2 97%   BMI 42.04 kg/m    GENERAL " APPEARANCE: healthy, alert and no distress  EYES: EOM intact, PERRL, right conjunctiva with faint pink  EARS: right ear canal and TM with rash/blister  SKIN: right side of face -scattered redden maculopapules, some clustering on forehead, cheek, nose - does not cross midline  PSYCH:alert, affect bright      ASSESSMENT/PLAN:  (B02.9) Herpes zoster without complication  (primary encounter diagnosis)  Comment: right face  Plan: valACYclovir (VALTREX) 1000 mg tablet,         HYDROcodone-acetaminophen (NORCO) 5-325 MG         tablet            Reassurance given, reviewed that symptom presentation is concerning for shingles - RX Valtrex given for treatment.  Discussed that due to concern for possible eye involvement that will need to be seen by eye clinic.  Small quantity of norco 5/325 mg #10 given to use sparingly for pain, okay for tylenol and ibuprofen for discomfort.  Avoid individual that does not know their chickenpox status or has not obtain varicella vaccination    Follow up with eye clinic for eye check urgently  Follow up with primary provider if no improvement of symptoms in 1 week    Ross Le MD  April 19, 2025 12:42 PM

## 2025-04-24 ENCOUNTER — TELEPHONE (OUTPATIENT)
Dept: FAMILY MEDICINE | Facility: CLINIC | Age: 55
End: 2025-04-24
Payer: COMMERCIAL

## 2025-04-25 ENCOUNTER — TELEPHONE (OUTPATIENT)
Dept: FAMILY MEDICINE | Facility: CLINIC | Age: 55
End: 2025-04-25
Payer: COMMERCIAL

## 2025-04-25 NOTE — TELEPHONE ENCOUNTER
Forms/Letter Request    Type of form/letter: Disability      Is Release of Information needed?: No    Do we have the form/letter: Yes:     Who is the form from? Insurance comp    Where did/will the form come from? form was faxed in    When is form/letter needed by: asap    How would you like the form/letter returned:   Patient to pu    Patient Notified form requests are processed in 5-7 business days:No    Could we send this information to you in [facility] or would you prefer to receive a phone call?:   Patient would prefer a phone call   Okay to leave a detailed message?: Yes at Cell number on file:    Telephone Information:   Mobile [phone]     Amber ARREDONDO

## 2025-04-30 NOTE — TELEPHONE ENCOUNTER
Called patient patient stated the paperwork is already done by Margaret Alonzo. [facility]

## 2025-04-30 NOTE — TELEPHONE ENCOUNTER
NILE paperwork.  Needs video or in person with RADHA Watson MBA, MS, PA-C  [facility]

## (undated) DEVICE — KIT ENDO TURNOVER/PROCEDURE W/CLEAN A SCOPE LINERS 103888

## (undated) RX ORDER — FENTANYL CITRATE 50 UG/ML
INJECTION, SOLUTION INTRAMUSCULAR; INTRAVENOUS
Status: DISPENSED
Start: 2018-11-02